# Patient Record
Sex: FEMALE | Race: WHITE | NOT HISPANIC OR LATINO
[De-identification: names, ages, dates, MRNs, and addresses within clinical notes are randomized per-mention and may not be internally consistent; named-entity substitution may affect disease eponyms.]

---

## 2017-02-21 ENCOUNTER — FORM ENCOUNTER (OUTPATIENT)
Age: 62
End: 2017-02-21

## 2017-02-22 ENCOUNTER — APPOINTMENT (OUTPATIENT)
Dept: CT IMAGING | Facility: IMAGING CENTER | Age: 62
End: 2017-02-22

## 2017-02-22 ENCOUNTER — OUTPATIENT (OUTPATIENT)
Dept: OUTPATIENT SERVICES | Facility: HOSPITAL | Age: 62
LOS: 1 days | End: 2017-02-22
Payer: MEDICARE

## 2017-02-22 DIAGNOSIS — C80.1 MALIGNANT (PRIMARY) NEOPLASM, UNSPECIFIED: Chronic | ICD-10-CM

## 2017-02-22 DIAGNOSIS — Z98.89 OTHER SPECIFIED POSTPROCEDURAL STATES: Chronic | ICD-10-CM

## 2017-02-22 DIAGNOSIS — Z00.8 ENCOUNTER FOR OTHER GENERAL EXAMINATION: ICD-10-CM

## 2017-02-22 PROCEDURE — 82565 ASSAY OF CREATININE: CPT

## 2017-02-22 PROCEDURE — 74177 CT ABD & PELVIS W/CONTRAST: CPT

## 2017-02-22 PROCEDURE — 71260 CT THORAX DX C+: CPT

## 2017-03-07 ENCOUNTER — APPOINTMENT (OUTPATIENT)
Dept: ORTHOPEDIC SURGERY | Facility: CLINIC | Age: 62
End: 2017-03-07

## 2017-03-09 ENCOUNTER — RESULT REVIEW (OUTPATIENT)
Age: 62
End: 2017-03-09

## 2017-03-10 ENCOUNTER — APPOINTMENT (OUTPATIENT)
Dept: ULTRASOUND IMAGING | Facility: IMAGING CENTER | Age: 62
End: 2017-03-10

## 2017-03-10 ENCOUNTER — OUTPATIENT (OUTPATIENT)
Dept: OUTPATIENT SERVICES | Facility: HOSPITAL | Age: 62
LOS: 1 days | End: 2017-03-10
Payer: MEDICARE

## 2017-03-10 DIAGNOSIS — Z98.89 OTHER SPECIFIED POSTPROCEDURAL STATES: Chronic | ICD-10-CM

## 2017-03-10 DIAGNOSIS — Z00.8 ENCOUNTER FOR OTHER GENERAL EXAMINATION: ICD-10-CM

## 2017-03-10 DIAGNOSIS — C80.1 MALIGNANT (PRIMARY) NEOPLASM, UNSPECIFIED: Chronic | ICD-10-CM

## 2017-03-10 PROCEDURE — 88305 TISSUE EXAM BY PATHOLOGIST: CPT

## 2017-03-10 PROCEDURE — 38505 NEEDLE BIOPSY LYMPH NODES: CPT

## 2017-03-10 PROCEDURE — 88172 CYTP DX EVAL FNA 1ST EA SITE: CPT

## 2017-03-10 PROCEDURE — 76942 ECHO GUIDE FOR BIOPSY: CPT

## 2017-03-10 PROCEDURE — 88173 CYTOPATH EVAL FNA REPORT: CPT

## 2017-03-15 LAB — NON-GYNECOLOGICAL CYTOLOGY STUDY: SIGNIFICANT CHANGE UP

## 2017-03-28 ENCOUNTER — FORM ENCOUNTER (OUTPATIENT)
Age: 62
End: 2017-03-28

## 2017-03-29 ENCOUNTER — OUTPATIENT (OUTPATIENT)
Dept: OUTPATIENT SERVICES | Facility: HOSPITAL | Age: 62
LOS: 1 days | End: 2017-03-29
Payer: MEDICARE

## 2017-03-29 ENCOUNTER — APPOINTMENT (OUTPATIENT)
Dept: NEUROSURGERY | Facility: CLINIC | Age: 62
End: 2017-03-29

## 2017-03-29 ENCOUNTER — APPOINTMENT (OUTPATIENT)
Dept: MRI IMAGING | Facility: IMAGING CENTER | Age: 62
End: 2017-03-29

## 2017-03-29 VITALS
SYSTOLIC BLOOD PRESSURE: 92 MMHG | HEIGHT: 66 IN | RESPIRATION RATE: 16 BRPM | DIASTOLIC BLOOD PRESSURE: 48 MMHG | OXYGEN SATURATION: 92 % | HEART RATE: 72 BPM | WEIGHT: 108 LBS | BODY MASS INDEX: 17.36 KG/M2

## 2017-03-29 DIAGNOSIS — C80.1 MALIGNANT (PRIMARY) NEOPLASM, UNSPECIFIED: Chronic | ICD-10-CM

## 2017-03-29 DIAGNOSIS — Z98.89 OTHER SPECIFIED POSTPROCEDURAL STATES: Chronic | ICD-10-CM

## 2017-03-29 DIAGNOSIS — Z98.890 OTHER SPECIFIED POSTPROCEDURAL STATES: ICD-10-CM

## 2017-03-29 DIAGNOSIS — R22.31 LOCALIZED SWELLING, MASS AND LUMP, RIGHT UPPER LIMB: ICD-10-CM

## 2017-03-29 DIAGNOSIS — C79.31 SECONDARY MALIGNANT NEOPLASM OF BRAIN: ICD-10-CM

## 2017-03-29 PROCEDURE — 82565 ASSAY OF CREATININE: CPT

## 2017-03-29 PROCEDURE — 70553 MRI BRAIN STEM W/O & W/DYE: CPT

## 2017-03-29 PROCEDURE — A9585: CPT

## 2017-03-29 RX ORDER — GEFITINIB 250 MG/1
250 TABLET, COATED ORAL DAILY
Refills: 0 | Status: ACTIVE | COMMUNITY
Start: 2017-03-29

## 2017-04-28 ENCOUNTER — APPOINTMENT (OUTPATIENT)
Dept: CT IMAGING | Facility: CLINIC | Age: 62
End: 2017-04-28

## 2017-04-28 ENCOUNTER — OUTPATIENT (OUTPATIENT)
Dept: OUTPATIENT SERVICES | Facility: HOSPITAL | Age: 62
LOS: 1 days | End: 2017-04-28
Payer: MEDICARE

## 2017-04-28 DIAGNOSIS — Z98.89 OTHER SPECIFIED POSTPROCEDURAL STATES: Chronic | ICD-10-CM

## 2017-04-28 DIAGNOSIS — Z00.8 ENCOUNTER FOR OTHER GENERAL EXAMINATION: ICD-10-CM

## 2017-04-28 DIAGNOSIS — C80.1 MALIGNANT (PRIMARY) NEOPLASM, UNSPECIFIED: Chronic | ICD-10-CM

## 2017-04-28 PROCEDURE — 71250 CT THORAX DX C-: CPT

## 2017-05-11 ENCOUNTER — INPATIENT (INPATIENT)
Facility: HOSPITAL | Age: 62
LOS: 2 days | Discharge: HOME | DRG: 542 | End: 2017-05-14
Attending: GENERAL ACUTE CARE HOSPITAL | Admitting: GENERAL ACUTE CARE HOSPITAL
Payer: MEDICARE

## 2017-05-11 VITALS
SYSTOLIC BLOOD PRESSURE: 143 MMHG | RESPIRATION RATE: 16 BRPM | HEART RATE: 73 BPM | TEMPERATURE: 99 F | DIASTOLIC BLOOD PRESSURE: 84 MMHG | OXYGEN SATURATION: 98 %

## 2017-05-11 DIAGNOSIS — Z98.89 OTHER SPECIFIED POSTPROCEDURAL STATES: Chronic | ICD-10-CM

## 2017-05-11 DIAGNOSIS — R47.81 SLURRED SPEECH: ICD-10-CM

## 2017-05-11 DIAGNOSIS — C80.1 MALIGNANT (PRIMARY) NEOPLASM, UNSPECIFIED: Chronic | ICD-10-CM

## 2017-05-11 LAB
ALBUMIN SERPL ELPH-MCNC: 4.4 G/DL — SIGNIFICANT CHANGE UP (ref 3.3–5)
ALP SERPL-CCNC: 105 U/L — SIGNIFICANT CHANGE UP (ref 40–120)
ALT FLD-CCNC: 70 U/L RC — HIGH (ref 10–45)
APPEARANCE UR: CLEAR — SIGNIFICANT CHANGE UP
AST SERPL-CCNC: 148 U/L — HIGH (ref 10–40)
BILIRUB SERPL-MCNC: 0.3 MG/DL — SIGNIFICANT CHANGE UP (ref 0.2–1.2)
BILIRUB UR-MCNC: NEGATIVE — SIGNIFICANT CHANGE UP
BUN SERPL-MCNC: 7 MG/DL — SIGNIFICANT CHANGE UP (ref 7–23)
CALCIUM SERPL-MCNC: 9.6 MG/DL — SIGNIFICANT CHANGE UP (ref 8.4–10.5)
CHLORIDE SERPL-SCNC: 96 MMOL/L — SIGNIFICANT CHANGE UP (ref 96–108)
CO2 SERPL-SCNC: 25 MMOL/L — SIGNIFICANT CHANGE UP (ref 22–31)
COLOR SPEC: SIGNIFICANT CHANGE UP
CREAT SERPL-MCNC: 0.88 MG/DL — SIGNIFICANT CHANGE UP (ref 0.5–1.3)
DIFF PNL FLD: NEGATIVE — SIGNIFICANT CHANGE UP
GLUCOSE SERPL-MCNC: 163 MG/DL — HIGH (ref 70–99)
GLUCOSE UR QL: NEGATIVE — SIGNIFICANT CHANGE UP
HCT VFR BLD CALC: 32.8 % — LOW (ref 34.5–45)
HGB BLD-MCNC: 11.6 G/DL — SIGNIFICANT CHANGE UP (ref 11.5–15.5)
KETONES UR-MCNC: NEGATIVE — SIGNIFICANT CHANGE UP
LEUKOCYTE ESTERASE UR-ACNC: NEGATIVE — SIGNIFICANT CHANGE UP
MCHC RBC-ENTMCNC: 35.2 GM/DL — SIGNIFICANT CHANGE UP (ref 32–36)
MCHC RBC-ENTMCNC: 35.8 PG — HIGH (ref 27–34)
MCV RBC AUTO: 102 FL — HIGH (ref 80–100)
NITRITE UR-MCNC: NEGATIVE — SIGNIFICANT CHANGE UP
PH UR: 7 — SIGNIFICANT CHANGE UP (ref 5–8)
PLATELET # BLD AUTO: 437 K/UL — HIGH (ref 150–400)
POTASSIUM SERPL-MCNC: 4 MMOL/L — SIGNIFICANT CHANGE UP (ref 3.5–5.3)
POTASSIUM SERPL-SCNC: 4 MMOL/L — SIGNIFICANT CHANGE UP (ref 3.5–5.3)
PROT SERPL-MCNC: 7.9 G/DL — SIGNIFICANT CHANGE UP (ref 6–8.3)
PROT UR-MCNC: NEGATIVE — SIGNIFICANT CHANGE UP
RBC # BLD: 3.23 M/UL — LOW (ref 3.8–5.2)
RBC # FLD: 15.5 % — HIGH (ref 10.3–14.5)
SODIUM SERPL-SCNC: 137 MMOL/L — SIGNIFICANT CHANGE UP (ref 135–145)
SP GR SPEC: 1.01 — LOW (ref 1.01–1.02)
UROBILINOGEN FLD QL: NEGATIVE — SIGNIFICANT CHANGE UP
WBC # BLD: 5.4 K/UL — SIGNIFICANT CHANGE UP (ref 3.8–10.5)
WBC # FLD AUTO: 5.4 K/UL — SIGNIFICANT CHANGE UP (ref 3.8–10.5)

## 2017-05-11 PROCEDURE — 70450 CT HEAD/BRAIN W/O DYE: CPT | Mod: 26

## 2017-05-11 PROCEDURE — 73090 X-RAY EXAM OF FOREARM: CPT | Mod: 26,LT

## 2017-05-11 PROCEDURE — 93010 ELECTROCARDIOGRAM REPORT: CPT

## 2017-05-11 PROCEDURE — 71010: CPT | Mod: 26

## 2017-05-11 PROCEDURE — 99285 EMERGENCY DEPT VISIT HI MDM: CPT | Mod: 25,GC

## 2017-05-11 RX ORDER — SODIUM CHLORIDE 9 MG/ML
1000 INJECTION INTRAMUSCULAR; INTRAVENOUS; SUBCUTANEOUS
Qty: 0 | Refills: 0 | Status: DISCONTINUED | OUTPATIENT
Start: 2017-05-11 | End: 2017-05-14

## 2017-05-11 RX ORDER — DEXAMETHASONE 0.5 MG/5ML
10 ELIXIR ORAL ONCE
Qty: 0 | Refills: 0 | Status: DISCONTINUED | OUTPATIENT
Start: 2017-05-11 | End: 2017-05-11

## 2017-05-11 RX ORDER — LEVETIRACETAM 250 MG/1
1000 TABLET, FILM COATED ORAL ONCE
Qty: 0 | Refills: 0 | Status: COMPLETED | OUTPATIENT
Start: 2017-05-11 | End: 2017-05-12

## 2017-05-11 RX ORDER — DEXAMETHASONE 0.5 MG/5ML
10 ELIXIR ORAL ONCE
Qty: 0 | Refills: 0 | Status: COMPLETED | OUTPATIENT
Start: 2017-05-11 | End: 2017-05-12

## 2017-05-11 RX ADMIN — SODIUM CHLORIDE 100 MILLILITER(S): 9 INJECTION INTRAMUSCULAR; INTRAVENOUS; SUBCUTANEOUS at 20:54

## 2017-05-11 NOTE — ED ADULT NURSE NOTE - PSH
Cancer  Tjfwu-u-nkud insertion  H/O brain surgery  7/9/15 - removal of brain tumor--post op received gamma knife followed by RT  History of lung biopsy  left   2014 EBUS  positive lung cancer  History of lung surgery  left wedge 1/15 --- diagnosed with mets to brain and right wrist

## 2017-05-11 NOTE — ED ADULT NURSE NOTE - OBJECTIVE STATEMENT
61 y/o female presented to the ED c/o facial swelling, slurred speech x 4 days. Pt denies difficulty swallowing, or breathing.   Pt was at chemo and was concerned about facial swelling. Pt reports swelling began 4 days ago. Pt also reports slightly slurred speech, and sent in for eval. Pt has hx of brain surgery for a tumor. Pt arrived directly from chemotherapy today, [facility] already accessed and dated 5/11/2017. Pt is A&Ox3, PERRL, lungs CTA, abd. soft, NT, ND, skin W/D/I, MAEx4. 63 y/o female presented to the ED c/o facial swelling, slurred speech x 4 days. Pt denies difficulty swallowing, or breathing.   Pt was at chemo and was concerned about facial swelling. Pt reports swelling began 4 days ago. Pt also reports slightly slurred speech, and sent in for eval. Pt has hx of metastatic lung cancer w/ METS to brain. Pt arrived directly from chemotherapy today, [facility] already accessed and dated 5/11/2017. Pt is A&Ox3, PERRL, lungs CTA, abd. soft, NT, ND, skin W/D/I, MAEx4.

## 2017-05-11 NOTE — ED ADULT NURSE NOTE - PMH
Anxiety    Depression    History of seizure  occurred after brain surgery -- last seizure July 2015  Hyperlipidemia    Hypertension    Hyponatremia    Hypothyroidism    Lung cancer  diagnosed in 2014 --  mets to bone, brain s/p craniotomy for brain met resection, lung wedge resection and right forearm met resection. also s/p xrt  Lung cancer, left  biopsy  chemo cisplatin/Alimta 11/2014  radiation 2014  Mass  right wrist mass in November 2016  Seizure  last seizure in 2015 after brain surgery  Snoring  TATIANA precautions -- responds affirmatively to STOP BANG questionnaire -- admits to loud snoring; age > 50; h/o htn

## 2017-05-11 NOTE — ED PROVIDER NOTE - OBJECTIVE STATEMENT
Private Physician Frandy Mendiola (ONC), Kassie Powers (PCP)  62y female pmh lung ca dx 6/2014, SP Surg, lung, to lung, crainotomy, On chemo, Last dose today. Pt comes to ed complains of Slurred speech onset 1 week ago. Had facial swelling last week that responded to benadryl. No fever chills. No change in gait. No ha, mild nausea/vomiting after chemo. Sent to pmd for eval.

## 2017-05-11 NOTE — ED PROVIDER NOTE - MEDICAL DECISION MAKING DETAILS
Hx of metastatic Lung ca pw slight slurred speech for past week, Ro met check ct head consult onc/neuro surg check labs.

## 2017-05-11 NOTE — ED PROVIDER NOTE - PSH
Cancer  Yjyyd-n-lnse insertion  H/O brain surgery  7/9/15 - removal of brain tumor--post op received gamma knife followed by RT  History of lung biopsy  left   2014 EBUS  positive lung cancer  History of lung surgery  left wedge 1/15 --- diagnosed with mets to brain and right wrist

## 2017-05-12 DIAGNOSIS — Z29.9 ENCOUNTER FOR PROPHYLACTIC MEASURES, UNSPECIFIED: ICD-10-CM

## 2017-05-12 DIAGNOSIS — F32.9 MAJOR DEPRESSIVE DISORDER, SINGLE EPISODE, UNSPECIFIED: ICD-10-CM

## 2017-05-12 DIAGNOSIS — R47.81 SLURRED SPEECH: ICD-10-CM

## 2017-05-12 DIAGNOSIS — C34.90 MALIGNANT NEOPLASM OF UNSPECIFIED PART OF UNSPECIFIED BRONCHUS OR LUNG: ICD-10-CM

## 2017-05-12 LAB
ALBUMIN SERPL ELPH-MCNC: 4.2 G/DL — SIGNIFICANT CHANGE UP (ref 3.3–5)
ALP SERPL-CCNC: 96 U/L — SIGNIFICANT CHANGE UP (ref 40–120)
ALT FLD-CCNC: 64 U/L — HIGH (ref 10–45)
ANION GAP SERPL CALC-SCNC: 16 MMOL/L — SIGNIFICANT CHANGE UP (ref 5–17)
AST SERPL-CCNC: 132 U/L — HIGH (ref 10–40)
BASOPHILS # BLD AUTO: 0.01 K/UL — SIGNIFICANT CHANGE UP (ref 0–0.2)
BASOPHILS NFR BLD AUTO: 0.2 % — SIGNIFICANT CHANGE UP (ref 0–2)
BILIRUB SERPL-MCNC: 0.3 MG/DL — SIGNIFICANT CHANGE UP (ref 0.2–1.2)
BUN SERPL-MCNC: 8 MG/DL — SIGNIFICANT CHANGE UP (ref 7–23)
CALCIUM SERPL-MCNC: 9 MG/DL — SIGNIFICANT CHANGE UP (ref 8.4–10.5)
CHLORIDE SERPL-SCNC: 94 MMOL/L — LOW (ref 96–108)
CO2 SERPL-SCNC: 23 MMOL/L — SIGNIFICANT CHANGE UP (ref 22–31)
CREAT SERPL-MCNC: 0.8 MG/DL — SIGNIFICANT CHANGE UP (ref 0.5–1.3)
EOSINOPHIL # BLD AUTO: 0 K/UL — SIGNIFICANT CHANGE UP (ref 0–0.5)
EOSINOPHIL NFR BLD AUTO: 0 % — SIGNIFICANT CHANGE UP (ref 0–6)
GLUCOSE SERPL-MCNC: 146 MG/DL — HIGH (ref 70–99)
HCT VFR BLD CALC: 31 % — LOW (ref 34.5–45)
HGB BLD-MCNC: 10.8 G/DL — LOW (ref 11.5–15.5)
IMM GRANULOCYTES NFR BLD AUTO: 0.2 % — SIGNIFICANT CHANGE UP (ref 0–1.5)
LYMPHOCYTES # BLD AUTO: 0.54 K/UL — LOW (ref 1–3.3)
LYMPHOCYTES # BLD AUTO: 10.3 % — LOW (ref 13–44)
MAGNESIUM SERPL-MCNC: 2 MG/DL — SIGNIFICANT CHANGE UP (ref 1.6–2.6)
MCHC RBC-ENTMCNC: 34.2 PG — HIGH (ref 27–34)
MCHC RBC-ENTMCNC: 34.8 GM/DL — SIGNIFICANT CHANGE UP (ref 32–36)
MCV RBC AUTO: 98.1 FL — SIGNIFICANT CHANGE UP (ref 80–100)
MONOCYTES # BLD AUTO: 0.03 K/UL — SIGNIFICANT CHANGE UP (ref 0–0.9)
MONOCYTES NFR BLD AUTO: 0.6 % — LOW (ref 2–14)
NEUTROPHILS # BLD AUTO: 4.63 K/UL — SIGNIFICANT CHANGE UP (ref 1.8–7.4)
NEUTROPHILS NFR BLD AUTO: 88.7 % — HIGH (ref 43–77)
PHOSPHATE SERPL-MCNC: 2.5 MG/DL — SIGNIFICANT CHANGE UP (ref 2.5–4.5)
PLATELET # BLD AUTO: 418 K/UL — HIGH (ref 150–400)
POTASSIUM SERPL-MCNC: 4.1 MMOL/L — SIGNIFICANT CHANGE UP (ref 3.5–5.3)
POTASSIUM SERPL-SCNC: 4.1 MMOL/L — SIGNIFICANT CHANGE UP (ref 3.5–5.3)
PROT SERPL-MCNC: 7.5 G/DL — SIGNIFICANT CHANGE UP (ref 6–8.3)
RBC # BLD: 3.16 M/UL — LOW (ref 3.8–5.2)
RBC # FLD: 17.5 % — HIGH (ref 10.3–14.5)
SODIUM SERPL-SCNC: 133 MMOL/L — LOW (ref 135–145)
T4 AB SER-ACNC: <0.4 UG/DL — SIGNIFICANT CHANGE UP (ref 4.6–12)
TSH SERPL-MCNC: 98.77 UIU/ML — HIGH (ref 0.27–4.2)
WBC # BLD: 5.22 K/UL — SIGNIFICANT CHANGE UP (ref 3.8–10.5)
WBC # FLD AUTO: 5.22 K/UL — SIGNIFICANT CHANGE UP (ref 3.8–10.5)

## 2017-05-12 PROCEDURE — 76700 US EXAM ABDOM COMPLETE: CPT | Mod: 26

## 2017-05-12 PROCEDURE — 99223 1ST HOSP IP/OBS HIGH 75: CPT

## 2017-05-12 PROCEDURE — 99497 ADVNCD CARE PLAN 30 MIN: CPT | Mod: 25

## 2017-05-12 PROCEDURE — 70540 MRI ORBIT/FACE/NECK W/O DYE: CPT | Mod: 26

## 2017-05-12 PROCEDURE — 70553 MRI BRAIN STEM W/O & W/DYE: CPT | Mod: 26

## 2017-05-12 RX ORDER — PROCHLORPERAZINE MALEATE 5 MG
10 TABLET ORAL EVERY 12 HOURS
Qty: 0 | Refills: 0 | Status: DISCONTINUED | OUTPATIENT
Start: 2017-05-12 | End: 2017-05-14

## 2017-05-12 RX ORDER — LEVETIRACETAM 250 MG/1
500 TABLET, FILM COATED ORAL
Qty: 0 | Refills: 0 | Status: DISCONTINUED | OUTPATIENT
Start: 2017-05-12 | End: 2017-05-14

## 2017-05-12 RX ORDER — PANTOPRAZOLE SODIUM 20 MG/1
40 TABLET, DELAYED RELEASE ORAL
Qty: 0 | Refills: 0 | Status: DISCONTINUED | OUTPATIENT
Start: 2017-05-12 | End: 2017-05-14

## 2017-05-12 RX ORDER — DEXAMETHASONE 0.5 MG/5ML
4 ELIXIR ORAL EVERY 6 HOURS
Qty: 0 | Refills: 0 | Status: DISCONTINUED | OUTPATIENT
Start: 2017-05-12 | End: 2017-05-14

## 2017-05-12 RX ORDER — ESCITALOPRAM OXALATE 10 MG/1
10 TABLET, FILM COATED ORAL DAILY
Qty: 0 | Refills: 0 | Status: DISCONTINUED | OUTPATIENT
Start: 2017-05-12 | End: 2017-05-14

## 2017-05-12 RX ADMIN — PANTOPRAZOLE SODIUM 40 MILLIGRAM(S): 20 TABLET, DELAYED RELEASE ORAL at 06:46

## 2017-05-12 RX ADMIN — SODIUM CHLORIDE 100 MILLILITER(S): 9 INJECTION INTRAMUSCULAR; INTRAVENOUS; SUBCUTANEOUS at 21:54

## 2017-05-12 RX ADMIN — ESCITALOPRAM OXALATE 10 MILLIGRAM(S): 10 TABLET, FILM COATED ORAL at 13:04

## 2017-05-12 RX ADMIN — Medication 102 MILLIGRAM(S): at 01:29

## 2017-05-12 RX ADMIN — LEVETIRACETAM 500 MILLIGRAM(S): 250 TABLET, FILM COATED ORAL at 21:53

## 2017-05-12 RX ADMIN — Medication 4 MILLIGRAM(S): at 17:19

## 2017-05-12 RX ADMIN — LEVETIRACETAM 400 MILLIGRAM(S): 250 TABLET, FILM COATED ORAL at 02:23

## 2017-05-12 RX ADMIN — LEVETIRACETAM 500 MILLIGRAM(S): 250 TABLET, FILM COATED ORAL at 10:21

## 2017-05-12 RX ADMIN — Medication 4 MILLIGRAM(S): at 06:46

## 2017-05-12 RX ADMIN — Medication 4 MILLIGRAM(S): at 23:44

## 2017-05-12 RX ADMIN — Medication 4 MILLIGRAM(S): at 13:05

## 2017-05-12 NOTE — H&P ADULT. - RADIOLOGY RESULTS AND INTERPRETATION
CXR reviewed: no focal consolidation  CT head reviewed: s/p left parietal craniotomy; there is low density in the parietal surgical bed appears to be slightly more prominent on prior exam; May represent residual neoplasm and edema  XR right forearm reviewed: s/p fixation plate and screws; no pathological fractures.

## 2017-05-12 NOTE — H&P ADULT. - FAMILY HISTORY
<<-----Click on this checkbox to enter Family History Family history of heart disease, Mom     Father  Still living? Unknown  Family history of bone cancer, Age at diagnosis: Age Unknown

## 2017-05-12 NOTE — H&P ADULT. - LAB RESULTS AND INTERPRETATION
labs personally reviewed and interpreted:  no leukocytosis, Hb 11.6, platelet 437  electrolytes normal  , ALT 70, alk phos 105  urinalysis negative

## 2017-05-12 NOTE — H&P ADULT. - PROBLEM SELECTOR PLAN 2
lung cancer mets to brain and axillary lymph node; ?new submandibular/submental lymph node  currently on chemo therapy  oncology f/u in AM  will get MRI neck for lymphadenopathy  Also has swelling of right wrist, ?new lesion  XR wrist was negative for pathological fracture

## 2017-05-12 NOTE — H&P ADULT. - PROBLEM SELECTOR PLAN 3
? metastasis to liver  will check US liver for further evaluation  viral hepatitis panel was negative in July 2016  monitor LFTs

## 2017-05-12 NOTE — H&P ADULT. - PROBLEM SELECTOR PLAN 5
no AC at this time due to brain lesion; no AC at this time due to brain lesion;  protonix I had 20 minute discussion with patient regarding goals of care.  Patient verbalized understanding.   is the health care proxy.  At this point patient remains full code.

## 2017-05-12 NOTE — H&P ADULT. - PSH
Cancer  Xhabt-k-tdmy insertion  H/O brain surgery  7/9/15 - removal of brain tumor--post op received gamma knife followed by RT  History of lung biopsy  left   2014 EBUS  positive lung cancer  History of lung surgery  left wedge 1/15 --- diagnosed with mets to brain and right wrist

## 2017-05-12 NOTE — H&P ADULT. - OTHER
Old records reviewed:  CT chest w contrast and CT abd/pelv February 2017: increased in size of right axillary and paraesophageal lymph nodes.   MRI head 3/29/17: stable enhancement in left parietal craniotomy site with stable small associated encephalocele  viral hepatitis panel negative July 2016  EKG July 2015: NSR  CXR July 2016: clear

## 2017-05-12 NOTE — H&P ADULT. - HISTORY OF PRESENT ILLNESS
61 yo female with PMH of depression, anxiety, hypothyroidism, seizures, lung cancer with metastasis to brain and wrist, s/p lung surgery, craniotomy, presents here with slurred speech.  As per , patient has been having slurring of the speech intermittently for last one week.  She sounds like she has trouble moving her tongue.  About 1.5 week ago, she also had swelling of face and eyes for which she took benadryl.  Swelling seems to be improving but still persistent under the chin.  She denies any pain, SOB, cough, fever.  She does have generalized weakness, but denies any trouble walking.  She denies any numbness or tingling.  She was at [place] for last few days and returned yesterday.  Patient went to her oncologist today for chemotherapy and her oncologist noticed that patient was having some difficulty with speaking and therefore referred patient here for further evaluation.  In ED, patient had a CT brain done which showed a low density lesion suspicious for residual neoplasm and edema.  Neurosurgery recommended keppra and decadron and MRI of brain. 63 yo female with PMH of depression, anxiety, hypothyroidism, seizures, lung cancer with metastasis to brain and wrist, s/p lung surgery, craniotomy, presents here with slurred speech.  As per , patient has been having slurring of the speech intermittently for last one week.  She sounds like she has trouble moving her tongue.  About 2 weeks ago patient was diagnosed with pneumonia and was treated with amoxicillin.  After she completed a 7 day course, about 1 week ago, she also had swelling of face and eyes for which she took benadryl.  Swelling seems to be improving but still persistent under the chin.  She denies any pain, SOB, cough, fever.  She does have generalized weakness, but denies any trouble walking.  She denies any numbness or tingling.  She was at [facility] for last few days and returned yesterday.  Patient went to her oncologist today for chemotherapy and her oncologist noticed that patient was having some difficulty with speaking and therefore referred patient here for further evaluation.  In ED, patient had a CT brain done which showed a low density lesion suspicious for residual neoplasm and edema.  Neurosurgery recommended keppra and decadron and MRI of brain.

## 2017-05-12 NOTE — H&P ADULT. - PROBLEM SELECTOR PLAN 1
Patient with intermittent slurred speech, unclear if related to brain lesions  CT brain showed possible residual neoplasm and/or edema  loaded with keppra 1000mg IV, s/p decadron 10mg IV  neurosurgery saw patient recommends getting MRI brain, which is ordered  consider neurology consult in AM  c/w neuro checks q4h Patient with intermittent slurred speech, unclear if related to brain lesions  CT brain showed possible residual neoplasm and/or edema  loaded with keppra 1000mg IV, s/p decadron 10mg IV one dose; will continue with decadron 4mg q6h and keppra 500mg q12 for now  neurosurgery saw patient recommends getting MRI brain, which is ordered  consider neurology consult in AM to also address keppra dose  c/w neuro checks q4h

## 2017-05-13 LAB
ANION GAP SERPL CALC-SCNC: 16 MMOL/L — SIGNIFICANT CHANGE UP (ref 5–17)
BUN SERPL-MCNC: 8 MG/DL — SIGNIFICANT CHANGE UP (ref 7–23)
CALCIUM SERPL-MCNC: 9.3 MG/DL — SIGNIFICANT CHANGE UP (ref 8.4–10.5)
CHLORIDE SERPL-SCNC: 96 MMOL/L — SIGNIFICANT CHANGE UP (ref 96–108)
CO2 SERPL-SCNC: 22 MMOL/L — SIGNIFICANT CHANGE UP (ref 22–31)
CREAT SERPL-MCNC: 0.73 MG/DL — SIGNIFICANT CHANGE UP (ref 0.5–1.3)
GLUCOSE SERPL-MCNC: 107 MG/DL — HIGH (ref 70–99)
HCT VFR BLD CALC: 30.6 % — LOW (ref 34.5–45)
HGB BLD-MCNC: 10.2 G/DL — LOW (ref 11.5–15.5)
MCHC RBC-ENTMCNC: 33.3 GM/DL — SIGNIFICANT CHANGE UP (ref 32–36)
MCHC RBC-ENTMCNC: 33.3 PG — SIGNIFICANT CHANGE UP (ref 27–34)
MCV RBC AUTO: 100 FL — SIGNIFICANT CHANGE UP (ref 80–100)
PLATELET # BLD AUTO: 410 K/UL — HIGH (ref 150–400)
POTASSIUM SERPL-MCNC: 4 MMOL/L — SIGNIFICANT CHANGE UP (ref 3.5–5.3)
POTASSIUM SERPL-SCNC: 4 MMOL/L — SIGNIFICANT CHANGE UP (ref 3.5–5.3)
RBC # BLD: 3.06 M/UL — LOW (ref 3.8–5.2)
RBC # FLD: 17.9 % — HIGH (ref 10.3–14.5)
SODIUM SERPL-SCNC: 134 MMOL/L — LOW (ref 135–145)
WBC # BLD: 9.19 K/UL — SIGNIFICANT CHANGE UP (ref 3.8–10.5)
WBC # FLD AUTO: 9.19 K/UL — SIGNIFICANT CHANGE UP (ref 3.8–10.5)

## 2017-05-13 PROCEDURE — 99232 SBSQ HOSP IP/OBS MODERATE 35: CPT

## 2017-05-13 RX ORDER — ASPIRIN/CALCIUM CARB/MAGNESIUM 324 MG
81 TABLET ORAL DAILY
Qty: 0 | Refills: 0 | Status: DISCONTINUED | OUTPATIENT
Start: 2017-05-13 | End: 2017-05-14

## 2017-05-13 RX ORDER — DOCUSATE SODIUM 100 MG
100 CAPSULE ORAL
Qty: 0 | Refills: 0 | Status: DISCONTINUED | OUTPATIENT
Start: 2017-05-13 | End: 2017-05-14

## 2017-05-13 RX ADMIN — LEVETIRACETAM 500 MILLIGRAM(S): 250 TABLET, FILM COATED ORAL at 17:04

## 2017-05-13 RX ADMIN — ESCITALOPRAM OXALATE 10 MILLIGRAM(S): 10 TABLET, FILM COATED ORAL at 11:30

## 2017-05-13 RX ADMIN — SODIUM CHLORIDE 100 MILLILITER(S): 9 INJECTION INTRAMUSCULAR; INTRAVENOUS; SUBCUTANEOUS at 15:30

## 2017-05-13 RX ADMIN — Medication 4 MILLIGRAM(S): at 05:12

## 2017-05-13 RX ADMIN — PANTOPRAZOLE SODIUM 40 MILLIGRAM(S): 20 TABLET, DELAYED RELEASE ORAL at 05:12

## 2017-05-13 RX ADMIN — LEVETIRACETAM 500 MILLIGRAM(S): 250 TABLET, FILM COATED ORAL at 05:12

## 2017-05-13 RX ADMIN — Medication 4 MILLIGRAM(S): at 11:30

## 2017-05-13 RX ADMIN — Medication 4 MILLIGRAM(S): at 17:04

## 2017-05-14 ENCOUNTER — TRANSCRIPTION ENCOUNTER (OUTPATIENT)
Age: 62
End: 2017-05-14

## 2017-05-14 VITALS
OXYGEN SATURATION: 99 % | RESPIRATION RATE: 18 BRPM | HEART RATE: 70 BPM | SYSTOLIC BLOOD PRESSURE: 156 MMHG | TEMPERATURE: 98 F | DIASTOLIC BLOOD PRESSURE: 89 MMHG

## 2017-05-14 LAB
ANION GAP SERPL CALC-SCNC: 17 MMOL/L — SIGNIFICANT CHANGE UP (ref 5–17)
BUN SERPL-MCNC: 11 MG/DL — SIGNIFICANT CHANGE UP (ref 7–23)
CALCIUM SERPL-MCNC: 9.7 MG/DL — SIGNIFICANT CHANGE UP (ref 8.4–10.5)
CHLORIDE SERPL-SCNC: 95 MMOL/L — LOW (ref 96–108)
CO2 SERPL-SCNC: 23 MMOL/L — SIGNIFICANT CHANGE UP (ref 22–31)
CREAT SERPL-MCNC: 0.74 MG/DL — SIGNIFICANT CHANGE UP (ref 0.5–1.3)
GLUCOSE SERPL-MCNC: 97 MG/DL — SIGNIFICANT CHANGE UP (ref 70–99)
HCT VFR BLD CALC: 33.5 % — LOW (ref 34.5–45)
HGB BLD-MCNC: 11.1 G/DL — LOW (ref 11.5–15.5)
MCHC RBC-ENTMCNC: 33.1 GM/DL — SIGNIFICANT CHANGE UP (ref 32–36)
MCHC RBC-ENTMCNC: 33.1 PG — SIGNIFICANT CHANGE UP (ref 27–34)
MCV RBC AUTO: 100 FL — SIGNIFICANT CHANGE UP (ref 80–100)
PLATELET # BLD AUTO: 407 K/UL — HIGH (ref 150–400)
POTASSIUM SERPL-MCNC: 4 MMOL/L — SIGNIFICANT CHANGE UP (ref 3.5–5.3)
POTASSIUM SERPL-SCNC: 4 MMOL/L — SIGNIFICANT CHANGE UP (ref 3.5–5.3)
RBC # BLD: 3.35 M/UL — LOW (ref 3.8–5.2)
RBC # FLD: 18.1 % — HIGH (ref 10.3–14.5)
SODIUM SERPL-SCNC: 135 MMOL/L — SIGNIFICANT CHANGE UP (ref 135–145)
WBC # BLD: 7.65 K/UL — SIGNIFICANT CHANGE UP (ref 3.8–10.5)
WBC # FLD AUTO: 7.65 K/UL — SIGNIFICANT CHANGE UP (ref 3.8–10.5)

## 2017-05-14 PROCEDURE — 99232 SBSQ HOSP IP/OBS MODERATE 35: CPT

## 2017-05-14 PROCEDURE — 99285 EMERGENCY DEPT VISIT HI MDM: CPT | Mod: 25

## 2017-05-14 PROCEDURE — 85027 COMPLETE CBC AUTOMATED: CPT

## 2017-05-14 PROCEDURE — 93005 ELECTROCARDIOGRAM TRACING: CPT

## 2017-05-14 PROCEDURE — 70540 MRI ORBIT/FACE/NECK W/O DYE: CPT

## 2017-05-14 PROCEDURE — A9585: CPT

## 2017-05-14 PROCEDURE — 80053 COMPREHEN METABOLIC PANEL: CPT

## 2017-05-14 PROCEDURE — 76700 US EXAM ABDOM COMPLETE: CPT

## 2017-05-14 PROCEDURE — 73090 X-RAY EXAM OF FOREARM: CPT

## 2017-05-14 PROCEDURE — 86255 FLUORESCENT ANTIBODY SCREEN: CPT

## 2017-05-14 PROCEDURE — 86341 ISLET CELL ANTIBODY: CPT

## 2017-05-14 PROCEDURE — 70553 MRI BRAIN STEM W/O & W/DYE: CPT

## 2017-05-14 PROCEDURE — 83735 ASSAY OF MAGNESIUM: CPT

## 2017-05-14 PROCEDURE — 84443 ASSAY THYROID STIM HORMONE: CPT

## 2017-05-14 PROCEDURE — 81003 URINALYSIS AUTO W/O SCOPE: CPT

## 2017-05-14 PROCEDURE — 71045 X-RAY EXAM CHEST 1 VIEW: CPT

## 2017-05-14 PROCEDURE — 70450 CT HEAD/BRAIN W/O DYE: CPT

## 2017-05-14 PROCEDURE — 84100 ASSAY OF PHOSPHORUS: CPT

## 2017-05-14 PROCEDURE — 84436 ASSAY OF TOTAL THYROXINE: CPT

## 2017-05-14 PROCEDURE — 80048 BASIC METABOLIC PNL TOTAL CA: CPT

## 2017-05-14 RX ORDER — DOCUSATE SODIUM 100 MG
1 CAPSULE ORAL
Qty: 0 | Refills: 0 | COMMUNITY
Start: 2017-05-14

## 2017-05-14 RX ORDER — PANTOPRAZOLE SODIUM 20 MG/1
1 TABLET, DELAYED RELEASE ORAL
Qty: 30 | Refills: 0 | OUTPATIENT
Start: 2017-05-14 | End: 2017-06-13

## 2017-05-14 RX ORDER — ASPIRIN/CALCIUM CARB/MAGNESIUM 324 MG
1 TABLET ORAL
Qty: 0 | Refills: 0 | COMMUNITY
Start: 2017-05-14

## 2017-05-14 RX ORDER — LEVETIRACETAM 250 MG/1
1 TABLET, FILM COATED ORAL
Qty: 60 | Refills: 0 | OUTPATIENT
Start: 2017-05-14 | End: 2017-06-13

## 2017-05-14 RX ORDER — DEXAMETHASONE 0.5 MG/5ML
1 ELIXIR ORAL
Qty: 10 | Refills: 0 | OUTPATIENT
Start: 2017-05-14 | End: 2017-05-18

## 2017-05-14 RX ADMIN — Medication 100 MILLIGRAM(S): at 05:16

## 2017-05-14 RX ADMIN — ESCITALOPRAM OXALATE 10 MILLIGRAM(S): 10 TABLET, FILM COATED ORAL at 11:46

## 2017-05-14 RX ADMIN — SODIUM CHLORIDE 100 MILLILITER(S): 9 INJECTION INTRAMUSCULAR; INTRAVENOUS; SUBCUTANEOUS at 00:18

## 2017-05-14 RX ADMIN — Medication 100 MILLIGRAM(S): at 00:11

## 2017-05-14 RX ADMIN — Medication 81 MILLIGRAM(S): at 11:45

## 2017-05-14 RX ADMIN — Medication 4 MILLIGRAM(S): at 05:15

## 2017-05-14 RX ADMIN — LEVETIRACETAM 500 MILLIGRAM(S): 250 TABLET, FILM COATED ORAL at 05:15

## 2017-05-14 RX ADMIN — PANTOPRAZOLE SODIUM 40 MILLIGRAM(S): 20 TABLET, DELAYED RELEASE ORAL at 05:15

## 2017-05-14 RX ADMIN — Medication 4 MILLIGRAM(S): at 11:44

## 2017-05-14 RX ADMIN — Medication 4 MILLIGRAM(S): at 00:19

## 2017-05-14 NOTE — DISCHARGE NOTE ADULT - CARE PROVIDER_API CALL
Live Vidal), Neurology  [address]  [place] [address]  Phone: [phone]  Fax: [phone]    Frandy Mendiola), Hematology; Medical Oncology  [address]  [place] [address]  Phone: [phone]  Fax: [phone]

## 2017-05-14 NOTE — DISCHARGE NOTE ADULT - CARE PLAN
Principal Discharge DX:	Slurred speech  Goal:	now resolved  Instructions for follow-up, activity and diet:	symptoms likely related to mets to the brain and edema, continue to take medications as prescribed   follow up with Neurology in 1 -2 weeks   Repeat MRI in 1 month  Secondary Diagnosis:	Malignant neoplasm of lung, unspecified laterality, unspecified part of lung  Instructions for follow-up, activity and diet:	f/u with Oncology  Secondary Diagnosis:	Seizure  Instructions for follow-up, activity and diet:	continue with medications  follow up with neurology  Secondary Diagnosis:	Depression, unspecified depression type  Instructions for follow-up, activity and diet:	SEEK IMMEDIATE CARE IF  You have thoughts about hurting yourself or others.  You lose touch with reality (have psychotic symptoms).  You are taking medicine for depression and have a serious side effect

## 2017-05-14 NOTE — DISCHARGE NOTE ADULT - MEDICATION SUMMARY - MEDICATIONS TO TAKE
I will START or STAY ON the medications listed below when I get home from the hospital:    Boost supplement one can every afternoon  --     -- Indication: For Supplement     dexamethasone 4 mg oral tablet  -- 1 tab(s) po every 8 hours x 2 days, then 1 tab po  every 12 hrs for 2 days   -- Indication: For Brain edema     Tylenol 500 mg oral tablet  -- 2 tab(s) by mouth , As Needed  -- Indication: For Pain    aspirin 81 mg oral delayed release tablet  -- 1 tab(s) by mouth once a day  -- Indication: For Prophylactic measure    levETIRAcetam 500 mg oral tablet  -- 1 tab(s) by mouth 2 times a day  -- Indication: For antiseizure     Lexapro 10 mg oral tablet  -- 1 tab(s) by mouth once a day (at bedtime)  -- Indication: For Depression    Compazine  -- 10 milligram(s) by mouth every 12 hours, As Needed  -- Indication: For nausea     docusate sodium 100 mg oral capsule  -- 1 cap(s) by mouth 2 times a day  -- Indication: For constipation    pantoprazole 40 mg oral delayed release tablet  -- 1 tab(s) by mouth once a day (before a meal)  -- Indication: For reflux

## 2017-05-14 NOTE — DISCHARGE NOTE ADULT - PLAN OF CARE
now resolved symptoms likely related to mets to the brain and edema, continue to take medications as prescribed   follow up with Neurology in 1 -2 weeks   Repeat MRI in 1 month f/u with Oncology continue with medications  follow up with neurology SEEK IMMEDIATE CARE IF  You have thoughts about hurting yourself or others.  You lose touch with reality (have psychotic symptoms).  You are taking medicine for depression and have a serious side effect

## 2017-05-14 NOTE — DISCHARGE NOTE ADULT - MEDICATION SUMMARY - MEDICATIONS TO CHANGE
I will SWITCH the dose or number of times a day I take the medications listed below when I get home from the hospital:    Keppra 250 mg oral tablet  -- 1 tab(s) by mouth 2 times a day

## 2017-05-14 NOTE — DISCHARGE NOTE ADULT - HOSPITAL COURSE
to be completed by attending 61 yo female with PMH of depression, anxiety, hypothyroidism, seizures, lung cancer with metastasis to brain and wrist, s/p lung surgery, craniotomy, presents here with slurred speech.  Pt admitted to medicine. was followed by neuro/ Nsx , onco and cardio.  Sx resolved... MRI : No new intracranial signal or enhancement abnormality.  sx were thought to possibly be sec to chemo though not highly likely. another possiblity was leptomeningeal disease however pt already on checmo.   pt was hemodynamically stable and was cleared for DC by neuro.  decardon taper given for possible small elemnet of inflmatory changes that were not seen on MRI .

## 2017-05-14 NOTE — DISCHARGE NOTE ADULT - MEDICATION SUMMARY - MEDICATIONS TO STOP TAKING
I will STOP taking the medications listed below when I get home from the hospital:    Mobic 7.5 mg oral tablet  -- 1 tab(s) by mouth , As Needed--last dose last week    acetaminophen-oxyCODONE 325 mg-5 mg oral tablet  -- 1 tab(s) by mouth every 4 hours MDD:6 prn pain  -- Caution federal law prohibits the transfer of this drug to any person other  than the person for whom it was prescribed.  May cause drowsiness.  Alcohol may intensify this effect.  Use care when operating dangerous machinery.  This prescription cannot be refilled.  This product contains acetaminophen.  Do not use  with any other product containing acetaminophen to prevent possible liver damage.  Using more of this medication than prescribed may cause serious breathing problems.

## 2017-05-14 NOTE — DISCHARGE NOTE ADULT - PATIENT PORTAL LINK FT
“You can access the FollowHealth Patient Portal, offered by [facility], by registering with the following website: [email or web address]”

## 2017-05-14 NOTE — DISCHARGE NOTE ADULT - SECONDARY DIAGNOSIS.
Malignant neoplasm of lung, unspecified laterality, unspecified part of lung Seizure Depression, unspecified depression type

## 2017-05-16 ENCOUNTER — APPOINTMENT (OUTPATIENT)
Dept: ORTHOPEDIC SURGERY | Facility: CLINIC | Age: 62
End: 2017-05-16

## 2017-05-24 ENCOUNTER — EMERGENCY (EMERGENCY)
Facility: HOSPITAL | Age: 62
LOS: 1 days | Discharge: HOME | End: 2017-05-24
Attending: EMERGENCY MEDICINE | Admitting: EMERGENCY MEDICINE
Payer: MEDICARE

## 2017-05-24 VITALS
HEART RATE: 71 BPM | RESPIRATION RATE: 20 BRPM | OXYGEN SATURATION: 100 % | TEMPERATURE: 98 F | DIASTOLIC BLOOD PRESSURE: 77 MMHG | SYSTOLIC BLOOD PRESSURE: 115 MMHG

## 2017-05-24 DIAGNOSIS — R25.1 TREMOR, UNSPECIFIED: ICD-10-CM

## 2017-05-24 DIAGNOSIS — E78.5 HYPERLIPIDEMIA, UNSPECIFIED: ICD-10-CM

## 2017-05-24 DIAGNOSIS — Z98.89 OTHER SPECIFIED POSTPROCEDURAL STATES: Chronic | ICD-10-CM

## 2017-05-24 DIAGNOSIS — C80.1 MALIGNANT (PRIMARY) NEOPLASM, UNSPECIFIED: Chronic | ICD-10-CM

## 2017-05-24 DIAGNOSIS — R53.1 WEAKNESS: ICD-10-CM

## 2017-05-24 DIAGNOSIS — Z79.82 LONG TERM (CURRENT) USE OF ASPIRIN: ICD-10-CM

## 2017-05-24 DIAGNOSIS — Z88.0 ALLERGY STATUS TO PENICILLIN: ICD-10-CM

## 2017-05-24 DIAGNOSIS — Z98.890 OTHER SPECIFIED POSTPROCEDURAL STATES: ICD-10-CM

## 2017-05-24 DIAGNOSIS — I10 ESSENTIAL (PRIMARY) HYPERTENSION: ICD-10-CM

## 2017-05-24 DIAGNOSIS — E03.9 HYPOTHYROIDISM, UNSPECIFIED: ICD-10-CM

## 2017-05-24 DIAGNOSIS — Z79.899 OTHER LONG TERM (CURRENT) DRUG THERAPY: ICD-10-CM

## 2017-05-24 LAB
ALBUMIN SERPL ELPH-MCNC: 4.2 G/DL — SIGNIFICANT CHANGE UP (ref 3.3–5)
ALP SERPL-CCNC: 88 U/L — SIGNIFICANT CHANGE UP (ref 40–120)
ALT FLD-CCNC: 30 U/L RC — SIGNIFICANT CHANGE UP (ref 10–45)
ANION GAP SERPL CALC-SCNC: 14 MMOL/L — SIGNIFICANT CHANGE UP (ref 5–17)
AST SERPL-CCNC: 51 U/L — HIGH (ref 10–40)
BILIRUB SERPL-MCNC: 0.4 MG/DL — SIGNIFICANT CHANGE UP (ref 0.2–1.2)
BUN SERPL-MCNC: 10 MG/DL — SIGNIFICANT CHANGE UP (ref 7–23)
CALCIUM SERPL-MCNC: 9.4 MG/DL — SIGNIFICANT CHANGE UP (ref 8.4–10.5)
CHLORIDE SERPL-SCNC: 91 MMOL/L — LOW (ref 96–108)
CO2 SERPL-SCNC: 27 MMOL/L — SIGNIFICANT CHANGE UP (ref 22–31)
CREAT SERPL-MCNC: 0.99 MG/DL — SIGNIFICANT CHANGE UP (ref 0.5–1.3)
ETHANOL SERPL-MCNC: SIGNIFICANT CHANGE UP MG/DL (ref 0–10)
GLUCOSE SERPL-MCNC: 99 MG/DL — SIGNIFICANT CHANGE UP (ref 70–99)
POTASSIUM SERPL-MCNC: 3.4 MMOL/L — LOW (ref 3.5–5.3)
POTASSIUM SERPL-SCNC: 3.4 MMOL/L — LOW (ref 3.5–5.3)
PROT SERPL-MCNC: 7.1 G/DL — SIGNIFICANT CHANGE UP (ref 6–8.3)
SODIUM SERPL-SCNC: 132 MMOL/L — LOW (ref 135–145)

## 2017-05-24 PROCEDURE — 70450 CT HEAD/BRAIN W/O DYE: CPT

## 2017-05-24 PROCEDURE — 99284 EMERGENCY DEPT VISIT MOD MDM: CPT | Mod: 25

## 2017-05-24 PROCEDURE — 80307 DRUG TEST PRSMV CHEM ANLYZR: CPT

## 2017-05-24 PROCEDURE — 93005 ELECTROCARDIOGRAM TRACING: CPT

## 2017-05-24 PROCEDURE — 70450 CT HEAD/BRAIN W/O DYE: CPT | Mod: 26

## 2017-05-24 PROCEDURE — 71010: CPT | Mod: 26

## 2017-05-24 PROCEDURE — 99284 EMERGENCY DEPT VISIT MOD MDM: CPT

## 2017-05-24 PROCEDURE — 71045 X-RAY EXAM CHEST 1 VIEW: CPT

## 2017-05-24 PROCEDURE — 80053 COMPREHEN METABOLIC PANEL: CPT

## 2017-05-24 PROCEDURE — 85027 COMPLETE CBC AUTOMATED: CPT

## 2017-05-24 NOTE — ED PROVIDER NOTE - PSH
Cancer  Diapp-z-jlxm insertion  H/O brain surgery  7/9/15 - removal of brain tumor--post op received gamma knife followed by RT  History of lung biopsy  left   2014 EBUS  positive lung cancer  History of lung surgery  left wedge 1/15 --- diagnosed with mets to brain and right wrist

## 2017-05-24 NOTE — ED PROVIDER NOTE - PHYSICAL EXAMINATION
Attending MD Chawla: A & O x 3, NAD, HEENT WNL and no facial asymmetry; lungs CTAB, heart with reg rhythm without murmur; abdomen soft NTND; extremities with no edema; neuro exam non focal with no motor or sensory deficits.

## 2017-05-24 NOTE — ED PROVIDER NOTE - PLAN OF CARE
You were seen in the ED for shaking of the arms and legs. You were seen by our neurologists who do not think this was a seizure. Please continue to take your seizure medications.     Please see your neurology doctor in 2-3 days. Return for any concerns to the ED

## 2017-05-24 NOTE — ED PROVIDER NOTE - CARE PLAN
Principal Discharge DX:	Shaking  Instructions for follow-up, activity and diet:	You were seen in the ED for shaking of the arms and legs. You were seen by our neurologists who do not think this was a seizure. Please continue to take your seizure medications.     Please see your neurology doctor in 2-3 days. Return for any concerns to the ED

## 2017-05-24 NOTE — ED PROVIDER NOTE - MEDICAL DECISION MAKING DETAILS
Attending MD Chawla: 62F with lung CA with mets to the brain, seizure disorder on Keppra presents with uncontrolled shaknig of the arms and legs x 2 tonight, no LOC, ddx includes complex partial seizure, will obtain CT head to r/o new mets or ICH, neurology consult from Dr. Vidal.

## 2017-05-24 NOTE — ED PROVIDER NOTE - OBJECTIVE STATEMENT
62F with lung CA with mets to brain, seizure disorder on Keppra presenting with "shaking" of arms and legs x 2 tonight. Patient is awake during episodes, lasted 1-2 minutes each time. Last episode was in waiting room. patient denies headaches, cough, f/c, lateralizing weakness. has not missed any doses of Keppra     Neurology Dr. Vidal   Onc Dr. Banuelos

## 2017-05-24 NOTE — ED PROVIDER NOTE - PROGRESS NOTE DETAILS
Attending MD Chawla: case discussed with Dr. Vidal (covering for Dr Vidal), she recommends discussion with house neurology if neurology consult is required Attending MD Chawla: house neurology has seen patient, they do not think patient suffered a seizure, more like anxiety reaction. They do not recommend change in AEDs, will await CT head read

## 2017-05-24 NOTE — ED ADULT NURSE NOTE - OBJECTIVE STATEMENT
Pt is a 63 yo female with a hx of lung ca with mets to the brain coming in co "seizures". Pts  witnessed the seizure and reports pt was moving hands uncontrollably but was able to speak during the episodes, her feet were mildly shaking. Pt reports the first episode happening 1 hour ago and then a similar episode happening in the waiting room. Pts  states he holds her and she slows stops shaking. Pt denies any CP or SOB no N/V/D no cough fever or chills no headache or dizziness. Pts extremities are strong bilaterally, pupils equal and brisk, She is A&Ox4. Pt has a pmh of depression, Lung ca, seizures,

## 2017-05-25 VITALS
RESPIRATION RATE: 18 BRPM | SYSTOLIC BLOOD PRESSURE: 130 MMHG | HEART RATE: 65 BPM | DIASTOLIC BLOOD PRESSURE: 87 MMHG | TEMPERATURE: 98 F | OXYGEN SATURATION: 100 %

## 2017-05-25 LAB
APAP SERPL-MCNC: <15 UG/ML — SIGNIFICANT CHANGE UP (ref 10–30)
BASOPHILS # BLD AUTO: 0.1 K/UL — SIGNIFICANT CHANGE UP (ref 0–0.2)
BASOPHILS NFR BLD AUTO: 1 % — SIGNIFICANT CHANGE UP (ref 0–2)
EOSINOPHIL # BLD AUTO: 0.1 K/UL — SIGNIFICANT CHANGE UP (ref 0–0.5)
EOSINOPHIL NFR BLD AUTO: 0.7 % — SIGNIFICANT CHANGE UP (ref 0–6)
HCT VFR BLD CALC: 30.8 % — LOW (ref 34.5–45)
HGB BLD-MCNC: 11 G/DL — LOW (ref 11.5–15.5)
LYMPHOCYTES # BLD AUTO: 1.4 K/UL — SIGNIFICANT CHANGE UP (ref 1–3.3)
LYMPHOCYTES # BLD AUTO: 17.4 % — SIGNIFICANT CHANGE UP (ref 13–44)
MCHC RBC-ENTMCNC: 35.9 GM/DL — SIGNIFICANT CHANGE UP (ref 32–36)
MCHC RBC-ENTMCNC: 36.4 PG — HIGH (ref 27–34)
MCV RBC AUTO: 101 FL — HIGH (ref 80–100)
MONOCYTES # BLD AUTO: 0.8 K/UL — SIGNIFICANT CHANGE UP (ref 0–0.9)
MONOCYTES NFR BLD AUTO: 9.4 % — SIGNIFICANT CHANGE UP (ref 2–14)
NEUTROPHILS # BLD AUTO: 5.8 K/UL — SIGNIFICANT CHANGE UP (ref 1.8–7.4)
NEUTROPHILS NFR BLD AUTO: 71.5 % — SIGNIFICANT CHANGE UP (ref 43–77)
PARANEOPLASTIC AB PNL SER: SIGNIFICANT CHANGE UP
PLATELET # BLD AUTO: 355 K/UL — SIGNIFICANT CHANGE UP (ref 150–400)
RBC # BLD: 3.04 M/UL — LOW (ref 3.8–5.2)
RBC # FLD: 15.2 % — HIGH (ref 10.3–14.5)
SALICYLATES SERPL-MCNC: <2 MG/DL — LOW (ref 15–30)
WBC # BLD: 8.2 K/UL — SIGNIFICANT CHANGE UP (ref 3.8–10.5)
WBC # FLD AUTO: 8.2 K/UL — SIGNIFICANT CHANGE UP (ref 3.8–10.5)

## 2017-05-25 NOTE — ED ADULT NURSE REASSESSMENT NOTE - NS ED NURSE REASSESS COMMENT FT1
Pt AAOx4, NAD, resting comfortably in bed with spouse at bedside. Pt denies headache, dizziness, chest pain, cough, SOB, abdominal pain, n/v/d, urinary symptoms, fevers, chills, weakness at this time. Pt discharged as per MD, IV removed as per MD, pt ambulated independently out of ED.

## 2017-05-26 ENCOUNTER — APPOINTMENT (OUTPATIENT)
Dept: CT IMAGING | Facility: CLINIC | Age: 62
End: 2017-05-26

## 2017-05-26 ENCOUNTER — OUTPATIENT (OUTPATIENT)
Dept: OUTPATIENT SERVICES | Facility: HOSPITAL | Age: 62
LOS: 1 days | End: 2017-05-26
Payer: MEDICARE

## 2017-05-26 DIAGNOSIS — C80.1 MALIGNANT (PRIMARY) NEOPLASM, UNSPECIFIED: Chronic | ICD-10-CM

## 2017-05-26 DIAGNOSIS — Z98.89 OTHER SPECIFIED POSTPROCEDURAL STATES: Chronic | ICD-10-CM

## 2017-05-26 DIAGNOSIS — Z00.8 ENCOUNTER FOR OTHER GENERAL EXAMINATION: ICD-10-CM

## 2017-05-26 PROCEDURE — 82565 ASSAY OF CREATININE: CPT

## 2017-05-26 PROCEDURE — 71260 CT THORAX DX C+: CPT

## 2017-05-30 ENCOUNTER — EMERGENCY (EMERGENCY)
Facility: HOSPITAL | Age: 62
LOS: 1 days | Discharge: HOME | End: 2017-05-30
Attending: EMERGENCY MEDICINE | Admitting: EMERGENCY MEDICINE
Payer: MEDICARE

## 2017-05-30 VITALS
RESPIRATION RATE: 16 BRPM | DIASTOLIC BLOOD PRESSURE: 79 MMHG | HEART RATE: 79 BPM | OXYGEN SATURATION: 99 % | TEMPERATURE: 98 F | SYSTOLIC BLOOD PRESSURE: 119 MMHG

## 2017-05-30 VITALS
OXYGEN SATURATION: 95 % | DIASTOLIC BLOOD PRESSURE: 94 MMHG | HEART RATE: 71 BPM | RESPIRATION RATE: 18 BRPM | SYSTOLIC BLOOD PRESSURE: 144 MMHG

## 2017-05-30 DIAGNOSIS — Z98.89 OTHER SPECIFIED POSTPROCEDURAL STATES: Chronic | ICD-10-CM

## 2017-05-30 DIAGNOSIS — S52.90XA UNSPECIFIED FRACTURE OF UNSPECIFIED FOREARM, INITIAL ENCOUNTER FOR CLOSED FRACTURE: ICD-10-CM

## 2017-05-30 DIAGNOSIS — C80.1 MALIGNANT (PRIMARY) NEOPLASM, UNSPECIFIED: Chronic | ICD-10-CM

## 2017-05-30 LAB
ALBUMIN SERPL ELPH-MCNC: 4.2 G/DL — SIGNIFICANT CHANGE UP (ref 3.3–5)
ALP SERPL-CCNC: 86 U/L — SIGNIFICANT CHANGE UP (ref 40–120)
ALT FLD-CCNC: 33 U/L RC — SIGNIFICANT CHANGE UP (ref 10–45)
ANION GAP SERPL CALC-SCNC: 14 MMOL/L — SIGNIFICANT CHANGE UP (ref 5–17)
ANION GAP SERPL CALC-SCNC: 14 MMOL/L — SIGNIFICANT CHANGE UP (ref 5–17)
APTT BLD: 30.5 SEC — SIGNIFICANT CHANGE UP (ref 27.5–37.4)
AST SERPL-CCNC: 67 U/L — HIGH (ref 10–40)
BASOPHILS # BLD AUTO: 0 K/UL — SIGNIFICANT CHANGE UP (ref 0–0.2)
BASOPHILS NFR BLD AUTO: 0.2 % — SIGNIFICANT CHANGE UP (ref 0–2)
BILIRUB SERPL-MCNC: 0.4 MG/DL — SIGNIFICANT CHANGE UP (ref 0.2–1.2)
BUN SERPL-MCNC: 7 MG/DL — SIGNIFICANT CHANGE UP (ref 7–23)
BUN SERPL-MCNC: 8 MG/DL — SIGNIFICANT CHANGE UP (ref 7–23)
CALCIUM SERPL-MCNC: 9.2 MG/DL — SIGNIFICANT CHANGE UP (ref 8.4–10.5)
CALCIUM SERPL-MCNC: 9.6 MG/DL — SIGNIFICANT CHANGE UP (ref 8.4–10.5)
CHLORIDE SERPL-SCNC: 87 MMOL/L — LOW (ref 96–108)
CHLORIDE SERPL-SCNC: 92 MMOL/L — LOW (ref 96–108)
CO2 SERPL-SCNC: 27 MMOL/L — SIGNIFICANT CHANGE UP (ref 22–31)
CO2 SERPL-SCNC: 27 MMOL/L — SIGNIFICANT CHANGE UP (ref 22–31)
CREAT SERPL-MCNC: 0.82 MG/DL — SIGNIFICANT CHANGE UP (ref 0.5–1.3)
CREAT SERPL-MCNC: 0.82 MG/DL — SIGNIFICANT CHANGE UP (ref 0.5–1.3)
EOSINOPHIL # BLD AUTO: 0 K/UL — SIGNIFICANT CHANGE UP (ref 0–0.5)
EOSINOPHIL NFR BLD AUTO: 0.2 % — SIGNIFICANT CHANGE UP (ref 0–6)
ERYTHROCYTE [SEDIMENTATION RATE] IN BLOOD: 108 MM/HR — HIGH (ref 0–20)
GLUCOSE SERPL-MCNC: 82 MG/DL — SIGNIFICANT CHANGE UP (ref 70–99)
GLUCOSE SERPL-MCNC: 91 MG/DL — SIGNIFICANT CHANGE UP (ref 70–99)
HCT VFR BLD CALC: 28.8 % — LOW (ref 34.5–45)
HCT VFR BLD CALC: 29.8 % — LOW (ref 34.5–45)
HGB BLD-MCNC: 10.1 G/DL — LOW (ref 11.5–15.5)
HGB BLD-MCNC: 10.1 G/DL — LOW (ref 11.5–15.5)
INR BLD: 1.09 RATIO — SIGNIFICANT CHANGE UP (ref 0.88–1.16)
LYMPHOCYTES # BLD AUTO: 1.1 K/UL — SIGNIFICANT CHANGE UP (ref 1–3.3)
LYMPHOCYTES # BLD AUTO: 15.9 % — SIGNIFICANT CHANGE UP (ref 13–44)
MCHC RBC-ENTMCNC: 33.8 GM/DL — SIGNIFICANT CHANGE UP (ref 32–36)
MCHC RBC-ENTMCNC: 34.5 PG — HIGH (ref 27–34)
MCHC RBC-ENTMCNC: 35.1 GM/DL — SIGNIFICANT CHANGE UP (ref 32–36)
MCHC RBC-ENTMCNC: 36.1 PG — HIGH (ref 27–34)
MCV RBC AUTO: 102 FL — HIGH (ref 80–100)
MCV RBC AUTO: 103 FL — HIGH (ref 80–100)
MONOCYTES # BLD AUTO: 0.5 K/UL — SIGNIFICANT CHANGE UP (ref 0–0.9)
MONOCYTES NFR BLD AUTO: 8.1 % — SIGNIFICANT CHANGE UP (ref 2–14)
NEUTROPHILS # BLD AUTO: 5.1 K/UL — SIGNIFICANT CHANGE UP (ref 1.8–7.4)
NEUTROPHILS NFR BLD AUTO: 75.6 % — SIGNIFICANT CHANGE UP (ref 43–77)
PLATELET # BLD AUTO: 258 K/UL — SIGNIFICANT CHANGE UP (ref 150–400)
PLATELET # BLD AUTO: 290 K/UL — SIGNIFICANT CHANGE UP (ref 150–400)
POTASSIUM SERPL-MCNC: 3.2 MMOL/L — LOW (ref 3.5–5.3)
POTASSIUM SERPL-MCNC: 3.3 MMOL/L — LOW (ref 3.5–5.3)
POTASSIUM SERPL-SCNC: 3.2 MMOL/L — LOW (ref 3.5–5.3)
POTASSIUM SERPL-SCNC: 3.3 MMOL/L — LOW (ref 3.5–5.3)
PROT SERPL-MCNC: 7.5 G/DL — SIGNIFICANT CHANGE UP (ref 6–8.3)
PROTHROM AB SERPL-ACNC: 11.9 SEC — SIGNIFICANT CHANGE UP (ref 9.8–12.7)
RBC # BLD: 2.8 M/UL — LOW (ref 3.8–5.2)
RBC # BLD: 2.92 M/UL — LOW (ref 3.8–5.2)
RBC # FLD: 15.4 % — HIGH (ref 10.3–14.5)
RBC # FLD: 15.5 % — HIGH (ref 10.3–14.5)
SODIUM SERPL-SCNC: 128 MMOL/L — LOW (ref 135–145)
SODIUM SERPL-SCNC: 133 MMOL/L — LOW (ref 135–145)
WBC # BLD: 6.4 K/UL — SIGNIFICANT CHANGE UP (ref 3.8–10.5)
WBC # BLD: 6.7 K/UL — SIGNIFICANT CHANGE UP (ref 3.8–10.5)
WBC # FLD AUTO: 6.4 K/UL — SIGNIFICANT CHANGE UP (ref 3.8–10.5)
WBC # FLD AUTO: 6.7 K/UL — SIGNIFICANT CHANGE UP (ref 3.8–10.5)

## 2017-05-30 PROCEDURE — 73090 X-RAY EXAM OF FOREARM: CPT | Mod: 26,LT

## 2017-05-30 PROCEDURE — 71010: CPT | Mod: 26

## 2017-05-30 PROCEDURE — 73201 CT UPPER EXTREMITY W/DYE: CPT | Mod: 26,RT

## 2017-05-30 PROCEDURE — 73110 X-RAY EXAM OF WRIST: CPT | Mod: 26,RT

## 2017-05-30 RX ORDER — POTASSIUM CHLORIDE 20 MEQ
40 PACKET (EA) ORAL ONCE
Qty: 0 | Refills: 0 | Status: COMPLETED | OUTPATIENT
Start: 2017-05-30 | End: 2017-05-30

## 2017-05-30 RX ADMIN — Medication 40 MILLIEQUIVALENT(S): at 20:09

## 2017-05-30 NOTE — ED PROVIDER NOTE - PROGRESS NOTE DETAILS
pt states does not wish to be admitted, will f/up OP with dr. bar. ortho resident to place splint, will d/c home.

## 2017-05-30 NOTE — ED PROVIDER NOTE - OBJECTIVE STATEMENT
61yo female, right hand dominant, history of lung cancer w/ bone mets, brain mets p/w right wrist redness. Pt. had surgery in right wrist, now s/p radiation. 1 week prior, pt. reporting right wrist edema, decreased movement. Ortho: Harish Savage. Denies fevers, trauma, . Pt. reports decreased movement on hand, increasing numbness, increasing pain. Onc: Alfred Mendiola. Last chemo last Thursday.

## 2017-05-30 NOTE — ED ADULT NURSE NOTE - PSH
Cancer  Ufrdp-q-wemh insertion  H/O brain surgery  7/9/15 - removal of brain tumor--post op received gamma knife followed by RT  History of lung biopsy  left   2014 EBUS  positive lung cancer  History of lung surgery  left wedge 1/15 --- diagnosed with mets to brain and right wrist

## 2017-05-30 NOTE — ED PROVIDER NOTE - MEDICAL DECISION MAKING DETAILS
62f pmhx HTN, HLD, hypothyroid, lung Ca with mets to bone since 2014 p/w acute on chronic R forearm pain. is [facility] oncology pt of dr. oliver bar underwent multiple repairs of R forearm. states 4-5 days ago, styarted to have deformity, increased pain a/w redness and swelling.  states pt lifts self with affected forearm, known to have metastatic dz. denies fever/chills, CP/SOB. r/o infection, will obtain basics, CRP/ESR, XR forearm, contact dr. bar

## 2017-05-30 NOTE — ED ADULT NURSE REASSESSMENT NOTE - NS ED NURSE REASSESS COMMENT FT1
pt received from yusuf soto RN.  Pt with R chest wall port accessed and KVO fluids running, pt ambulatory to and from bathroom without difficulty, pt reports no pain at this time, MD Bowman in room discussing surgery options with pt and , as of now surgery time not available until Friday evening, Colby recommends pt stay inpt until that time, pt and  prefer to have wrist splinted and return to ER Friday.

## 2017-05-30 NOTE — ED PROVIDER NOTE - PSH
Cancer  Begpw-y-umhe insertion  H/O brain surgery  7/9/15 - removal of brain tumor--post op received gamma knife followed by RT  History of lung biopsy  left   2014 EBUS  positive lung cancer  History of lung surgery  left wedge 1/15 --- diagnosed with mets to brain and right wrist

## 2017-05-30 NOTE — ED ADULT NURSE NOTE - OBJECTIVE STATEMENT
61 Y/O F via walkin with  presenting with right arm swelling/tenderness/pain that started 1 week ago as per pt. Pt states she has a hx of lung cancer mets to brain and bone. Pt states she has pain and weakness to the area with decrease sensation. Pt states the swelling started in the wrist and traveled up to her arm. She states the last chemo was Thursday

## 2017-05-31 ENCOUNTER — RX RENEWAL (OUTPATIENT)
Age: 62
End: 2017-05-31

## 2017-05-31 LAB — CRP SERPL-MCNC: 3.7 MG/DL — HIGH (ref 0–0.4)

## 2017-06-01 ENCOUNTER — OUTPATIENT (OUTPATIENT)
Dept: OUTPATIENT SERVICES | Facility: HOSPITAL | Age: 62
LOS: 1 days | End: 2017-06-01

## 2017-06-01 ENCOUNTER — APPOINTMENT (OUTPATIENT)
Dept: CARDIOLOGY | Facility: CLINIC | Age: 62
End: 2017-06-01

## 2017-06-01 VITALS
OXYGEN SATURATION: 100 % | TEMPERATURE: 98 F | SYSTOLIC BLOOD PRESSURE: 122 MMHG | WEIGHT: 116.84 LBS | RESPIRATION RATE: 20 BRPM | HEIGHT: 66 IN | HEART RATE: 74 BPM | DIASTOLIC BLOOD PRESSURE: 85 MMHG

## 2017-06-01 DIAGNOSIS — Z96.631 PRESENCE OF RIGHT ARTIFICIAL WRIST JOINT: ICD-10-CM

## 2017-06-01 DIAGNOSIS — C80.1 MALIGNANT (PRIMARY) NEOPLASM, UNSPECIFIED: Chronic | ICD-10-CM

## 2017-06-01 DIAGNOSIS — Z98.890 OTHER SPECIFIED POSTPROCEDURAL STATES: Chronic | ICD-10-CM

## 2017-06-01 DIAGNOSIS — Z01.818 ENCOUNTER FOR OTHER PREPROCEDURAL EXAMINATION: ICD-10-CM

## 2017-06-01 DIAGNOSIS — E03.9 HYPOTHYROIDISM, UNSPECIFIED: ICD-10-CM

## 2017-06-01 DIAGNOSIS — Z98.89 OTHER SPECIFIED POSTPROCEDURAL STATES: Chronic | ICD-10-CM

## 2017-06-01 DIAGNOSIS — C41.9 MALIGNANT NEOPLASM OF BONE AND ARTICULAR CARTILAGE, UNSPECIFIED: ICD-10-CM

## 2017-06-01 DIAGNOSIS — C34.90 MALIGNANT NEOPLASM OF UNSPECIFIED PART OF UNSPECIFIED BRONCHUS OR LUNG: ICD-10-CM

## 2017-06-01 DIAGNOSIS — Z87.898 PERSONAL HISTORY OF OTHER SPECIFIED CONDITIONS: ICD-10-CM

## 2017-06-01 DIAGNOSIS — C79.51 SECONDARY MALIGNANT NEOPLASM OF BONE: ICD-10-CM

## 2017-06-01 NOTE — H&P PST ADULT - HISTORY OF PRESENT ILLNESS
This is a 63 y/o female who presents with significant h/o left lung cancer diagnosed in 2014. Initially treated with chemo and RT followed by surgery pt currently on chemo therapy last chemo was 5/25/2017. Then in June 2015, diagnosed with metastatic cancer of right wrist. Treated with Gamma knife followed by RT. In July 2015, diagnosed with metastatic cancer of the brain. Treated with surgery followed with gamma knife and immune therapy. Presents with right wrist tumor. Subsequent xray and PET scan confirmed pathology. s/p exploration resection lesion right wrist on 2016. Exploration/removal of Hardware Right Distal radius With Internal Fixation on 6/2/2017 This is a 61 y/o female who presents with significant h/o left lung cancer diagnosed in 2014. Initially treated with chemo and RT followed by surgery, currently on chemo therapy last chemo was 5/25/2017. Then in June 2015, diagnosed with metastatic cancer of right wrist. Treated with Gamma knife followed by RT. In July 2015, diagnosed with metastatic cancer of the brain. Treated with surgery followed with gamma knife and immune therapy. Presents with right wrist tumor. Subsequent xray and PET scan confirmed pathology. s/p exploration resection lesion right wrist on 2016.pt complains of wrist swelling, pain F/U with Ortho ,Now coming in PST for scheduled  Exploration/removal of Hardware Right Distal radius With Internal Fixation on 6/2/2017. This is a 63 y/o female who presents with significant h/o left lung cancer diagnosed in 2014. Initially treated with chemo and RT followed by surgery, currently on chemo therapy last chemo was 5/25/2017. Then in June 2015, diagnosed with metastatic cancer of right wrist. Treated with Gamma knife followed by RT. In July 2015, diagnosed with metastatic cancer of the brain. Treated with surgery followed with gamma knife and immune therapy. Presents with right wrist tumor. Subsequent xray and PET scan confirmed pathology. s/p exploration resection lesion right wrist on 2016.Pt complains of wrist swelling, pain, decreased movement on hand, increasing numbness s/p recent ER  visit and  F/U with Ortho ,Now coming in PST for scheduled  Exploration/removal of Hardware Right Distal radius With Internal Fixation on 6/2/2017.

## 2017-06-01 NOTE — H&P PST ADULT - REASON FOR ADMISSION
they are operating on the tumor in the right wrist "they are operating on my right wrist and removing hardware"

## 2017-06-01 NOTE — H&P PST ADULT - NSANTHOSAYNRD_GEN_A_CORE
No. TATIANA screening performed.  STOP BANG Legend: 0-2 = LOW Risk; 3-4 = INTERMEDIATE Risk; 5-8 = HIGH Risk

## 2017-06-01 NOTE — H&P PST ADULT - PMH
Anxiety    Brain tumor  s/p brain surgery in 2015  Depression    History of seizure  occurred before brain surgery -- last seizure July 2015  Hyperlipidemia    Hypertension    Hyponatremia    Hypothyroidism    Lung cancer  diagnosed in 2014 --  mets to bone, brain s/p craniotomy for brain met resection, lung wedge resection and right forearm met resection. also s/p xrt  Currently on chemo last chemo was 5/25/2017 will completeed end of June  Lung cancer, left  biopsy  chemo cisplatin/Alimta 11/2014  radiation 2014  Malignant neoplasm of bone  Broken internal joint prothesis  Mass  right wrist mass in November 2016  Snoring  TATIANA precautions -- responds affirmatively to STOP BANG questionnaire -- admits to loud snoring; age > 50; Anxiety    Brain tumor  s/p brain surgery in 2015  Depression    Heart murmur  history F/u cardiac work up - asprin 81 mg for cardiac prophylaxis  History of seizure  occurred before brain surgery -- last seizure July 2015  Hyperlipidemia    Hypertension    Hyponatremia    Hypothyroidism    Lung cancer  diagnosed in 2014 --  mets to bone, brain s/p craniotomy for brain met resection, lung wedge resection and right forearm met resection. also s/p xrt  Currently on chemo last chemo was 5/25/2017 will completeed end of June  Lung cancer, left  biopsy  chemo cisplatin/Alimta 11/2014  radiation 2014  Malignant neoplasm of bone  Broken internal joint prothesis  Mass  right wrist mass in November 2016

## 2017-06-01 NOTE — H&P PST ADULT - PROBLEM SELECTOR PLAN 1
Exploration/removal of Hardware Right Distal radius With Internal Fixation on 6/2/2017  Pre- Op instructions given   Recent labs in [place]

## 2017-06-01 NOTE — H&P PST ADULT - RS GEN PE MLT RESP DETAILS PC
clear to auscultation bilaterally/normal/respirations non-labored/good air movement/breath sounds equal/airway patent

## 2017-06-01 NOTE — H&P PST ADULT - MALLAMPATI CLASS
Class I (easy) - visualization of the soft palate, fauces, uvula, and both anterior and posterior pillars/I  with phonation

## 2017-06-01 NOTE — H&P PST ADULT - NEGATIVE NEUROLOGICAL SYMPTOMS
no loss of sensation/no paresthesias/no transient paralysis/no weakness/no syncope/no facial palsy/no headache/no generalized seizures/no vertigo/no difficulty walking/no tremors/no confusion/no loss of consciousness/no hemiparesis

## 2017-06-01 NOTE — H&P PST ADULT - PSH
Cancer  [id number] insertion  H/O brain surgery  7/9/15 - removal of brain tumor--post op received gamma knife followed by RT  History of lung biopsy  left   2014 EBUS  positive lung cancer  History of lung surgery  left wedge 1/15 --- diagnosed with mets to brain and right wrist  S/P wrist surgery  x 2 in prior secondary to metastasis

## 2017-06-02 ENCOUNTER — INPATIENT (INPATIENT)
Facility: HOSPITAL | Age: 62
LOS: 3 days | Discharge: HOME | DRG: 496 | End: 2017-06-06
Attending: ORTHOPAEDIC SURGERY | Admitting: ORTHOPAEDIC SURGERY
Payer: MEDICARE

## 2017-06-02 ENCOUNTER — APPOINTMENT (OUTPATIENT)
Dept: ORTHOPEDIC SURGERY | Facility: HOSPITAL | Age: 62
End: 2017-06-02

## 2017-06-02 ENCOUNTER — RESULT REVIEW (OUTPATIENT)
Age: 62
End: 2017-06-02

## 2017-06-02 VITALS
DIASTOLIC BLOOD PRESSURE: 78 MMHG | SYSTOLIC BLOOD PRESSURE: 122 MMHG | HEART RATE: 74 BPM | WEIGHT: 115.96 LBS | OXYGEN SATURATION: 97 % | RESPIRATION RATE: 19 BRPM | HEIGHT: 66 IN | TEMPERATURE: 98 F

## 2017-06-02 DIAGNOSIS — Z98.89 OTHER SPECIFIED POSTPROCEDURAL STATES: Chronic | ICD-10-CM

## 2017-06-02 DIAGNOSIS — Z96.631 PRESENCE OF RIGHT ARTIFICIAL WRIST JOINT: ICD-10-CM

## 2017-06-02 DIAGNOSIS — C79.51 SECONDARY MALIGNANT NEOPLASM OF BONE: ICD-10-CM

## 2017-06-02 DIAGNOSIS — Z98.890 OTHER SPECIFIED POSTPROCEDURAL STATES: Chronic | ICD-10-CM

## 2017-06-02 DIAGNOSIS — C80.1 MALIGNANT (PRIMARY) NEOPLASM, UNSPECIFIED: Chronic | ICD-10-CM

## 2017-06-02 LAB
ANION GAP SERPL CALC-SCNC: 12 MMOL/L — SIGNIFICANT CHANGE UP (ref 5–17)
BLD GP AB SCN SERPL QL: NEGATIVE — SIGNIFICANT CHANGE UP
BUN SERPL-MCNC: 10 MG/DL — SIGNIFICANT CHANGE UP (ref 7–23)
CALCIUM SERPL-MCNC: 9 MG/DL — SIGNIFICANT CHANGE UP (ref 8.4–10.5)
CHLORIDE SERPL-SCNC: 99 MMOL/L — SIGNIFICANT CHANGE UP (ref 96–108)
CO2 SERPL-SCNC: 26 MMOL/L — SIGNIFICANT CHANGE UP (ref 22–31)
CREAT SERPL-MCNC: 1 MG/DL — SIGNIFICANT CHANGE UP (ref 0.5–1.3)
GLUCOSE SERPL-MCNC: 102 MG/DL — HIGH (ref 70–99)
HCT VFR BLD CALC: 27.6 % — LOW (ref 34.5–45)
HGB BLD-MCNC: 9.5 G/DL — LOW (ref 11.5–15.5)
MCHC RBC-ENTMCNC: 34.6 GM/DL — SIGNIFICANT CHANGE UP (ref 32–36)
MCHC RBC-ENTMCNC: 35.9 PG — HIGH (ref 27–34)
MCV RBC AUTO: 104 FL — HIGH (ref 80–100)
PLATELET # BLD AUTO: 307 K/UL — SIGNIFICANT CHANGE UP (ref 150–400)
POTASSIUM SERPL-MCNC: 4 MMOL/L — SIGNIFICANT CHANGE UP (ref 3.5–5.3)
POTASSIUM SERPL-SCNC: 4 MMOL/L — SIGNIFICANT CHANGE UP (ref 3.5–5.3)
RBC # BLD: 2.65 M/UL — LOW (ref 3.8–5.2)
RBC # FLD: 15.8 % — HIGH (ref 10.3–14.5)
RH IG SCN BLD-IMP: POSITIVE — SIGNIFICANT CHANGE UP
SODIUM SERPL-SCNC: 137 MMOL/L — SIGNIFICANT CHANGE UP (ref 135–145)
WBC # BLD: 8.4 K/UL — SIGNIFICANT CHANGE UP (ref 3.8–10.5)
WBC # FLD AUTO: 8.4 K/UL — SIGNIFICANT CHANGE UP (ref 3.8–10.5)

## 2017-06-02 PROCEDURE — 25515 OPTX RADIAL SHAFT FRACTURE: CPT | Mod: RT

## 2017-06-02 PROCEDURE — 88300 SURGICAL PATH GROSS: CPT | Mod: 26

## 2017-06-02 PROCEDURE — 64708 REVISE ARM/LEG NERVE: CPT | Mod: 59

## 2017-06-02 PROCEDURE — 20680 REMOVAL OF IMPLANT DEEP: CPT

## 2017-06-02 PROCEDURE — 25170 RESECT RADIUS/ULNAR TUMOR: CPT | Mod: RT

## 2017-06-02 RX ORDER — OXYCODONE HYDROCHLORIDE 5 MG/1
5 TABLET ORAL EVERY 4 HOURS
Qty: 0 | Refills: 0 | Status: DISCONTINUED | OUTPATIENT
Start: 2017-06-02 | End: 2017-06-06

## 2017-06-02 RX ORDER — SODIUM CHLORIDE 9 MG/ML
1000 INJECTION INTRAMUSCULAR; INTRAVENOUS; SUBCUTANEOUS
Qty: 0 | Refills: 0 | Status: DISCONTINUED | OUTPATIENT
Start: 2017-06-02 | End: 2017-06-04

## 2017-06-02 RX ORDER — SODIUM CHLORIDE 9 MG/ML
250 INJECTION, SOLUTION INTRAVENOUS ONCE
Qty: 0 | Refills: 0 | Status: COMPLETED | OUTPATIENT
Start: 2017-06-02 | End: 2017-06-02

## 2017-06-02 RX ORDER — ASPIRIN/CALCIUM CARB/MAGNESIUM 324 MG
81 TABLET ORAL DAILY
Qty: 0 | Refills: 0 | Status: DISCONTINUED | OUTPATIENT
Start: 2017-06-02 | End: 2017-06-06

## 2017-06-02 RX ORDER — VANCOMYCIN HCL 1 G
750 VIAL (EA) INTRAVENOUS ONCE
Qty: 0 | Refills: 0 | Status: COMPLETED | OUTPATIENT
Start: 2017-06-02 | End: 2017-06-02

## 2017-06-02 RX ORDER — ESCITALOPRAM OXALATE 10 MG/1
10 TABLET, FILM COATED ORAL DAILY
Qty: 0 | Refills: 0 | Status: DISCONTINUED | OUTPATIENT
Start: 2017-06-02 | End: 2017-06-06

## 2017-06-02 RX ORDER — MORPHINE SULFATE 50 MG/1
4 CAPSULE, EXTENDED RELEASE ORAL EVERY 4 HOURS
Qty: 0 | Refills: 0 | Status: DISCONTINUED | OUTPATIENT
Start: 2017-06-02 | End: 2017-06-06

## 2017-06-02 RX ORDER — LEVETIRACETAM 250 MG/1
125 TABLET, FILM COATED ORAL
Qty: 0 | Refills: 0 | Status: DISCONTINUED | OUTPATIENT
Start: 2017-06-02 | End: 2017-06-06

## 2017-06-02 RX ORDER — VANCOMYCIN HCL 1 G
750 VIAL (EA) INTRAVENOUS EVERY 12 HOURS
Qty: 0 | Refills: 0 | Status: DISCONTINUED | OUTPATIENT
Start: 2017-06-03 | End: 2017-06-04

## 2017-06-02 RX ORDER — SODIUM CHLORIDE 9 MG/ML
3 INJECTION INTRAMUSCULAR; INTRAVENOUS; SUBCUTANEOUS EVERY 8 HOURS
Qty: 0 | Refills: 0 | Status: DISCONTINUED | OUTPATIENT
Start: 2017-06-02 | End: 2017-06-02

## 2017-06-02 RX ORDER — DOCUSATE SODIUM 100 MG
100 CAPSULE ORAL
Qty: 0 | Refills: 0 | Status: DISCONTINUED | OUTPATIENT
Start: 2017-06-02 | End: 2017-06-06

## 2017-06-02 RX ORDER — PROCHLORPERAZINE MALEATE 5 MG
10 TABLET ORAL EVERY 12 HOURS
Qty: 0 | Refills: 0 | Status: DISCONTINUED | OUTPATIENT
Start: 2017-06-02 | End: 2017-06-06

## 2017-06-02 RX ORDER — LEVOTHYROXINE SODIUM 125 MCG
100 TABLET ORAL DAILY
Qty: 0 | Refills: 0 | Status: DISCONTINUED | OUTPATIENT
Start: 2017-06-02 | End: 2017-06-06

## 2017-06-02 RX ORDER — HYDROMORPHONE HYDROCHLORIDE 2 MG/ML
0.25 INJECTION INTRAMUSCULAR; INTRAVENOUS; SUBCUTANEOUS
Qty: 0 | Refills: 0 | Status: DISCONTINUED | OUTPATIENT
Start: 2017-06-02 | End: 2017-06-02

## 2017-06-02 RX ORDER — OXYCODONE HYDROCHLORIDE 5 MG/1
10 TABLET ORAL EVERY 4 HOURS
Qty: 0 | Refills: 0 | Status: DISCONTINUED | OUTPATIENT
Start: 2017-06-02 | End: 2017-06-06

## 2017-06-02 RX ADMIN — SODIUM CHLORIDE 500 MILLILITER(S): 9 INJECTION, SOLUTION INTRAVENOUS at 20:15

## 2017-06-02 RX ADMIN — MORPHINE SULFATE 4 MILLIGRAM(S): 50 CAPSULE, EXTENDED RELEASE ORAL at 23:03

## 2017-06-02 RX ADMIN — Medication 150 MILLIGRAM(S): at 16:20

## 2017-06-02 RX ADMIN — SODIUM CHLORIDE 500 MILLILITER(S): 9 INJECTION, SOLUTION INTRAVENOUS at 19:12

## 2017-06-02 RX ADMIN — MORPHINE SULFATE 4 MILLIGRAM(S): 50 CAPSULE, EXTENDED RELEASE ORAL at 22:33

## 2017-06-02 NOTE — BRIEF OPERATIVE NOTE - OPERATION/FINDINGS
Findings: Broken hardware, purulence    Procedure: Exploration, removal of hardware, irrigation and debridement, internal fixation right distal radius

## 2017-06-02 NOTE — PATIENT PROFILE ADULT. - CAREGIVER
Pt has not been here since 8/16 do you want to RF his testosterone?              TESTOST CYP 200MG/ML INJ  In chart as: Testosterone Cypionate (DEPOTESTOTERONE CYPIONATE) 200 MG/ML injection  INJECT ONE ML INTRAMUSCULARLY EVERY OTHER WEEK       Disp: Not specified (Pharmacy requested 2 mcg)   Refills: 0     Class: Normal Start: 3/12/2017   To be filled at: [facility] [address] [place] [address] - [phone] [facility] [phone] FXPhone: [phone]     Declines

## 2017-06-03 ENCOUNTER — TRANSCRIPTION ENCOUNTER (OUTPATIENT)
Age: 62
End: 2017-06-03

## 2017-06-03 LAB
BASOPHILS # BLD AUTO: 0.02 K/UL — SIGNIFICANT CHANGE UP (ref 0–0.2)
BASOPHILS NFR BLD AUTO: 0.2 % — SIGNIFICANT CHANGE UP (ref 0–2)
EOSINOPHIL # BLD AUTO: 0.02 K/UL — SIGNIFICANT CHANGE UP (ref 0–0.5)
EOSINOPHIL NFR BLD AUTO: 0.2 % — SIGNIFICANT CHANGE UP (ref 0–6)
HCT VFR BLD CALC: 26.3 % — LOW (ref 34.5–45)
HGB BLD-MCNC: 8.9 G/DL — LOW (ref 11.5–15.5)
IMM GRANULOCYTES NFR BLD AUTO: 0.4 % — SIGNIFICANT CHANGE UP (ref 0–1.5)
LYMPHOCYTES # BLD AUTO: 0.62 K/UL — LOW (ref 1–3.3)
LYMPHOCYTES # BLD AUTO: 7.4 % — LOW (ref 13–44)
MCHC RBC-ENTMCNC: 33.8 GM/DL — SIGNIFICANT CHANGE UP (ref 32–36)
MCHC RBC-ENTMCNC: 33.8 PG — SIGNIFICANT CHANGE UP (ref 27–34)
MCV RBC AUTO: 100 FL — SIGNIFICANT CHANGE UP (ref 80–100)
MONOCYTES # BLD AUTO: 0.6 K/UL — SIGNIFICANT CHANGE UP (ref 0–0.9)
MONOCYTES NFR BLD AUTO: 7.1 % — SIGNIFICANT CHANGE UP (ref 2–14)
NEUTROPHILS # BLD AUTO: 7.11 K/UL — SIGNIFICANT CHANGE UP (ref 1.8–7.4)
NEUTROPHILS NFR BLD AUTO: 84.7 % — HIGH (ref 43–77)
PLATELET # BLD AUTO: 372 K/UL — SIGNIFICANT CHANGE UP (ref 150–400)
RBC # BLD: 2.63 M/UL — LOW (ref 3.8–5.2)
RBC # FLD: 17.9 % — HIGH (ref 10.3–14.5)
WBC # BLD: 8.4 K/UL — SIGNIFICANT CHANGE UP (ref 3.8–10.5)
WBC # FLD AUTO: 8.4 K/UL — SIGNIFICANT CHANGE UP (ref 3.8–10.5)

## 2017-06-03 RX ADMIN — MORPHINE SULFATE 4 MILLIGRAM(S): 50 CAPSULE, EXTENDED RELEASE ORAL at 02:35

## 2017-06-03 RX ADMIN — Medication 150 MILLIGRAM(S): at 04:41

## 2017-06-03 RX ADMIN — OXYCODONE HYDROCHLORIDE 10 MILLIGRAM(S): 5 TABLET ORAL at 06:09

## 2017-06-03 RX ADMIN — SODIUM CHLORIDE 75 MILLILITER(S): 9 INJECTION INTRAMUSCULAR; INTRAVENOUS; SUBCUTANEOUS at 22:52

## 2017-06-03 RX ADMIN — OXYCODONE HYDROCHLORIDE 10 MILLIGRAM(S): 5 TABLET ORAL at 05:39

## 2017-06-03 RX ADMIN — Medication 100 MILLIGRAM(S): at 17:55

## 2017-06-03 RX ADMIN — MORPHINE SULFATE 4 MILLIGRAM(S): 50 CAPSULE, EXTENDED RELEASE ORAL at 14:20

## 2017-06-03 RX ADMIN — Medication 100 MICROGRAM(S): at 05:35

## 2017-06-03 RX ADMIN — LEVETIRACETAM 125 MILLIGRAM(S): 250 TABLET, FILM COATED ORAL at 05:35

## 2017-06-03 RX ADMIN — MORPHINE SULFATE 4 MILLIGRAM(S): 50 CAPSULE, EXTENDED RELEASE ORAL at 03:05

## 2017-06-03 RX ADMIN — Medication 81 MILLIGRAM(S): at 14:06

## 2017-06-03 RX ADMIN — Medication 100 MILLIGRAM(S): at 05:35

## 2017-06-03 RX ADMIN — Medication 150 MILLIGRAM(S): at 16:25

## 2017-06-03 RX ADMIN — LEVETIRACETAM 125 MILLIGRAM(S): 250 TABLET, FILM COATED ORAL at 17:55

## 2017-06-03 RX ADMIN — MORPHINE SULFATE 4 MILLIGRAM(S): 50 CAPSULE, EXTENDED RELEASE ORAL at 14:06

## 2017-06-03 RX ADMIN — ESCITALOPRAM OXALATE 10 MILLIGRAM(S): 10 TABLET, FILM COATED ORAL at 14:06

## 2017-06-04 LAB
ANION GAP SERPL CALC-SCNC: 15 MMOL/L — SIGNIFICANT CHANGE UP (ref 5–17)
BUN SERPL-MCNC: 6 MG/DL — LOW (ref 7–23)
CALCIUM SERPL-MCNC: 7.6 MG/DL — LOW (ref 8.4–10.5)
CHLORIDE SERPL-SCNC: 95 MMOL/L — LOW (ref 96–108)
CO2 SERPL-SCNC: 20 MMOL/L — LOW (ref 22–31)
CREAT SERPL-MCNC: 0.72 MG/DL — SIGNIFICANT CHANGE UP (ref 0.5–1.3)
GLUCOSE SERPL-MCNC: 93 MG/DL — SIGNIFICANT CHANGE UP (ref 70–99)
HCT VFR BLD CALC: 22.9 % — LOW (ref 34.5–45)
HGB BLD-MCNC: 7.9 G/DL — LOW (ref 11.5–15.5)
MCHC RBC-ENTMCNC: 33.6 PG — SIGNIFICANT CHANGE UP (ref 27–34)
MCHC RBC-ENTMCNC: 34.5 GM/DL — SIGNIFICANT CHANGE UP (ref 32–36)
MCV RBC AUTO: 97.4 FL — SIGNIFICANT CHANGE UP (ref 80–100)
PLATELET # BLD AUTO: 355 K/UL — SIGNIFICANT CHANGE UP (ref 150–400)
POTASSIUM SERPL-MCNC: 3 MMOL/L — LOW (ref 3.5–5.3)
POTASSIUM SERPL-SCNC: 3 MMOL/L — LOW (ref 3.5–5.3)
RBC # BLD: 2.35 M/UL — LOW (ref 3.8–5.2)
RBC # FLD: 17.8 % — HIGH (ref 10.3–14.5)
SODIUM SERPL-SCNC: 130 MMOL/L — LOW (ref 135–145)
VANCOMYCIN TROUGH SERPL-MCNC: 6.8 UG/ML — LOW (ref 10–20)
WBC # BLD: 11.45 K/UL — HIGH (ref 3.8–10.5)
WBC # FLD AUTO: 11.45 K/UL — HIGH (ref 3.8–10.5)

## 2017-06-04 RX ORDER — POTASSIUM CHLORIDE 20 MEQ
20 PACKET (EA) ORAL
Qty: 0 | Refills: 0 | Status: COMPLETED | OUTPATIENT
Start: 2017-06-04 | End: 2017-06-04

## 2017-06-04 RX ORDER — VANCOMYCIN HCL 1 G
1000 VIAL (EA) INTRAVENOUS EVERY 12 HOURS
Qty: 0 | Refills: 0 | Status: DISCONTINUED | OUTPATIENT
Start: 2017-06-04 | End: 2017-06-06

## 2017-06-04 RX ADMIN — Medication 100 MILLIGRAM(S): at 06:02

## 2017-06-04 RX ADMIN — Medication 81 MILLIGRAM(S): at 11:07

## 2017-06-04 RX ADMIN — OXYCODONE HYDROCHLORIDE 10 MILLIGRAM(S): 5 TABLET ORAL at 23:23

## 2017-06-04 RX ADMIN — Medication 100 MILLIGRAM(S): at 17:38

## 2017-06-04 RX ADMIN — Medication 150 MILLIGRAM(S): at 06:02

## 2017-06-04 RX ADMIN — OXYCODONE HYDROCHLORIDE 5 MILLIGRAM(S): 5 TABLET ORAL at 14:37

## 2017-06-04 RX ADMIN — MORPHINE SULFATE 4 MILLIGRAM(S): 50 CAPSULE, EXTENDED RELEASE ORAL at 16:15

## 2017-06-04 RX ADMIN — Medication 250 MILLIGRAM(S): at 17:39

## 2017-06-04 RX ADMIN — Medication 20 MILLIEQUIVALENT(S): at 14:00

## 2017-06-04 RX ADMIN — Medication 20 MILLIEQUIVALENT(S): at 09:50

## 2017-06-04 RX ADMIN — LEVETIRACETAM 125 MILLIGRAM(S): 250 TABLET, FILM COATED ORAL at 17:38

## 2017-06-04 RX ADMIN — Medication 100 MICROGRAM(S): at 06:03

## 2017-06-04 RX ADMIN — MORPHINE SULFATE 4 MILLIGRAM(S): 50 CAPSULE, EXTENDED RELEASE ORAL at 15:57

## 2017-06-04 RX ADMIN — OXYCODONE HYDROCHLORIDE 5 MILLIGRAM(S): 5 TABLET ORAL at 14:06

## 2017-06-04 RX ADMIN — LEVETIRACETAM 125 MILLIGRAM(S): 250 TABLET, FILM COATED ORAL at 06:03

## 2017-06-04 RX ADMIN — ESCITALOPRAM OXALATE 10 MILLIGRAM(S): 10 TABLET, FILM COATED ORAL at 11:07

## 2017-06-04 NOTE — PHYSICAL THERAPY INITIAL EVALUATION ADULT - PERTINENT HX OF CURRENT PROBLEM, REHAB EVAL
62F who presents with significant h/o left lung cancer diagnosed in 2014. Initially treated with chemo and RT followed by surgery, currently on chemo therapy last chemo was 5/25/2017. Now s/p Removal of old hardware and exploration of R distal radiius; ORIF of R distal radius fx 6/2/17

## 2017-06-05 LAB
ANION GAP SERPL CALC-SCNC: 16 MMOL/L — SIGNIFICANT CHANGE UP (ref 5–17)
BUN SERPL-MCNC: 7 MG/DL — SIGNIFICANT CHANGE UP (ref 7–23)
CALCIUM SERPL-MCNC: 9.4 MG/DL — SIGNIFICANT CHANGE UP (ref 8.4–10.5)
CHLORIDE SERPL-SCNC: 90 MMOL/L — LOW (ref 96–108)
CO2 SERPL-SCNC: 23 MMOL/L — SIGNIFICANT CHANGE UP (ref 22–31)
CREAT SERPL-MCNC: 0.8 MG/DL — SIGNIFICANT CHANGE UP (ref 0.5–1.3)
GLUCOSE SERPL-MCNC: 104 MG/DL — HIGH (ref 70–99)
HCT VFR BLD CALC: 29.9 % — LOW (ref 34.5–45)
HGB BLD-MCNC: 10.2 G/DL — LOW (ref 11.5–15.5)
MCHC RBC-ENTMCNC: 32.3 PG — SIGNIFICANT CHANGE UP (ref 27–34)
MCHC RBC-ENTMCNC: 34.1 GM/DL — SIGNIFICANT CHANGE UP (ref 32–36)
MCV RBC AUTO: 94.6 FL — SIGNIFICANT CHANGE UP (ref 80–100)
PLATELET # BLD AUTO: 354 K/UL — SIGNIFICANT CHANGE UP (ref 150–400)
POTASSIUM SERPL-MCNC: 3.9 MMOL/L — SIGNIFICANT CHANGE UP (ref 3.5–5.3)
POTASSIUM SERPL-SCNC: 3.9 MMOL/L — SIGNIFICANT CHANGE UP (ref 3.5–5.3)
RBC # BLD: 3.16 M/UL — LOW (ref 3.8–5.2)
RBC # FLD: 19.9 % — HIGH (ref 10.3–14.5)
SODIUM SERPL-SCNC: 129 MMOL/L — LOW (ref 135–145)
WBC # BLD: 10.54 K/UL — HIGH (ref 3.8–10.5)
WBC # FLD AUTO: 10.54 K/UL — HIGH (ref 3.8–10.5)

## 2017-06-05 PROCEDURE — 99223 1ST HOSP IP/OBS HIGH 75: CPT

## 2017-06-05 RX ORDER — SODIUM CHLORIDE 0.65 %
1 AEROSOL, SPRAY (ML) NASAL EVERY 4 HOURS
Qty: 0 | Refills: 0 | Status: DISCONTINUED | OUTPATIENT
Start: 2017-06-05 | End: 2017-06-06

## 2017-06-05 RX ADMIN — LEVETIRACETAM 125 MILLIGRAM(S): 250 TABLET, FILM COATED ORAL at 17:50

## 2017-06-05 RX ADMIN — Medication 81 MILLIGRAM(S): at 12:20

## 2017-06-05 RX ADMIN — Medication 1 SPRAY(S): at 17:42

## 2017-06-05 RX ADMIN — Medication 250 MILLIGRAM(S): at 17:41

## 2017-06-05 RX ADMIN — ESCITALOPRAM OXALATE 10 MILLIGRAM(S): 10 TABLET, FILM COATED ORAL at 12:20

## 2017-06-05 RX ADMIN — Medication 100 MILLIGRAM(S): at 05:53

## 2017-06-05 RX ADMIN — LEVETIRACETAM 125 MILLIGRAM(S): 250 TABLET, FILM COATED ORAL at 05:57

## 2017-06-05 RX ADMIN — OXYCODONE HYDROCHLORIDE 10 MILLIGRAM(S): 5 TABLET ORAL at 00:30

## 2017-06-05 RX ADMIN — Medication 100 MICROGRAM(S): at 05:53

## 2017-06-05 RX ADMIN — Medication 100 MILLIGRAM(S): at 17:45

## 2017-06-05 RX ADMIN — Medication 250 MILLIGRAM(S): at 05:53

## 2017-06-06 LAB
-  AMPICILLIN/SULBACTAM: SIGNIFICANT CHANGE UP
-  CEFAZOLIN: SIGNIFICANT CHANGE UP
-  CIPROFLOXACIN: SIGNIFICANT CHANGE UP
-  CLINDAMYCIN: SIGNIFICANT CHANGE UP
-  ERYTHROMYCIN: SIGNIFICANT CHANGE UP
-  GENTAMICIN: SIGNIFICANT CHANGE UP
-  LEVOFLOXACIN: SIGNIFICANT CHANGE UP
-  MOXIFLOXACIN(AEROBIC): SIGNIFICANT CHANGE UP
-  OXACILLIN: SIGNIFICANT CHANGE UP
-  RIFAMPIN: SIGNIFICANT CHANGE UP
-  TETRACYCLINE: SIGNIFICANT CHANGE UP
-  TRIMETHOPRIM/SULFAMETHOXAZOLE: SIGNIFICANT CHANGE UP
-  VANCOMYCIN: SIGNIFICANT CHANGE UP
ANION GAP SERPL CALC-SCNC: 16 MMOL/L — SIGNIFICANT CHANGE UP (ref 5–17)
BUN SERPL-MCNC: 8 MG/DL — SIGNIFICANT CHANGE UP (ref 7–23)
CALCIUM SERPL-MCNC: 9.4 MG/DL — SIGNIFICANT CHANGE UP (ref 8.4–10.5)
CHLORIDE SERPL-SCNC: 97 MMOL/L — SIGNIFICANT CHANGE UP (ref 96–108)
CO2 SERPL-SCNC: 22 MMOL/L — SIGNIFICANT CHANGE UP (ref 22–31)
CREAT SERPL-MCNC: 0.91 MG/DL — SIGNIFICANT CHANGE UP (ref 0.5–1.3)
GLUCOSE SERPL-MCNC: 95 MG/DL — SIGNIFICANT CHANGE UP (ref 70–99)
METHOD TYPE: SIGNIFICANT CHANGE UP
POTASSIUM SERPL-MCNC: 3.5 MMOL/L — SIGNIFICANT CHANGE UP (ref 3.5–5.3)
POTASSIUM SERPL-SCNC: 3.5 MMOL/L — SIGNIFICANT CHANGE UP (ref 3.5–5.3)
SODIUM SERPL-SCNC: 135 MMOL/L — SIGNIFICANT CHANGE UP (ref 135–145)
VANCOMYCIN TROUGH SERPL-MCNC: 13.3 UG/ML — SIGNIFICANT CHANGE UP (ref 10–20)

## 2017-06-06 PROCEDURE — 87070 CULTURE OTHR SPECIMN AEROBIC: CPT

## 2017-06-06 PROCEDURE — 99285 EMERGENCY DEPT VISIT HI MDM: CPT | Mod: 25

## 2017-06-06 PROCEDURE — 97161 PT EVAL LOW COMPLEX 20 MIN: CPT

## 2017-06-06 PROCEDURE — 73110 X-RAY EXAM OF WRIST: CPT

## 2017-06-06 PROCEDURE — 86901 BLOOD TYPING SEROLOGIC RH(D): CPT

## 2017-06-06 PROCEDURE — 99232 SBSQ HOSP IP/OBS MODERATE 35: CPT

## 2017-06-06 PROCEDURE — 85652 RBC SED RATE AUTOMATED: CPT

## 2017-06-06 PROCEDURE — 76000 FLUOROSCOPY <1 HR PHYS/QHP: CPT

## 2017-06-06 PROCEDURE — P9016: CPT

## 2017-06-06 PROCEDURE — G0463: CPT

## 2017-06-06 PROCEDURE — 87186 SC STD MICRODIL/AGAR DIL: CPT

## 2017-06-06 PROCEDURE — 36430 TRANSFUSION BLD/BLD COMPNT: CPT

## 2017-06-06 PROCEDURE — 71045 X-RAY EXAM CHEST 1 VIEW: CPT

## 2017-06-06 PROCEDURE — 85730 THROMBOPLASTIN TIME PARTIAL: CPT

## 2017-06-06 PROCEDURE — 87102 FUNGUS ISOLATION CULTURE: CPT

## 2017-06-06 PROCEDURE — 86850 RBC ANTIBODY SCREEN: CPT

## 2017-06-06 PROCEDURE — 85027 COMPLETE CBC AUTOMATED: CPT

## 2017-06-06 PROCEDURE — 80048 BASIC METABOLIC PNL TOTAL CA: CPT

## 2017-06-06 PROCEDURE — 80053 COMPREHEN METABOLIC PANEL: CPT

## 2017-06-06 PROCEDURE — 88300 SURGICAL PATH GROSS: CPT

## 2017-06-06 PROCEDURE — 80202 ASSAY OF VANCOMYCIN: CPT

## 2017-06-06 PROCEDURE — 73090 X-RAY EXAM OF FOREARM: CPT

## 2017-06-06 PROCEDURE — 85610 PROTHROMBIN TIME: CPT

## 2017-06-06 PROCEDURE — 86900 BLOOD TYPING SEROLOGIC ABO: CPT

## 2017-06-06 PROCEDURE — C1713: CPT

## 2017-06-06 PROCEDURE — 73201 CT UPPER EXTREMITY W/DYE: CPT

## 2017-06-06 PROCEDURE — 86923 COMPATIBILITY TEST ELECTRIC: CPT

## 2017-06-06 PROCEDURE — 86140 C-REACTIVE PROTEIN: CPT

## 2017-06-06 RX ORDER — ACETAMINOPHEN 500 MG
2 TABLET ORAL
Qty: 0 | Refills: 0 | COMMUNITY
Start: 2017-06-06

## 2017-06-06 RX ADMIN — Medication 100 MILLIGRAM(S): at 18:13

## 2017-06-06 RX ADMIN — Medication 100 MILLIGRAM(S): at 05:58

## 2017-06-06 RX ADMIN — Medication 81 MILLIGRAM(S): at 12:20

## 2017-06-06 RX ADMIN — Medication 100 MICROGRAM(S): at 05:58

## 2017-06-06 RX ADMIN — ESCITALOPRAM OXALATE 10 MILLIGRAM(S): 10 TABLET, FILM COATED ORAL at 12:20

## 2017-06-06 RX ADMIN — LEVETIRACETAM 125 MILLIGRAM(S): 250 TABLET, FILM COATED ORAL at 18:15

## 2017-06-06 RX ADMIN — Medication 250 MILLIGRAM(S): at 07:09

## 2017-06-06 RX ADMIN — LEVETIRACETAM 125 MILLIGRAM(S): 250 TABLET, FILM COATED ORAL at 05:58

## 2017-06-06 NOTE — DISCHARGE NOTE ADULT - MEDICATION SUMMARY - MEDICATIONS TO CHANGE
I will SWITCH the dose or number of times a day I take the medications listed below when I get home from the hospital:    Tylenol 500 mg oral tablet  -- 2 tab(s) by mouth , As Needed

## 2017-06-06 NOTE — DISCHARGE NOTE ADULT - NS AS ACTIVITY OBS
No Heavy lifting/straining/Walking-Indoors allowed/Do not make important decisions/Walking-Outdoors allowed/maintain non-weight bearing right upper extremity/Do not drive or operate machinery

## 2017-06-06 NOTE — DISCHARGE NOTE ADULT - PLAN OF CARE
surgical intervention should result in improved function Keep surgical incision, splint clean and dry, maintain non-weight bearing right upper extremity, continue regular diet, follow up with Dr Cornejo in 8-10 days for wound check and suture removal Keep surgical incision, splint clean and dry, maintain non-weight bearing right upper extremity, continue regular diet, follow up with Dr Cornejo in 8-10 days for wound check and suture removal; call for an appointment

## 2017-06-06 NOTE — DISCHARGE NOTE ADULT - MEDICATION SUMMARY - MEDICATIONS TO TAKE
I will START or STAY ON the medications listed below when I get home from the hospital:    Boost supplement one can every afternoon  --     -- Indication: For Vitamin    aspirin 81 mg oral delayed release tablet  -- 1 tab(s) by mouth once a day  -- Indication: For Antiplatelet    acetaminophen 325 mg oral tablet  -- 2 tab(s) by mouth every 6 hours as needed for pain; DO NOT EXCEED more than 4,000mg in 24hrs  -- Indication: For Pain    Keppra  -- 125 milligram(s) by mouth 2 times a day  -- Indication: For anticonvulsant    Lexapro 10 mg oral tablet  -- 1 tab(s) by mouth once a day (at bedtime)  -- Indication: For antidepressant    doxycycline monohydrate 100 mg oral capsule  -- 1 cap by mouth every 12 hours  -- Indication: For Antibiotic    docusate sodium 100 mg oral capsule  -- 1 cap(s) by mouth 2 times a day  -- Indication: For laxative    levothyroxine 100 mcg (0.1 mg) oral tablet  -- 1 tab(s) by mouth once a day  -- Indication: For Hypothyroid

## 2017-06-06 NOTE — DISCHARGE NOTE ADULT - NS AS DC STROKE ED MATERIALS
Call 911 for Stroke/Stroke Education Booklet/Risk Factors for Stroke/Need for Followup After Discharge/Prescribed Medications/Stroke Warning Signs and Symptoms

## 2017-06-06 NOTE — DISCHARGE NOTE ADULT - CARE PROVIDERS DIRECT ADDRESSES
,[email or web address],[email or web address],DirectAddress_Unknown

## 2017-06-06 NOTE — DISCHARGE NOTE ADULT - HOSPITAL COURSE
Chief Complaint/Reason for Admission	"they are operating on my right wrist and removing hardware"	    History of Present Illness:  History of Present Illness		  This is a 61 y/o female who presents with significant h/o left lung cancer diagnosed in . Initially treated with chemo and RT followed by surgery, currently on chemo therapy last chemo was 2017. Then in 2015, diagnosed with metastatic cancer of right wrist. Treated with Gamma knife followed by RT. In 2015, diagnosed with metastatic cancer of the brain. Treated with surgery followed with gamma knife and immune therapy. Presents with right wrist tumor. Subsequent xray and PET scan confirmed pathology. s/p exploration resection lesion right wrist on .Pt complains of wrist swelling, pain, decreased movement on hand, increasing numbness s/p recent ER  visit and  F/U with Ortho ,Now coming in PST for scheduled  Exploration/removal of Hardware Right Distal radius With Internal Fixation on 2017.  This is a 61 y/o female who presents with significant h/o left lung cancer diagnosed in . Initially treated with chemo and RT followed by surgery, currently on chemo therapy last chemo was 2017. Then in 2015, diagnosed with metastatic cancer of right wrist. Treated with Gamma knife followed by RT. In 2015, diagnosed with metastatic cancer of the brain. Treated with surgery followed with gamma knife and immune therapy. Presents with right wrist tumor. Subsequent xray and PET scan confirmed pathology. s/p exploration resection lesion right wrist on .pt complains of wrist swelling, pain F/U with Ortho ,Now coming in PST for scheduled  Exploration/removal of Hardware Right Distal radius With Internal Fixation on 2017.    Allergies/Medications:   Allergies:        Allergies:  	Zosyn: Drug, Suspected, Urticaria, Rash, Hives  	penicillin: Drug, Unknown    Home Medications:   * Patient Currently Takes Medications as of 2017 13:21 documented in Prescription Writer  · 	aspirin 81 mg oral delayed release tablet: Last Dose Taken:  , 1 tab(s) orally once a day  · 	docusate sodium 100 mg oral capsule: Last Dose Taken:  , 1 cap(s) orally 2 times a day  · 	Compazine: Last Dose Taken:  , 10 milligram(s) orally every 12 hours, As Needed  · 	Lexapro 10 mg oral tablet: 1 tab(s) orally once a day (at bedtime)  · 	Tylenol 500 mg oral tablet: Last Dose Taken:  , 2 tab(s) orally , As Needed  · 	Boost supplement one can every afternoon: Last Dose Taken:  ,     · 	Keppra: Last Dose Taken:  , 125 milligram(s) orally 2 times a day  · 	levothyroxine 100 mcg (0.1 mg) oral tablet: Last Dose Taken:  , 1 tab(s) orally once a day    PMH/PSH/FH/SH:   Past Medical History:  Anxiety    Brain tumor  s/p brain surgery in   Depression    Heart murmur  history F/u cardiac work up - asprin 81 mg for cardiac prophylaxis  History of seizure  occurred before brain surgery -- last seizure 2015  Hyperlipidemia    Hypertension    Hyponatremia    Hypothyroidism    Lung cancer  diagnosed in  --  mets to bone, brain s/p craniotomy for brain met resection, lung wedge resection and right forearm met resection. also s/p xrt  Currently on chemo last chemo was 2017 will completeed end of   Lung cancer, left  biopsy  chemo cisplatin/Alimta 2014  radiation   Malignant neoplasm of bone  Broken internal joint prothesis  Mass  right wrist mass in 2016.    Past Surgical History:      Cancer  Sfdss-k-ifko insertion  H/O brain surgery  7/9/15 - removal of brain tumor--post op received gamma knife followed by RT  History of lung biopsy  left   2014 EBUS  positive lung cancer  History of lung surgery  left wedge 1/15 --- diagnosed with mets to brain and right wrist  S/P wrist surgery  x 2 in prior secondary to metastasis.    Hospital Course: Chief Complaint/Reason for Admission	"they are operating on my right wrist and removing hardware"	    History of Present Illness:  History of Present Illness		  This is a 63 y/o female who presents with significant h/o left lung cancer diagnosed in . Initially treated with chemo and RT followed by surgery, currently on chemo therapy last chemo was 2017. Then in 2015, diagnosed with metastatic cancer of right wrist. Treated with Gamma knife followed by RT. In 2015, diagnosed with metastatic cancer of the brain. Treated with surgery followed with gamma knife and immune therapy. Presents with right wrist tumor. Subsequent xray and PET scan confirmed pathology. s/p exploration resection lesion right wrist on .Pt complains of wrist swelling, pain, decreased movement on hand, increasing numbness s/p recent ER  visit and  F/U with Ortho ,Now coming in PST for scheduled  Exploration/removal of Hardware Right Distal radius With Internal Fixation on 2017.  This is a 63 y/o female who presents with significant h/o left lung cancer diagnosed in . Initially treated with chemo and RT followed by surgery, currently on chemo therapy last chemo was 2017. Then in 2015, diagnosed with metastatic cancer of right wrist. Treated with Gamma knife followed by RT. In 2015, diagnosed with metastatic cancer of the brain. Treated with surgery followed with gamma knife and immune therapy. Presents with right wrist tumor. Subsequent xray and PET scan confirmed pathology. s/p exploration resection lesion right wrist on .pt complains of wrist swelling, pain F/U with Ortho ,Now coming in PST for scheduled  Exploration/removal of Hardware Right Distal radius With Internal Fixation on 2017.    Allergies/Medications:   Allergies:        Allergies:  	Zosyn: Drug, Suspected, Urticaria, Rash, Hives  	penicillin: Drug, Unknown    Home Medications:   * Patient Currently Takes Medications as of 2017 13:21 documented in Prescription Writer  · 	aspirin 81 mg oral delayed release tablet: Last Dose Taken:  , 1 tab(s) orally once a day  · 	docusate sodium 100 mg oral capsule: Last Dose Taken:  , 1 cap(s) orally 2 times a day  · 	Compazine: Last Dose Taken:  , 10 milligram(s) orally every 12 hours, As Needed  · 	Lexapro 10 mg oral tablet: 1 tab(s) orally once a day (at bedtime)  · 	Tylenol 500 mg oral tablet: Last Dose Taken:  , 2 tab(s) orally , As Needed  · 	Boost supplement one can every afternoon: Last Dose Taken:  ,     · 	Keppra: Last Dose Taken:  , 125 milligram(s) orally 2 times a day  · 	levothyroxine 100 mcg (0.1 mg) oral tablet: Last Dose Taken:  , 1 tab(s) orally once a day    PMH/PSH/FH/SH:   Past Medical History:  Anxiety    Brain tumor  s/p brain surgery in   Depression    Heart murmur  history F/u cardiac work up - asprin 81 mg for cardiac prophylaxis  History of seizure  occurred before brain surgery -- last seizure 2015  Hyperlipidemia    Hypertension    Hyponatremia    Hypothyroidism    Lung cancer  diagnosed in  --  mets to bone, brain s/p craniotomy for brain met resection, lung wedge resection and right forearm met resection. also s/p xrt  Currently on chemo last chemo was 2017 will completeed end of   Lung cancer, left  biopsy  chemo cisplatin/Alimta 2014  radiation   Malignant neoplasm of bone  Broken internal joint prothesis  Mass  right wrist mass in 2016.    Past Surgical History:      Cancer  Mnjmw-x-vnao insertion  H/O brain surgery  7/9/15 - removal of brain tumor--post op received gamma knife followed by RT  History of lung biopsy  left   2014 EBUS  positive lung cancer  History of lung surgery  left wedge 1/15 --- diagnosed with mets to brain and right wrist  S/P wrist surgery  x 2 in prior secondary to metastasis.    Hospital Course:  63 y/o fm underwent exploration, removal of broken hardware of right distal radius, with open reduction internal fixation of distal radius on 17 with Dr TIFFANIE Cornejo. Patient tolerated procedure well. Patient was evaluated postoperatively by physical therapist for non-weight bearing right upper extremity, and cleared patient for discharge home. Patient instructed to keep surgical wound/dressing clean and dry, maintain non-weight bearing, and follow up with Dr. Cornejo post op day #14(17) for wound check and suture removal. Chief Complaint/Reason for Admission	"they are operating on my right wrist and removing hardware"	    History of Present Illness:  History of Present Illness		  This is a 63 y/o female who presents with significant h/o left lung cancer diagnosed in . Initially treated with chemo and RT followed by surgery, currently on chemo therapy last chemo was 2017. Then in 2015, diagnosed with metastatic cancer of right wrist. Treated with Gamma knife followed by RT. In 2015, diagnosed with metastatic cancer of the brain. Treated with surgery followed with gamma knife and immune therapy. Presents with right wrist tumor. Subsequent xray and PET scan confirmed pathology. s/p exploration resection lesion right wrist on .Pt complains of wrist swelling, pain, decreased movement on hand, increasing numbness s/p recent ER  visit and  F/U with Ortho ,Now coming in PST for scheduled  Exploration/removal of Hardware Right Distal radius With Internal Fixation on 2017.  This is a 63 y/o female who presents with significant h/o left lung cancer diagnosed in . Initially treated with chemo and RT followed by surgery, currently on chemo therapy last chemo was 2017. Then in 2015, diagnosed with metastatic cancer of right wrist. Treated with Gamma knife followed by RT. In 2015, diagnosed with metastatic cancer of the brain. Treated with surgery followed with gamma knife and immune therapy. Presents with right wrist tumor. Subsequent xray and PET scan confirmed pathology. s/p exploration resection lesion right wrist on .pt complains of wrist swelling, pain F/U with Ortho ,Now coming in PST for scheduled  Exploration/removal of Hardware Right Distal radius With Internal Fixation on 2017.    Allergies/Medications:   Allergies:        Allergies:  	Zosyn: Drug, Suspected, Urticaria, Rash, Hives  	penicillin: Drug, Unknown    Home Medications:   * Patient Currently Takes Medications as of 2017 13:21 documented in Prescription Writer  · 	aspirin 81 mg oral delayed release tablet: Last Dose Taken:  , 1 tab(s) orally once a day  · 	docusate sodium 100 mg oral capsule: Last Dose Taken:  , 1 cap(s) orally 2 times a day  · 	Compazine: Last Dose Taken:  , 10 milligram(s) orally every 12 hours, As Needed  · 	Lexapro 10 mg oral tablet: 1 tab(s) orally once a day (at bedtime)  · 	Tylenol 500 mg oral tablet: Last Dose Taken:  , 2 tab(s) orally , As Needed  · 	Boost supplement one can every afternoon: Last Dose Taken:  ,     · 	Keppra: Last Dose Taken:  , 125 milligram(s) orally 2 times a day  · 	levothyroxine 100 mcg (0.1 mg) oral tablet: Last Dose Taken:  , 1 tab(s) orally once a day    PMH/PSH/FH/SH:   Past Medical History:  Anxiety    Brain tumor  s/p brain surgery in   Depression    Heart murmur  history F/u cardiac work up - asprin 81 mg for cardiac prophylaxis  History of seizure  occurred before brain surgery -- last seizure 2015  Hyperlipidemia    Hypertension    Hyponatremia    Hypothyroidism    Lung cancer  diagnosed in  --  mets to bone, brain s/p craniotomy for brain met resection, lung wedge resection and right forearm met resection. also s/p xrt  Currently on chemo last chemo was 2017 will completeed end of   Lung cancer, left  biopsy  chemo cisplatin/Alimta 2014  radiation   Malignant neoplasm of bone  Broken internal joint prothesis  Mass  right wrist mass in 2016.    Past Surgical History:      Cancer  Janbv-o-xbiz insertion  H/O brain surgery  7/9/15 - removal of brain tumor--post op received gamma knife followed by RT  History of lung biopsy  left   2014 EBUS  positive lung cancer  History of lung surgery  left wedge 1/15 --- diagnosed with mets to brain and right wrist  S/P wrist surgery  x 2 in prior secondary to metastasis.    Hospital Course:  63 y/o fm underwent exploration, removal of broken hardware of right distal radius, with open reduction internal fixation of distal radius on 17 with Dr TIFFANIE Cornejo. Patient tolerated procedure well. Patient was evaluated postoperatively by physical therapist for non-weight bearing right upper extremity, and cleared patient for discharge home. Patient instructed to keep surgical wound/dressing clean and dry, maintain non-weight bearing, and follow up with Dr. Cornejo post op day #14(17) for wound check and suture removal.  17 IV Vancomycin changed to PO Doxycycline per ID for 10 days.

## 2017-06-06 NOTE — DISCHARGE NOTE ADULT - CARE PLAN
Principal Discharge DX:	Malignant neoplasm of bone  Goal:	surgical intervention should result in improved function  Instructions for follow-up, activity and diet:	Keep surgical incision, splint clean and dry, maintain non-weight bearing right upper extremity, continue regular diet, follow up with Dr Cornejo in 8-10 days for wound check and suture removal Principal Discharge DX:	Malignant neoplasm of bone  Goal:	surgical intervention should result in improved function  Instructions for follow-up, activity and diet:	Keep surgical incision, splint clean and dry, maintain non-weight bearing right upper extremity, continue regular diet, follow up with Dr Cornejo in 8-10 days for wound check and suture removal; call for an appointment

## 2017-06-06 NOTE — DISCHARGE NOTE ADULT - PATIENT PORTAL LINK FT
“You can access the Follow[employer] Patient Portal, offered by [facility], by registering with the following website: [email or web address]”

## 2017-06-06 NOTE — DISCHARGE NOTE ADULT - CARE PROVIDER_API CALL
Harish Cornejo), Orthopedics  [address] [id number]  Phone: [phone]  Fax: [phone] Harish Cornejo), Orthopedics  [address] [place] [address]  Phone: [phone]  Fax: [phone]    Konrad Alfred (), Infectious Disease  [address] [place] [address]  Phone: [phone]  Fax: [phone]    Edgardo Dowd), Infectious Disease  [address] [place] [address]  Phone: [phone]  Fax: [phone]

## 2017-06-06 NOTE — DISCHARGE NOTE ADULT - ADDITIONAL INSTRUCTIONS
Keep incision, dressing, splint clean and dry, maintain non-weight bearing of right upper extremity. Follow up with Dr Cornejo in 8-10 days for wound check and suture removal. Keep incision, dressing, splint clean and dry, maintain non-weight bearing of right upper extremity. Follow up with Dr Cornejo in 8-10 days for wound check and suture removal; call for an appointment.  Make a follow-up appointment with Dr Alfred, Dr Dowd or any Infectious Disease doctor of your choice regarding this hospital stay.  Call your primary doctor and Oncologist to schedule a follow-up appointment to discuss this hospitalization and any changes to your medication regimen.

## 2017-06-07 ENCOUNTER — APPOINTMENT (OUTPATIENT)
Dept: CARDIOLOGY | Facility: CLINIC | Age: 62
End: 2017-06-07

## 2017-06-07 VITALS
SYSTOLIC BLOOD PRESSURE: 118 MMHG | RESPIRATION RATE: 16 BRPM | TEMPERATURE: 98 F | DIASTOLIC BLOOD PRESSURE: 64 MMHG | HEART RATE: 92 BPM | OXYGEN SATURATION: 94 %

## 2017-06-07 LAB
CULTURE RESULTS: SIGNIFICANT CHANGE UP
CULTURE RESULTS: SIGNIFICANT CHANGE UP
ORGANISM # SPEC MICROSCOPIC CNT: SIGNIFICANT CHANGE UP
ORGANISM # SPEC MICROSCOPIC CNT: SIGNIFICANT CHANGE UP
SPECIMEN SOURCE: SIGNIFICANT CHANGE UP
SPECIMEN SOURCE: SIGNIFICANT CHANGE UP

## 2017-06-08 LAB — SURGICAL PATHOLOGY STUDY: SIGNIFICANT CHANGE UP

## 2017-06-13 ENCOUNTER — APPOINTMENT (OUTPATIENT)
Dept: ORTHOPEDIC SURGERY | Facility: CLINIC | Age: 62
End: 2017-06-13

## 2017-07-01 LAB
CULTURE RESULTS: SIGNIFICANT CHANGE UP
CULTURE RESULTS: SIGNIFICANT CHANGE UP
SPECIMEN SOURCE: SIGNIFICANT CHANGE UP
SPECIMEN SOURCE: SIGNIFICANT CHANGE UP

## 2017-08-01 ENCOUNTER — APPOINTMENT (OUTPATIENT)
Dept: ORTHOPEDIC SURGERY | Facility: CLINIC | Age: 62
End: 2017-08-01
Payer: MEDICARE

## 2017-08-01 PROCEDURE — 99024 POSTOP FOLLOW-UP VISIT: CPT

## 2017-08-01 PROCEDURE — 73100 X-RAY EXAM OF WRIST: CPT | Mod: RT

## 2017-08-08 ENCOUNTER — FORM ENCOUNTER (OUTPATIENT)
Age: 62
End: 2017-08-08

## 2017-08-09 ENCOUNTER — APPOINTMENT (OUTPATIENT)
Dept: NEUROSURGERY | Facility: CLINIC | Age: 62
End: 2017-08-09
Payer: MEDICARE

## 2017-08-09 ENCOUNTER — OUTPATIENT (OUTPATIENT)
Dept: OUTPATIENT SERVICES | Facility: HOSPITAL | Age: 62
LOS: 1 days | End: 2017-08-09
Payer: MEDICARE

## 2017-08-09 ENCOUNTER — APPOINTMENT (OUTPATIENT)
Dept: MRI IMAGING | Facility: IMAGING CENTER | Age: 62
End: 2017-08-09
Payer: MEDICARE

## 2017-08-09 DIAGNOSIS — Z98.890 OTHER SPECIFIED POSTPROCEDURAL STATES: Chronic | ICD-10-CM

## 2017-08-09 DIAGNOSIS — C79.31 SECONDARY MALIGNANT NEOPLASM OF BRAIN: ICD-10-CM

## 2017-08-09 DIAGNOSIS — Z98.89 OTHER SPECIFIED POSTPROCEDURAL STATES: Chronic | ICD-10-CM

## 2017-08-09 DIAGNOSIS — C80.1 MALIGNANT (PRIMARY) NEOPLASM, UNSPECIFIED: Chronic | ICD-10-CM

## 2017-08-09 PROCEDURE — 70553 MRI BRAIN STEM W/O & W/DYE: CPT

## 2017-08-09 PROCEDURE — 82565 ASSAY OF CREATININE: CPT

## 2017-08-09 PROCEDURE — A9585: CPT

## 2017-08-09 PROCEDURE — 70553 MRI BRAIN STEM W/O & W/DYE: CPT | Mod: 26

## 2017-08-09 PROCEDURE — 99215 OFFICE O/P EST HI 40 MIN: CPT

## 2017-08-24 ENCOUNTER — OUTPATIENT (OUTPATIENT)
Dept: OUTPATIENT SERVICES | Facility: HOSPITAL | Age: 62
LOS: 1 days | End: 2017-08-24
Payer: MEDICARE

## 2017-08-24 ENCOUNTER — APPOINTMENT (OUTPATIENT)
Dept: NUCLEAR MEDICINE | Facility: CLINIC | Age: 62
End: 2017-08-24

## 2017-08-24 DIAGNOSIS — Z98.890 OTHER SPECIFIED POSTPROCEDURAL STATES: Chronic | ICD-10-CM

## 2017-08-24 DIAGNOSIS — Z98.89 OTHER SPECIFIED POSTPROCEDURAL STATES: Chronic | ICD-10-CM

## 2017-08-24 DIAGNOSIS — C80.1 MALIGNANT (PRIMARY) NEOPLASM, UNSPECIFIED: Chronic | ICD-10-CM

## 2017-08-24 DIAGNOSIS — Z00.8 ENCOUNTER FOR OTHER GENERAL EXAMINATION: ICD-10-CM

## 2017-08-24 PROCEDURE — 78816 PET IMAGE W/CT FULL BODY: CPT | Mod: 26,KX,PS

## 2017-08-24 PROCEDURE — 78816 PET IMAGE W/CT FULL BODY: CPT

## 2017-08-24 PROCEDURE — A9552: CPT

## 2017-09-28 ENCOUNTER — RX RENEWAL (OUTPATIENT)
Age: 62
End: 2017-09-28

## 2017-11-03 ENCOUNTER — APPOINTMENT (OUTPATIENT)
Dept: ORTHOPEDIC SURGERY | Facility: CLINIC | Age: 62
End: 2017-11-03
Payer: MEDICARE

## 2017-11-03 PROCEDURE — 73090 X-RAY EXAM OF FOREARM: CPT

## 2017-11-03 PROCEDURE — 99213 OFFICE O/P EST LOW 20 MIN: CPT

## 2017-11-09 NOTE — ED ADULT NURSE NOTE - CCCP TRG CHIEF CMPLNT
Pt refused to use breathalyzer after many attempts of explaining technique and hospital protocol to pt  Pt states " Then just let me leave  Let them take me to [facility]  You are not going to tell me what to do" Again attempt made to explain to pt that this is the hospital protocol and pt can not be seen by crisis until a BAT is documented        Yenifer Gunter RN  11/09/17 0976 redness, swelling, pain to right wrist

## 2017-11-28 ENCOUNTER — APPOINTMENT (OUTPATIENT)
Dept: NUCLEAR MEDICINE | Facility: CLINIC | Age: 62
End: 2017-11-28
Payer: MEDICARE

## 2017-11-28 ENCOUNTER — OUTPATIENT (OUTPATIENT)
Dept: OUTPATIENT SERVICES | Facility: HOSPITAL | Age: 62
LOS: 1 days | End: 2017-11-28
Payer: MEDICARE

## 2017-11-28 DIAGNOSIS — Z98.890 OTHER SPECIFIED POSTPROCEDURAL STATES: Chronic | ICD-10-CM

## 2017-11-28 DIAGNOSIS — C80.1 MALIGNANT (PRIMARY) NEOPLASM, UNSPECIFIED: Chronic | ICD-10-CM

## 2017-11-28 DIAGNOSIS — Z98.89 OTHER SPECIFIED POSTPROCEDURAL STATES: Chronic | ICD-10-CM

## 2017-11-28 DIAGNOSIS — Z00.8 ENCOUNTER FOR OTHER GENERAL EXAMINATION: ICD-10-CM

## 2017-11-28 PROCEDURE — 78816 PET IMAGE W/CT FULL BODY: CPT | Mod: 26,KX,PS

## 2017-11-28 PROCEDURE — A9552: CPT

## 2017-11-28 PROCEDURE — 78816 PET IMAGE W/CT FULL BODY: CPT

## 2017-12-12 ENCOUNTER — FORM ENCOUNTER (OUTPATIENT)
Age: 62
End: 2017-12-12

## 2017-12-13 ENCOUNTER — APPOINTMENT (OUTPATIENT)
Dept: MRI IMAGING | Facility: IMAGING CENTER | Age: 62
End: 2017-12-13
Payer: MEDICARE

## 2017-12-13 ENCOUNTER — OUTPATIENT (OUTPATIENT)
Dept: OUTPATIENT SERVICES | Facility: HOSPITAL | Age: 62
LOS: 1 days | End: 2017-12-13
Payer: MEDICARE

## 2017-12-13 ENCOUNTER — APPOINTMENT (OUTPATIENT)
Dept: NEUROSURGERY | Facility: CLINIC | Age: 62
End: 2017-12-13
Payer: MEDICARE

## 2017-12-13 VITALS
TEMPERATURE: 98.6 F | HEART RATE: 85 BPM | HEIGHT: 67 IN | SYSTOLIC BLOOD PRESSURE: 95 MMHG | WEIGHT: 110 LBS | DIASTOLIC BLOOD PRESSURE: 60 MMHG | BODY MASS INDEX: 17.27 KG/M2 | OXYGEN SATURATION: 97 %

## 2017-12-13 DIAGNOSIS — Z98.890 OTHER SPECIFIED POSTPROCEDURAL STATES: Chronic | ICD-10-CM

## 2017-12-13 DIAGNOSIS — C79.31 SECONDARY MALIGNANT NEOPLASM OF BRAIN: ICD-10-CM

## 2017-12-13 DIAGNOSIS — C80.1 MALIGNANT (PRIMARY) NEOPLASM, UNSPECIFIED: Chronic | ICD-10-CM

## 2017-12-13 DIAGNOSIS — Z98.89 OTHER SPECIFIED POSTPROCEDURAL STATES: Chronic | ICD-10-CM

## 2017-12-13 PROCEDURE — 99213 OFFICE O/P EST LOW 20 MIN: CPT

## 2017-12-13 PROCEDURE — A9585: CPT

## 2017-12-13 PROCEDURE — 70553 MRI BRAIN STEM W/O & W/DYE: CPT

## 2017-12-13 PROCEDURE — 82565 ASSAY OF CREATININE: CPT

## 2017-12-13 PROCEDURE — 70553 MRI BRAIN STEM W/O & W/DYE: CPT | Mod: 26

## 2018-01-12 ENCOUNTER — APPOINTMENT (OUTPATIENT)
Dept: CARDIOLOGY | Facility: CLINIC | Age: 63
End: 2018-01-12
Payer: MEDICARE

## 2018-01-12 ENCOUNTER — NON-APPOINTMENT (OUTPATIENT)
Age: 63
End: 2018-01-12

## 2018-01-12 VITALS
BODY MASS INDEX: 17.27 KG/M2 | HEIGHT: 67 IN | WEIGHT: 110 LBS | HEART RATE: 90 BPM | OXYGEN SATURATION: 95 % | SYSTOLIC BLOOD PRESSURE: 96 MMHG | DIASTOLIC BLOOD PRESSURE: 68 MMHG

## 2018-01-12 VITALS — SYSTOLIC BLOOD PRESSURE: 80 MMHG | DIASTOLIC BLOOD PRESSURE: 50 MMHG

## 2018-01-12 PROCEDURE — 93000 ELECTROCARDIOGRAM COMPLETE: CPT

## 2018-01-12 PROCEDURE — 99215 OFFICE O/P EST HI 40 MIN: CPT

## 2018-01-12 PROCEDURE — 93306 TTE W/DOPPLER COMPLETE: CPT

## 2018-01-24 ENCOUNTER — APPOINTMENT (OUTPATIENT)
Dept: CARDIOLOGY | Facility: CLINIC | Age: 63
End: 2018-01-24
Payer: MEDICARE

## 2018-01-24 VITALS
SYSTOLIC BLOOD PRESSURE: 89 MMHG | HEART RATE: 105 BPM | HEIGHT: 67 IN | OXYGEN SATURATION: 95 % | RESPIRATION RATE: 16 BRPM | DIASTOLIC BLOOD PRESSURE: 46 MMHG | WEIGHT: 110 LBS | BODY MASS INDEX: 17.27 KG/M2

## 2018-01-24 VITALS — SYSTOLIC BLOOD PRESSURE: 84 MMHG | DIASTOLIC BLOOD PRESSURE: 56 MMHG

## 2018-01-24 DIAGNOSIS — I10 ESSENTIAL (PRIMARY) HYPERTENSION: ICD-10-CM

## 2018-01-24 PROCEDURE — 99214 OFFICE O/P EST MOD 30 MIN: CPT

## 2018-01-29 ENCOUNTER — MEDICATION RENEWAL (OUTPATIENT)
Age: 63
End: 2018-01-29

## 2018-01-30 ENCOUNTER — MEDICATION RENEWAL (OUTPATIENT)
Age: 63
End: 2018-01-30

## 2018-02-06 ENCOUNTER — APPOINTMENT (OUTPATIENT)
Dept: ORTHOPEDIC SURGERY | Facility: CLINIC | Age: 63
End: 2018-02-06
Payer: MEDICARE

## 2018-02-06 VITALS — HEIGHT: 67 IN | WEIGHT: 110 LBS | BODY MASS INDEX: 17.27 KG/M2

## 2018-02-06 PROCEDURE — 99213 OFFICE O/P EST LOW 20 MIN: CPT

## 2018-02-06 PROCEDURE — 73090 X-RAY EXAM OF FOREARM: CPT | Mod: RT

## 2018-02-07 ENCOUNTER — APPOINTMENT (OUTPATIENT)
Dept: CARDIOLOGY | Facility: CLINIC | Age: 63
End: 2018-02-07
Payer: MEDICARE

## 2018-02-07 ENCOUNTER — NON-APPOINTMENT (OUTPATIENT)
Age: 63
End: 2018-02-07

## 2018-02-07 VITALS
WEIGHT: 106 LBS | HEART RATE: 85 BPM | SYSTOLIC BLOOD PRESSURE: 122 MMHG | RESPIRATION RATE: 16 BRPM | OXYGEN SATURATION: 96 % | DIASTOLIC BLOOD PRESSURE: 86 MMHG | HEIGHT: 67 IN | BODY MASS INDEX: 16.64 KG/M2

## 2018-02-07 PROCEDURE — 99214 OFFICE O/P EST MOD 30 MIN: CPT

## 2018-02-07 PROCEDURE — 93000 ELECTROCARDIOGRAM COMPLETE: CPT

## 2018-02-19 ENCOUNTER — OUTPATIENT (OUTPATIENT)
Dept: OUTPATIENT SERVICES | Facility: HOSPITAL | Age: 63
LOS: 1 days | End: 2018-02-19

## 2018-02-19 ENCOUNTER — APPOINTMENT (OUTPATIENT)
Dept: NUCLEAR MEDICINE | Facility: IMAGING CENTER | Age: 63
End: 2018-02-19
Payer: MEDICARE

## 2018-02-19 DIAGNOSIS — Z98.89 OTHER SPECIFIED POSTPROCEDURAL STATES: Chronic | ICD-10-CM

## 2018-02-19 DIAGNOSIS — C34.90 MALIGNANT NEOPLASM OF UNSPECIFIED PART OF UNSPECIFIED BRONCHUS OR LUNG: ICD-10-CM

## 2018-02-19 DIAGNOSIS — Z98.890 OTHER SPECIFIED POSTPROCEDURAL STATES: Chronic | ICD-10-CM

## 2018-02-19 DIAGNOSIS — C80.1 MALIGNANT (PRIMARY) NEOPLASM, UNSPECIFIED: Chronic | ICD-10-CM

## 2018-02-19 PROCEDURE — 78816 PET IMAGE W/CT FULL BODY: CPT | Mod: 26,PS

## 2018-02-22 ENCOUNTER — INPATIENT (INPATIENT)
Facility: HOSPITAL | Age: 63
LOS: 0 days | Discharge: HOME | DRG: 187 | End: 2018-02-23
Attending: GENERAL ACUTE CARE HOSPITAL | Admitting: GENERAL ACUTE CARE HOSPITAL
Payer: MEDICARE

## 2018-02-22 ENCOUNTER — RESULT REVIEW (OUTPATIENT)
Age: 63
End: 2018-02-22

## 2018-02-22 VITALS
WEIGHT: 104.06 LBS | DIASTOLIC BLOOD PRESSURE: 50 MMHG | RESPIRATION RATE: 20 BRPM | TEMPERATURE: 98 F | SYSTOLIC BLOOD PRESSURE: 73 MMHG | OXYGEN SATURATION: 95 % | HEART RATE: 94 BPM

## 2018-02-22 DIAGNOSIS — J90 PLEURAL EFFUSION, NOT ELSEWHERE CLASSIFIED: ICD-10-CM

## 2018-02-22 DIAGNOSIS — Z98.89 OTHER SPECIFIED POSTPROCEDURAL STATES: Chronic | ICD-10-CM

## 2018-02-22 DIAGNOSIS — C80.1 MALIGNANT (PRIMARY) NEOPLASM, UNSPECIFIED: Chronic | ICD-10-CM

## 2018-02-22 DIAGNOSIS — Z98.890 OTHER SPECIFIED POSTPROCEDURAL STATES: Chronic | ICD-10-CM

## 2018-02-22 LAB
ALBUMIN SERPL ELPH-MCNC: 3.3 G/DL — SIGNIFICANT CHANGE UP (ref 3.3–5)
ALP SERPL-CCNC: 71 U/L — SIGNIFICANT CHANGE UP (ref 40–120)
ALT FLD-CCNC: 12 U/L RC — SIGNIFICANT CHANGE UP (ref 10–45)
ANION GAP SERPL CALC-SCNC: 16 MMOL/L — SIGNIFICANT CHANGE UP (ref 5–17)
APTT BLD: 28.9 SEC — SIGNIFICANT CHANGE UP (ref 27.5–37.4)
AST SERPL-CCNC: 29 U/L — SIGNIFICANT CHANGE UP (ref 10–40)
B PERT IGG+IGM PNL SER: CLEAR — SIGNIFICANT CHANGE UP
BASE EXCESS BLDV CALC-SCNC: -1.2 MMOL/L — SIGNIFICANT CHANGE UP (ref -2–2)
BASOPHILS # BLD AUTO: 0 K/UL — SIGNIFICANT CHANGE UP (ref 0–0.2)
BASOPHILS NFR BLD AUTO: 0.5 % — SIGNIFICANT CHANGE UP (ref 0–2)
BILIRUB SERPL-MCNC: 0.3 MG/DL — SIGNIFICANT CHANGE UP (ref 0.2–1.2)
BLD GP AB SCN SERPL QL: NEGATIVE — SIGNIFICANT CHANGE UP
BUN SERPL-MCNC: 17 MG/DL — SIGNIFICANT CHANGE UP (ref 7–23)
CA-I SERPL-SCNC: 1.24 MMOL/L — SIGNIFICANT CHANGE UP (ref 1.12–1.3)
CALCIUM SERPL-MCNC: 10.1 MG/DL — SIGNIFICANT CHANGE UP (ref 8.4–10.5)
CHLORIDE BLDV-SCNC: 107 MMOL/L — SIGNIFICANT CHANGE UP (ref 96–108)
CHLORIDE SERPL-SCNC: 101 MMOL/L — SIGNIFICANT CHANGE UP (ref 96–108)
CO2 BLDV-SCNC: 23 MMOL/L — SIGNIFICANT CHANGE UP (ref 22–30)
CO2 SERPL-SCNC: 20 MMOL/L — LOW (ref 22–31)
COLOR FLD: YELLOW — SIGNIFICANT CHANGE UP
CREAT SERPL-MCNC: 0.81 MG/DL — SIGNIFICANT CHANGE UP (ref 0.5–1.3)
EOSINOPHIL # BLD AUTO: 0 K/UL — SIGNIFICANT CHANGE UP (ref 0–0.5)
EOSINOPHIL NFR BLD AUTO: 0.1 % — SIGNIFICANT CHANGE UP (ref 0–6)
FLUID INTAKE SUBSTANCE CLASS: SIGNIFICANT CHANGE UP
FLUID SEGMENTED GRANULOCYTES: 3 % — SIGNIFICANT CHANGE UP
GAS PNL BLDV: 135 MMOL/L — LOW (ref 136–145)
GAS PNL BLDV: SIGNIFICANT CHANGE UP
GAS PNL BLDV: SIGNIFICANT CHANGE UP
GLUCOSE BLDV-MCNC: 156 MG/DL — HIGH (ref 70–99)
GLUCOSE FLD-MCNC: 106 MG/DL — SIGNIFICANT CHANGE UP
GLUCOSE SERPL-MCNC: 89 MG/DL — SIGNIFICANT CHANGE UP (ref 70–99)
HCO3 BLDV-SCNC: 22 MMOL/L — SIGNIFICANT CHANGE UP (ref 21–29)
HCT VFR BLD CALC: 39.1 % — SIGNIFICANT CHANGE UP (ref 34.5–45)
HCT VFR BLDA CALC: 37 % — LOW (ref 39–50)
HGB BLD CALC-MCNC: 12.1 G/DL — SIGNIFICANT CHANGE UP (ref 11.5–15.5)
HGB BLD-MCNC: 13.2 G/DL — SIGNIFICANT CHANGE UP (ref 11.5–15.5)
HOROWITZ INDEX BLDV+IHG-RTO: SIGNIFICANT CHANGE UP
INR BLD: 1.02 RATIO — SIGNIFICANT CHANGE UP (ref 0.88–1.16)
LACTATE BLDV-MCNC: 2.4 MMOL/L — HIGH (ref 0.7–2)
LDH SERPL L TO P-CCNC: 136 U/L — SIGNIFICANT CHANGE UP
LYMPHOCYTES # BLD AUTO: 1.1 K/UL — SIGNIFICANT CHANGE UP (ref 1–3.3)
LYMPHOCYTES # BLD AUTO: 16.6 % — SIGNIFICANT CHANGE UP (ref 13–44)
LYMPHOCYTES # FLD: 47 % — SIGNIFICANT CHANGE UP
MCHC RBC-ENTMCNC: 31.5 PG — SIGNIFICANT CHANGE UP (ref 27–34)
MCHC RBC-ENTMCNC: 33.8 GM/DL — SIGNIFICANT CHANGE UP (ref 32–36)
MCV RBC AUTO: 93.2 FL — SIGNIFICANT CHANGE UP (ref 80–100)
MESOTHL CELL # FLD: 32 % — SIGNIFICANT CHANGE UP
MONOCYTES # BLD AUTO: 0.6 K/UL — SIGNIFICANT CHANGE UP (ref 0–0.9)
MONOCYTES NFR BLD AUTO: 8.4 % — SIGNIFICANT CHANGE UP (ref 2–14)
MONOS+MACROS # FLD: 15 % — SIGNIFICANT CHANGE UP
NEUTROPHILS # BLD AUTO: 5 K/UL — SIGNIFICANT CHANGE UP (ref 1.8–7.4)
NEUTROPHILS NFR BLD AUTO: 74.5 % — SIGNIFICANT CHANGE UP (ref 43–77)
OTHER CELLS FLD MANUAL: 1 % — SIGNIFICANT CHANGE UP
PCO2 BLDV: 34 MMHG — LOW (ref 35–50)
PH BLDV: 7.42 — SIGNIFICANT CHANGE UP (ref 7.35–7.45)
PH FLD: 7.59 — SIGNIFICANT CHANGE UP
PLATELET # BLD AUTO: 480 K/UL — HIGH (ref 150–400)
PO2 BLDV: 90 MMHG — HIGH (ref 25–45)
POTASSIUM BLDV-SCNC: 3.5 MMOL/L — SIGNIFICANT CHANGE UP (ref 3.5–5)
POTASSIUM SERPL-MCNC: 4 MMOL/L — SIGNIFICANT CHANGE UP (ref 3.5–5.3)
POTASSIUM SERPL-SCNC: 4 MMOL/L — SIGNIFICANT CHANGE UP (ref 3.5–5.3)
PROT FLD-MCNC: 4.6 G/DL — SIGNIFICANT CHANGE UP
PROT SERPL-MCNC: 7.1 G/DL — SIGNIFICANT CHANGE UP (ref 6–8.3)
PROTHROM AB SERPL-ACNC: 11.1 SEC — SIGNIFICANT CHANGE UP (ref 9.8–12.7)
RAPID RVP RESULT: SIGNIFICANT CHANGE UP
RBC # BLD: 4.19 M/UL — SIGNIFICANT CHANGE UP (ref 3.8–5.2)
RBC # FLD: 15.9 % — HIGH (ref 10.3–14.5)
RCV VOL RI: 147 /UL — HIGH (ref 0–5)
RH IG SCN BLD-IMP: POSITIVE — SIGNIFICANT CHANGE UP
SAO2 % BLDV: 97 % — HIGH (ref 67–88)
SODIUM SERPL-SCNC: 137 MMOL/L — SIGNIFICANT CHANGE UP (ref 135–145)
SPECIMEN SOURCE FLD: SIGNIFICANT CHANGE UP
TOTAL NUCLEATED CELL COUNT, BODY FLUID: 343 /UL — HIGH (ref 0–5)
TUBE TYPE: SIGNIFICANT CHANGE UP
WBC # BLD: 6.7 K/UL — SIGNIFICANT CHANGE UP (ref 3.8–10.5)
WBC # FLD AUTO: 6.7 K/UL — SIGNIFICANT CHANGE UP (ref 3.8–10.5)

## 2018-02-22 PROCEDURE — 88342 IMHCHEM/IMCYTCHM 1ST ANTB: CPT | Mod: 26

## 2018-02-22 PROCEDURE — 93010 ELECTROCARDIOGRAM REPORT: CPT

## 2018-02-22 PROCEDURE — 71045 X-RAY EXAM CHEST 1 VIEW: CPT | Mod: 26

## 2018-02-22 PROCEDURE — 88112 CYTOPATH CELL ENHANCE TECH: CPT | Mod: 26

## 2018-02-22 PROCEDURE — 93308 TTE F-UP OR LMTD: CPT | Mod: 26

## 2018-02-22 PROCEDURE — 88305 TISSUE EXAM BY PATHOLOGIST: CPT | Mod: 26

## 2018-02-22 PROCEDURE — 88341 IMHCHEM/IMCYTCHM EA ADD ANTB: CPT | Mod: 26

## 2018-02-22 PROCEDURE — 88108 CYTOPATH CONCENTRATE TECH: CPT | Mod: 26,59

## 2018-02-22 PROCEDURE — 85060 BLOOD SMEAR INTERPRETATION: CPT

## 2018-02-22 PROCEDURE — 99285 EMERGENCY DEPT VISIT HI MDM: CPT | Mod: 25

## 2018-02-22 RX ORDER — SODIUM CHLORIDE 9 MG/ML
1000 INJECTION INTRAMUSCULAR; INTRAVENOUS; SUBCUTANEOUS ONCE
Qty: 0 | Refills: 0 | Status: DISCONTINUED | OUTPATIENT
Start: 2018-02-22 | End: 2018-02-22

## 2018-02-22 RX ORDER — DIPHENHYDRAMINE HCL 50 MG
25 CAPSULE ORAL AT BEDTIME
Qty: 0 | Refills: 0 | Status: DISCONTINUED | OUTPATIENT
Start: 2018-02-22 | End: 2018-02-23

## 2018-02-22 RX ORDER — CALCIUM CARBONATE 500(1250)
1 TABLET ORAL DAILY
Qty: 0 | Refills: 0 | Status: DISCONTINUED | OUTPATIENT
Start: 2018-02-22 | End: 2018-02-23

## 2018-02-22 RX ORDER — LEVETIRACETAM 250 MG/1
125 TABLET, FILM COATED ORAL
Qty: 60 | Refills: 0 | COMMUNITY

## 2018-02-22 RX ORDER — ESCITALOPRAM OXALATE 10 MG/1
5 TABLET, FILM COATED ORAL DAILY
Qty: 0 | Refills: 0 | Status: DISCONTINUED | OUTPATIENT
Start: 2018-02-22 | End: 2018-02-23

## 2018-02-22 RX ORDER — LEVETIRACETAM 250 MG/1
250 TABLET, FILM COATED ORAL
Qty: 0 | Refills: 0 | Status: DISCONTINUED | OUTPATIENT
Start: 2018-02-22 | End: 2018-02-23

## 2018-02-22 RX ORDER — MIDODRINE HYDROCHLORIDE 2.5 MG/1
5 TABLET ORAL THREE TIMES A DAY
Qty: 0 | Refills: 0 | Status: DISCONTINUED | OUTPATIENT
Start: 2018-02-22 | End: 2018-02-23

## 2018-02-22 RX ORDER — ASPIRIN/CALCIUM CARB/MAGNESIUM 324 MG
81 TABLET ORAL DAILY
Qty: 0 | Refills: 0 | Status: DISCONTINUED | OUTPATIENT
Start: 2018-02-22 | End: 2018-02-23

## 2018-02-22 RX ORDER — PROCHLORPERAZINE MALEATE 5 MG
10 TABLET ORAL EVERY 6 HOURS
Qty: 0 | Refills: 0 | Status: DISCONTINUED | OUTPATIENT
Start: 2018-02-22 | End: 2018-02-23

## 2018-02-22 RX ORDER — LEVOTHYROXINE SODIUM 125 MCG
125 TABLET ORAL DAILY
Qty: 0 | Refills: 0 | Status: DISCONTINUED | OUTPATIENT
Start: 2018-02-22 | End: 2018-02-23

## 2018-02-22 RX ORDER — PANTOPRAZOLE SODIUM 20 MG/1
40 TABLET, DELAYED RELEASE ORAL
Qty: 0 | Refills: 0 | Status: DISCONTINUED | OUTPATIENT
Start: 2018-02-22 | End: 2018-02-23

## 2018-02-22 RX ORDER — DOCUSATE SODIUM 100 MG
100 CAPSULE ORAL THREE TIMES A DAY
Qty: 0 | Refills: 0 | Status: DISCONTINUED | OUTPATIENT
Start: 2018-02-22 | End: 2018-02-23

## 2018-02-22 RX ORDER — HEPARIN SODIUM 5000 [USP'U]/ML
5000 INJECTION INTRAVENOUS; SUBCUTANEOUS EVERY 8 HOURS
Qty: 0 | Refills: 0 | Status: DISCONTINUED | OUTPATIENT
Start: 2018-02-22 | End: 2018-02-23

## 2018-02-22 RX ORDER — SIMVASTATIN 20 MG/1
10 TABLET, FILM COATED ORAL AT BEDTIME
Qty: 0 | Refills: 0 | Status: DISCONTINUED | OUTPATIENT
Start: 2018-02-22 | End: 2018-02-23

## 2018-02-22 RX ORDER — LEVOTHYROXINE SODIUM 125 MCG
1 TABLET ORAL
Qty: 0 | Refills: 0 | COMMUNITY

## 2018-02-22 RX ADMIN — Medication 25 MILLIGRAM(S): at 22:32

## 2018-02-22 RX ADMIN — Medication 100 MILLIGRAM(S): at 22:32

## 2018-02-22 RX ADMIN — HEPARIN SODIUM 5000 UNIT(S): 5000 INJECTION INTRAVENOUS; SUBCUTANEOUS at 23:18

## 2018-02-22 RX ADMIN — Medication 50 MILLIGRAM(S): at 18:29

## 2018-02-22 RX ADMIN — MIDODRINE HYDROCHLORIDE 5 MILLIGRAM(S): 2.5 TABLET ORAL at 22:30

## 2018-02-22 NOTE — PROGRESS NOTE ADULT - SUBJECTIVE AND OBJECTIVE BOX
[facility]    PROCEDURE: THORACENTESIS    INDICATION: PLEURAL EFFUSION                    [x] DIAGNOSTIC                    [x] THERAPEUTIC                            [ ] r/o CHF                                    [x] r/o neoplasm                   [ ] r/o empyema                    [ ] r/o hemothorax      PRE-PROCEDURE  Informed consent was obtained after the risks and benefits of the procedure were explained. Risks described included but were not limited to bleeding, cough, pneumothorax (potentially requiring chest tube placement, vaso-vagal reactions, chest pain and death.    TIME OUT was performed prior to the procedure with verbal confirmation of correct patient identity, correct site, availability of necessary equipment, and accuracy of the consent form. Safety precautions based on the patient history and medication use have been addressed.    The patient was positioned in a sitting position at the edge of the bed, leaning forward with arms resting on a table. The posterior  [ ] left    [x] right     chest was prepped and draped with strict adherence to aseptic precautions after localization of the pleural fluid was performed using ultrasound guidance. Xylocaine 1% was injected for local anesthesia and then a thoracenesis catheter was advanced into the pleural space.     Approximately   1500 cc  of                [x] clear yellow          [ ] straw colored          [ ] serosanguinous          [ ] blood-tinged          [ ] bloody          [ ] purulent          [ ] chylous          [ ] cloudy     fluid was removed.    The patient tolerated the procedure and no immediate complications were observed. Post-procedure vital signs were stable.    [x] Specimens were sent for cytology, AFB smear and culure, fungal culture, routine culture, gram stain, cell count, pH and chemistry.    [ ] Specimens were not sent.    [x] A post-procedure CXR to r/o PTX has been ordered and will be checked.        Henrique Hanks MD, [facility]  [phone]

## 2018-02-22 NOTE — H&P ADULT - PSH
Cancer  Eumvc-j-denh insertion  H/O brain surgery  7/9/15 - removal of brain tumor--post op received gamma knife followed by RT  History of lung biopsy  left   2014 EBUS  positive lung cancer  History of lung surgery  left wedge 1/15 --- diagnosed with mets to brain and right wrist  S/P wrist surgery  x 2 in prior secondary to metastasis

## 2018-02-22 NOTE — ED ADULT NURSE NOTE - PMH
Anxiety    Brain tumor  s/p brain surgery in 2015  Depression    Heart murmur  history F/u cardiac work up - asprin 81 mg for cardiac prophylaxis  History of seizure  occurred before brain surgery -- last seizure July 2015  Hyperlipidemia    Hypertension    Hyponatremia    Hypothyroidism    Lung cancer  diagnosed in 2014 --  mets to bone, brain s/p craniotomy for brain met resection, lung wedge resection and right forearm met resection. also s/p xrt  Currently on chemo last chemo was 5/25/2017 will completeed end of June  Lung cancer, left  biopsy  chemo cisplatin/Alimta 11/2014  radiation 2014  Malignant neoplasm of bone  Broken internal joint prothesis  Mass  right wrist mass in November 2016

## 2018-02-22 NOTE — ED PROVIDER NOTE - MEDICAL DECISION MAKING DETAILS
63 year old female with mild symptomatic b/l pleural effusions without signs of tamponade. No advances of imagine CT needed given CT (PET scan was performed 3 days ago). Pulmonary consulted. Patient will be admitted to Dr. Melgar. Primary oncologist, Dr. Mendiola aware. Impression:  63 year old female with mild symptomatic b/l pleural effusions.  Bedside US does not suggest cardiac tamponade.  Patient has chronically low BP, and take midodrine 5mg tid for it.  No advanced imaging with CT needed given PET scan was performed 3 days ago. Pulmonary consulted.  Patient will be admitted to Dr. Melgar. Primary oncologist, Dr. Mendiola aware.

## 2018-02-22 NOTE — ED ADULT NURSE REASSESSMENT NOTE - NS ED NURSE REASSESS COMMENT FT1
pt returned from paracentesis Alert and orientedx3 Resp even and nonlab Denies chest pain no sob 02 sat 99%r/a pt returned from thoracentesis. Alert and orientedx3 Resp even and nonlab Denies chest pain no sob 02 sat 99%r/a

## 2018-02-22 NOTE — ED PROVIDER NOTE - OBJECTIVE STATEMENT
63 year old female sent to ED for SOB. PET scan performed 3 days ago and demonstrated b/l pleural effusions. Progressively worsening SOB over the last 3 weeks. No fever or chills. No nausea or vomiting. Intensity: patient cannot ambulate without being extremely short out of breathe. No O2 required. Worsen with exertion and speaking. Better with rest and oxygen.

## 2018-02-22 NOTE — CONSULT NOTE ADULT - ASSESSMENT
ASSESSMENT    progressive shortness of breath over the last several months while on Taxotere - recent PET/CT reveals new bilateral large pleural effusions without evidence of metastatic disease to the pleura - there are also new mildly FDG avid bilateral upper lobe pulmonary opacities - these findings are likely all related to chemo toxicity with pneumonitis (pulmonary edema would not be FDG avid) and capillary leak causing pleural effusions.    PLAN/RECOMMENDATIONS    oxygen supplementation to keep saturation greater than 92%  bilateral thoracenteses  prednisone 50mg daily  need to be cautious using corticosteroids until we "rule out" underlying infection - pan-culture - RVP - (?) BAL  continue anti-convulsant following intervention on brain mets  diligent DVT prophylaxis - SQ heparin  lexapro  synthroid    Thank you for the courtesy of this referral. Plan of care discussed with the patient and her  at bedside and Dr. Maury Hanks MD, [facility] - [phone]  Pulmonary Medicine

## 2018-02-22 NOTE — ED PROVIDER NOTE - PROGRESS NOTE DETAILS
PMD does not admit to [facility]; will admit to Dr. Melgar, who is at the bedside.

## 2018-02-22 NOTE — CONSULT NOTE ADULT - ASSESSMENT
1) NSCLC- hold. rx. Has been on taxotere.  - will consider change to an alternative regimen as outpatient.  2) Pulm- Pleural effusion and possible pneumonitis - Possibly related to treatment with taxotere.  s/p throrocentesis. for contralateral drainage on 2/23.  - mgmt as per pulm.

## 2018-02-22 NOTE — H&P ADULT - HISTORY OF PRESENT ILLNESS
61 y/o female who presents with significant h/o left lung cancer diagnosed in 2014. Initially treated with chemo and RT followed by surgery, currently on chemo therapy last chemo was 5/25/2017. Then in June 2015, diagnosed with metastatic cancer of right wrist. Treated with Gamma knife followed by RT. In July 2015, diagnosed with metastatic cancer of the brain. Treated with surgery followed with gamma knife and immune therapy. Presents with right wrist tumor. Subsequent xray and PET scan confirmed pathology. s/p exploration resection lesion right wrist on 2016. She has developed progressive shortness of breath exacerbated by exertion and associated with weakness and a non-productive cough over the last 2 -3 months. She has had no fevers, chills or sweats. No chest pain/pressure or palpitations. No recent URI/viral type symptoms. Outpatient PET/CT several days ago revealed bilateral large pleural effusions and upper lobe infiltrates. She is sent to the ER for further evaluation and treatment.

## 2018-02-22 NOTE — ED ADULT NURSE NOTE - OBJECTIVE STATEMENT
63 yr old female to ed via wheelchair c/o sob last few months. Pt tr for ca lung Last chemo x1 week ago. Pt denies chest pain Afebrile had recent petscan showing increased fluid . Alert and orietedx3. Oxygen being delivered n/c  3l 02 sat 98% B/p 112 58 repeated in the ED

## 2018-02-22 NOTE — ED ADULT NURSE REASSESSMENT NOTE - NS ED NURSE REASSESS COMMENT FT1
received a 63 y.o female from MARIAN Cates, patient aaox3 ambulattory asdmitted to medicine for Rt pleural effusion. Seen and evaluated by pulmonologist and an invasive procedure done at bedside to remove fluids. Noted a bandaid applied to the rt lateral back, no bleeding noted. Report handoff to MARIAN Richard of holding for continuity  of care.

## 2018-02-22 NOTE — H&P ADULT - FAMILY HISTORY
Mother  Still living? Unknown  Family history of bone cancer, Age at diagnosis: Age Unknown     Father  Still living? Unknown  Family history of heart disease, Age at diagnosis: Age Unknown

## 2018-02-22 NOTE — CONSULT NOTE ADULT - SUBJECTIVE AND OBJECTIVE BOX
CC: fatigue and SOB    History of Present Illness:       64 y/o woman with h/o NSCLC. Initial dx in  with rx with ctx/xrt. Subsequent NIKKI resection. During summer of  patient developed right wrist recurrence and brain mets. Both were rx'd with resection and XRT. Patient with multple rx regimens in recent years. Carbo/alimta-->Taceva-->opdivo and most recently has been on rx with Taxotere ( for close to a year). Recent f/u PET/CT showed worsening b/l effusions. Patient with b/l pathcy opacities. Films reviewed with radiology and felt to be possibly related to inflammation, pneumonitis. Patient now with worsening fatigue. No cp or palp. + cough, + li. Comes to ED for these sx.  Patient s/p 1.5 l thoracentesis. reported as transudative.           Past Medical History:      Cardiovascular: hyperlipidemia, hypertension.   Gynecology: cervical conization.   Endocrinology: hypothyroidism, osteopenia.   Psychiatry: anxiety disorder, depression.   Respiratory: COPD.         Surgical History:      Surgery: .         Allergies:      No Known Drug Allergies         Medications:      MEDICATIONS  (STANDING):  predniSONE   Tablet 50 milliGRAM(s) Oral daily      Social History:      Employment: part-time.   Marital: .   Alcohol: occasional EtOH.   Tobacco: is a former smoker.         Family History:      Mother:  Heart Disease   Father:  Cancer         Review Of Systems:    Pulmonary: Admits dyspnea with exertion, cough, shortness of breath. Denies hemoptysis, phlegm, wheezing.   General: Admits fatigue. Denies bone pain, chills, fevers, loss of appetite, malaise, sweats, weight loss.   Neurological: Admits weakness. Denies dizziness, headache, loss of consciousness, numbness, tingling.   Skin: Denies pruritis, rash.   HEENT: Denies blurring, change in vision, double vision, epistaxis, loss of hearing, nasal congesion, oral sores, rhinorrhea, sore throat, tinnitus.   Cardiovascular: Denies chest pain, diaphoresis, orthopnea, palpitations.   Gastrointestinal: Denies abdominal pain, bright red blood per rectum, constipation, cramping, dark stools, diarrhea, dysphagia, loss of bowel control, nausea, vomiting.   Genitourinary: Denies change in urine color, dysuria, frequency, hematuria, inability to urinate, loss of urine control, nocturia.   Extremities: Denies pain, swelling.   Back: Denies back pain.   Endocrine: Denies frequent thirst, frequent urination, heat or cold intolerance.   Hematologic: Denies bleeding, easy bruising, lymph node enlargement.   Psychiatric: Denies anxiety, depression, insomnia.          Vital Signs:  Vital Signs Last 24 Hrs  T(C): 36.9 (2018 19:51), Max: 36.9 (2018 14:15)  T(F): 98.4 (2018 19:51), Max: 98.4 (2018 14:15)  HR: 75 (2018 19:51) (75 - 94)  BP: 101/76 (2018 19:51) (73/50 - 123/62)  BP(mean): --  RR: 16 (2018 19:51) (16 - 20)  SpO2: 97% (2018 19:51) (95% - 99%)            Physical Exam:     General: The patient appears well in no apparent distress.   Skin: Skin without pallor.  No rashes, erythema or ecchymosis noted.   HEENT: Pupils are equal, round, and reactive to light and accommodation.  Extraocular muscles are intact.  Sclerae are anicteric.  There is no conjunctival pallor.  Oral mucus membranes appear moist with no oral sores.  There is no pharyngeal injection.   Neck: The neck is supple with no JVD, no lymphadenopathy.  There is however palpable and enlarging right axillary adenopathy.  Heart: Normal S1, S2.  Regular rate and rhythm.  No murmurs, gallops or rubs.   Chest: Lungs are clear to auscultation bilaterally.  No wheezing or rhonchi noted.   Abdomen: Abdomen is soft, non-tender, and non-distended with positive bowel sounds.   No hepatosplenomegaly palpated.   Extremities: There is no clubbing, cyanosis or edema.  Right wrist swelling present.  Neurological: Alert and oriented x 3.  No focal sensory or motor deficits.  CN II-XII intact.        Laboratory Data:          ( @ 15:22)                      13.2  6.7 )-----------( 480                 39.1    Neutrophils = 5.0 (74.5%)  Lymphocytes = 1.1 (16.6%)  Eosinophils = 0.0 (0.1%)  Basophils = 0.0 (0.5%)  Monocytes = 0.6 (8.4%)  Bands = --%        137  |  101  |  17  ----------------------------<  89  4.0   |  20<L>  |  0.81    Ca    10.1      2018 15:22    TPro  7.1  /  Alb  3.3  /  TBili  0.3  /  DBili  x   /  AST  29  /  ALT  12  /  AlkPhos  71      ( 2018 15:22 )   PT: 11.1 sec;   INR: 1.02 ratio;       PTT:28.9 sec

## 2018-02-22 NOTE — H&P ADULT - ASSESSMENT
63 y/o female with pmh significant of lung cancer diagnosed in 2014. Initially treated with chemo and RT followed by surgery, Then in June 2015, diagnosed with metastatic cancer of right wrist. Treated with Gamma knife followed by RT. In July 2015, diagnosed with metastatic cancer of the brain. Treated with surgery followed with gamma knife and immune therapy. Presents with right wrist tumor. Subsequent xray and PET scan confirmed pathology. s/p exploration resection lesion right wrist on 2016. She has developed progressive shortness of breath exacerbated by exertion and associated with weakness and a non-productive cough over the last 2 -3 months. She has had no fevers, chills or sweats. No chest pain/pressure or palpitations. No recent URI/viral type symptoms. Outpatient PET/CT several days ago revealed bilateral large pleural effusions and upper lobe infiltrates. She is sent to the ER for further evaluation and treatment.      1- dyspnea : sec to B pleural effusion  ..d/w  ..input noted and appreciated  progressive shortness of breath over the last several months while on Taxotere - recent PET/CT reveals new bilateral large pleural effusions without evidence of metastatic disease to the pleura - there are also new mildly FDG avid bilateral upper lobe pulmonary opacities - these findings are likely all related to chemo toxicity with pneumonitis (pulmonary edema would not be FDG avid) and capillary leak causing pleural effusions.  cont O2 , f/u cultures /cytology , check RVP   cont stereoids , FS and PPI   check Echo   f/u with  and      2-  hypothyroid :  check TSH /Free T4  and cont synthroid   3- Metastic lung CA to wrist and brain : f/u with  .  cont keppra   4- hx of hypotension on middrine..cont enad monitor BP     DVT Prophy 63 y/o female with pmh significant of lung cancer diagnosed in 2014. Initially treated with chemo and RT followed by surgery, Then in June 2015, diagnosed with metastatic cancer of right wrist. Treated with Gamma knife followed by RT. In July 2015, diagnosed with metastatic cancer of the brain. Treated with surgery followed with gamma knife and immune therapy. Presents with right wrist tumor. Subsequent xray and PET scan confirmed pathology. s/p exploration resection lesion right wrist on 2016. She has developed progressive shortness of breath exacerbated by exertion and associated with weakness and a non-productive cough over the last 2 -3 months. She has had no fevers, chills or sweats. No chest pain/pressure or palpitations. No recent URI/viral type symptoms. Outpatient PET/CT several days ago revealed bilateral large pleural effusions and upper lobe infiltrates. She is sent to the ER for further evaluation and treatment.      1- dyspnea : sec to B pleural effusion  ..d/w  ..input noted and appreciated  progressive shortness of breath over the last several months while on Taxotere - recent PET/CT reveals new bilateral large pleural effusions without evidence of metastatic disease to the pleura - there are also new mildly FDG avid bilateral upper lobe pulmonary opacities - these findings are likely all related to chemo toxicity with pneumonitis (pulmonary edema would not be FDG avid) and capillary leak causing pleural effusions.  cont O2 , f/u cultures /cytology , check RVP   cont stereoids , FS and PPI   check Echo   f/u with  and      2-  hypothyroid :  check TSH /Free T4  and cont synthroid   3- Metastic lung CA to wrist and brain : f/u with  .  cont keppra   4- hx of hypotension on middrine..cont enad monitor BP   5- cachexia : sec to lung ca   nutrition consult     DVT Prophy

## 2018-02-22 NOTE — ED PROVIDER NOTE - NS_ ATTENDINGSCRIBEDETAILS _ED_A_ED_FT
MD Vargas:  The scribe's documentation has been prepared under my direction and personally reviewed by me in its entirety. I confirm that the note above accurately reflects all work, treatment, procedures, and medical decision making performed by me.  MD Vargas:  see the MDM & progress notes for my I&P.

## 2018-02-22 NOTE — CONSULT NOTE ADULT - SUBJECTIVE AND OBJECTIVE BOX
[facility] - [facility]  CONSULT NOTE    HPI: 63 year old gentlewoman, former smoker, diagnosed with advanced lung cancer after developing severe weight loss in . She underwent "up front" chemotherapy and radiation followed by surgical resection. 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     PMHX:  Lung cancer - metastatic to bone and brain  Heart murmur  Hypothyroidism  Hyperlipidemia  Hypertension  Seizure  Anxiety type Depression  Hyponatremia    PSHX:  S/P wrist surgery  H/O brain surgery  History of lung surgery        FAMILY HISTORY:  Family history of heart disease: Mom  Family history of bone cancer      SOCIAL HISTORY: ; former smoker    Pulmonary Medications:       Antimicrobials:      Cardiology:      Other:  sodium chloride 0.9% Bolus 1000 milliLiter(s) IV Bolus once      Allergies    penicillin (Unknown)  Zosyn (Urticaria; Rash; Hives)      HOME MEDICATIONS: see  H & P    REVIEW OF SYSTEMS:  Constitutional: As per HPI  HEENT: Within normal limits  CV: As per HPI  Resp: As per HPI  GI: Within normal limits   : Within normal limits  Musculoskeletal: Within normal limits  Skin: Within normal limits  Neurological: Within normal limits  Psychiatric: Within normal limits  Endocrine: Within normal limits  Hematologic/Lymphatic: Within normal limits  Allergic/Immunologic: Within normal limits    [x] All other systems negative                                                                                                                                                                              OBJECTIVE:    PHYSICAL EXAM:  ICU Vital Signs Last 24 Hrs  T(C): 36.9 (2018 14:15), Max: 36.9 (2018 14:15)  T(F): 98.4 (2018 14:15), Max: 98.4 (2018 14:15)  HR: 92 (2018 15:08) (92 - 94)  BP: 115/83 (2018 15:08) (73/50 - 115/83)  BP(mean): --  ABP: --  ABP(mean): --  RR: 18 (2018 15:08) (18 - 20)  SpO2: 99% (2018 15:08) (95% - 99%) on 2lpm     General: Awake. Alert. Cooperative. No distress. Appears stated age. Chronically ill appearing	  HEENT:   Atraumatic. Bitemporal wasting. Anicteric. Normal oral mucosa, PERRL, EOMI  Neck: Supple. Trachea midline. Thyroid without enlargement/tenderness/nodules. No carotid bruit. No JVD. Loss of bilateral supraclavicular fat pads  Cardiovascular: Regular rate and rhythm. S1 S2 normal. II/VI systolic murmur  Respiratory: Respirations unlabored. Decreased breath sounds bilaterally ~ 1/2 up with dullness to percussion  Abdomen: Soft. Non-tender. Non-distended. No organomegaly. No masses. Normal bowel sounds	  Extremities: Warm to touch. No clubbing or cyanosis. No pedal edema.  Pulses: 2+ peripheral pulses all extremities	  Skin: Normal skin color. No rashes or lesions. No ecchymoses, No cyanosis. Warm to touch  Lymph Nodes: Cervical, supraclavicular and axillary nodes normal  Neurological: Motor and sensory examination equal and normal. A and O x 3  Psychiatry: Appropriate mood and affect.      LABS:                                MICROBIOLOGY:       RADIOLOGY:  [x ] Chest radiographs reviewed and interpreted by me    < from: NM PET/CT Oncology FDG WB, Subsq (18 @ 13:40) >    EXAM:  PETCT WB ONC FDG SUBS        PROCEDURE DATE:  2018           INTERPRETATION:  PROCEDURE:  PET/CT WHOLE BODY     RADIOPHARMACEUTICAL:  14.14 mCi F-18, FDG, I.V.    CLINICAL INFORMATION:  63-year-old female referred for follow-up of left  lung cancer on chemotherapy. Assess therapy response. PET/CT is done as   part of the subsequent treatment strategy evaluation.    TECHNIQUE:  Fasting blood sugar measured prior to injection of   radiopharmaceutical was 111 mg/dl. Following intravenous injection of the   radiopharmaceutical and an uptake period of approximately 50 minutes,   FDG-PET/CT was obtained on a [employer] Discovery 710 from the vertex of the   skull to the toes. Oral contrast was given during the uptake period. CT   was performed during shallow respiration. The CT protocol was optimized   for PET attenuation correction and to provide anatomic detail for   localization of PET abnormalities. The CT protocol was not designed to   produce and cannot replace state-of-the-art diagnostic CT images with   specific imaging protocols for different body parts and indications.   Images were reviewed on a dedicated workstation using multiplanar   reconstruction.    The standardized uptake values (SUV) are normalized to patient body  weight and indicate the highest activity concentration (SUVmax) in a   given disease site. All image numbers refer to the axial image number.    COMPARISON:  PET/CT dated 2017.    OTHER STUDIES USED FOR CORRELATION: MRI of brain dated 2017.    FINDINGS: Minimally FDG-avid postsurgical changes of left   parieto-occipital craniotomy are unchanged.    There is physiologic FDG activity in the brain, head, and neck, including   physiologic muscle activity.    Right chest wall Mediport with catheter tip in the cavoatrial junction is   unchanged.    Peripherally hypermetabolic centrally photopenic difficult to delineate   left lower paraesophageal lymph node is not significantly changed in size   or metabolism (SUV 6.0; image 125; previous SUV 8.7). Remainder of   mediastinal and bilateral hilar regions demonstrate physiologic FDG   activity.    Non FDG-avid right axillary lymph node measuring 2.3 x 1.7 cm is   unchanged (image 117). No enlarged or FDG-avid left axillary lymph node.   No abnormal FDG activity in either breast.    As noted in prior exams, patient is status post left upper lobe wedge   resection. There are new mildly FDG-avid bilateral upper lobe parenchymal   opacities which may reflect multifocal pneumonia and/or pulmonary edema.   For reference, right upper lobe opacity demonstrates SUV 2.9 (image 118).   Mildly FDG-avid post radiation therapy changes in left paramediastinal   region are not significantly changed. Large bilateral pleural effusions   are increased in size since 2017. There is compressive atelectasis   of bilateral lower lobes.    Physiologic FDG activity seen in the liver (SUV mean 2.3; previously   2.4), spleen, bilateral kidneys, right ureter, and urinary bladder. No   abnormal FDG activity in the adrenal glands. Cholelithiasis.    Hypermetabolic focus associated with orthopedic hardware in distal right   radius, significantly less conspicuous on the uncorrected image, and   decreased in metabolism as compared to prior study, may in part be   related to artifact from orthopedic hardware (SUV 6.8; image 217;   previous SUV 16.1). As on prior exam, streak artifact from orthopedic   hardware limits evaluation. Remainder of the osseous structures   demonstrate physiologic FDG activity.    IMPRESSION:   Whole-body PET/CT scan demonstrates:    1. Mildly FDG-avid bilateral upper lobe pulmonary opacities, new as   compared to PET/CT dated 2017, may reflect multifocal pneumonia   and/or pulmonary edema. Large bilateral pleural effusions are increased   in size. There is adjacent compressive atelectasis of the lower lobes.   Dr. Jourdan Olivo informed Dr. Mendiola of these findings at 1:52 PM on   2018.    2. Peripherally hypermetabolic, centrally photopenic difficult to   delineate left lower paraesophageal lymph node is not significantly   changed as compared to PET/CT dated 2017.    3. Hypermetabolic focus adjacent to orthopedic hardware in right radius,   significantly less conspicuous on the uncorrected image, and decreased in   intensity as compared to prior study, may in part be related to artifact.     4. No new lesion.    LARA MARCELINO M.D., NUCLEAR MEDICINE ATTENDING  This document has been electronically signed. 2018  5:30PM      < end of copied text >  ---------------------------------------------------------------------------------------------------------    < from: MR Head w/wo IV Cont (12.13.17 @ 15:17) >    EXAM:  MR BRAIN WAW IC        PROCEDURE DATE:  2017           INTERPRETATION:  HISTORY: History of lung cancer and brain mass status   post surgery. Follow-up. Status post radiation therapy. Brain tumor   follow-up.    Description: MRI of thebrain with and without gadolinium contrast was   performed.    COMPARISON: Brain MRI studies 2017, 2017.    Sagittal T1, axial T1, T2, FLAIR, SWI, and diffusion-weighted series were   obtained before contrast. After intravenous gadoliniumcontrast   administration, sagittal, coronal, and axial T1 postcontrast series were   obtained.    5 cc intravenous Gadovist gadolinium contrast was administered, 5 cc   contrast was discarded.    Left parietal craniotomy changes are again noted. Thesmall encephalocele   at the craniotomy margins is stable in appearance.    Evolving postoperative changes are again noted in the left parietal lobe.   The nonspecific enhancement at the margins of the surgical cavity is   stable. No new areas of enhancement are present. Nonspecific T2 and FLAIR   signal changes surrounding the surgical cavity are stable.    No new enhancing intracranial lesions are present. There is no evidence   for acute infarct, acute hemorrhage, or hydrocephalus. 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    The right internal carotid artery flow void is smaller compared to   contralateral side most consistent with a stenosis. Consider correlation   with brain and neck MRA imaging follow-up if clinically warranted.    IMPRESSION:    The nonspecific enhancement at the margins of the left parietal surgical   cavity is stable.    SEE BEASLEY M.D., ATTENDING RADIOLOGIST  This document has been electronically signed. Dec 13 2017  3:47PM      < end of copied text >    --------------------------------------------------------------------------------------------------------- [facility] - [facility]  CONSULT NOTE    HPI: 63 year old gentlewoman, former smoker, diagnosed with advanced lung cancer after developing severe weight loss in . She underwent "up front" chemotherapy and radiation followed by surgical resection. 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 The patient has been receiving Taxotere over the last several months. She has developed progressive shortness of breath exacerbated by exertion and associated with weakness and a non-productive cough over the last 2 -3 months. She has had no fevers, chills or sweats. No chest pain/pressure or palpitations. No recent URI/viral type symptoms. Outpatient PET/CT several days ago revealed bilateral large pleural effusions and upper lobe infiltrates. She is sent to the ER for further evaluation and treatment.    PMHX:  Lung cancer - metastatic to bone and brain  Heart murmur  Hypothyroidism  Hyperlipidemia  Hypertension  Seizure  Anxiety type Depression  Hyponatremia    PSHX:  S/P wrist surgery  H/O brain surgery  History of lung surgery        FAMILY HISTORY:  Family history of heart disease: Mom  Family history of bone cancer      SOCIAL HISTORY: lives with significant other; former smoker; worked in [employer] for [employer]    Pulmonary Medications:       Antimicrobials:      Cardiology:      Other:  sodium chloride 0.9% Bolus 1000 milliLiter(s) IV Bolus once      Allergies    penicillin (Unknown)  Zosyn (Urticaria; Rash; Hives)      HOME MEDICATIONS: see  H & P    REVIEW OF SYSTEMS:  Constitutional: As per HPI  HEENT: Within normal limits  CV: As per HPI  Resp: As per HPI  GI: Within normal limits   : Within normal limits  Musculoskeletal: Within normal limits  Skin: Within normal limits  Neurological: Within normal limits  Psychiatric: Within normal limits  Endocrine: Within normal limits  Hematologic/Lymphatic: Within normal limits  Allergic/Immunologic: Within normal limits    [x] All other systems negative                                                                                                                                                                              OBJECTIVE:    PHYSICAL EXAM:  ICU Vital Signs Last 24 Hrs  T(C): 36.9 (2018 14:15), Max: 36.9 (2018 14:15)  T(F): 98.4 (2018 14:15), Max: 98.4 (2018 14:15)  HR: 92 (2018 15:08) (92 - 94)  BP: 115/83 (2018 15:08) (73/50 - 115/83)  BP(mean): --  ABP: --  ABP(mean): --  RR: 18 (2018 15:08) (18 - 20)  SpO2: 99% (2018 15:08) (95% - 99%) on 2lpm     General: Awake. Alert. Cooperative. No distress. Appears stated age. Chronically ill appearing	  HEENT:   Atraumatic. Bitemporal wasting. Anicteric. Normal oral mucosa, PERRL, EOMI  Neck: Supple. Trachea midline. Thyroid without enlargement/tenderness/nodules. No carotid bruit. No JVD. Loss of bilateral supraclavicular fat pads  Cardiovascular: Regular rate and rhythm. S1 S2 normal. II/VI systolic murmur  Respiratory: Respirations unlabored. Decreased breath sounds bilaterally ~ 1/2 up with dullness to percussion. Kyphosis  Abdomen: Soft. Non-tender. Non-distended. No organomegaly. No masses. Normal bowel sounds	  Extremities: Warm to touch. No clubbing or cyanosis. No pedal edema.  Pulses: 2+ peripheral pulses all extremities	  Skin: Normal skin color. No rashes or lesions. No ecchymoses, No cyanosis. Warm to touch  Lymph Nodes: Cervical, supraclavicular and axillary nodes normal  Neurological: Motor and sensory examination equal and normal. A and O x 3  Psychiatry: Appropriate mood and affect.      LABS:    Activated Partial Thromboplastin Time in AM (18 @ 15:22)    Activated Partial Thromboplastin Time: 28.9: The recommended therapeutic heparin range (full dose) is 58-99 seconds.  Recommended therapeutic Argatroban range is 1.5 to 3.0 times the baseline  APTT value, not to exceed 100 seconds. Recommended therapeutic Refludan  range is 1.5 to 2.5 times thebaseline APTT. sec    Prothrombin Time and INR, Plasma in AM (18 @ 15:22)    Prothrombin Time, Plasma: 11.1: Effective , the reference range for PT has changed. sec    INR: 1.02: RECOMMENDED RANGES FOR THERAPEUTIC INR:    2.0-3.0 for most medical and surgical thromboembolic states    2.0-3.0 for atrial fibrillation    2.0-3.0 for bileaflet mechanical valve in aortic position    2.5-3.5 for mechanical heart valves   Chest 2004;[id number]  The presence of direct thrombin inhibitors (argatroban, refludan)  may falsely increase results. ratio    Comprehensive Metabolic Panel (18 @ 15:22)    Sodium, Serum: 137 mmol/L    Potassium, Serum: 4.0 mmol/L    Chloride, Serum: 101 mmol/L    Carbon Dioxide, Serum: 20 mmol/L    Anion Gap, Serum: 16 mmol/L    Blood Urea Nitrogen, Serum: 17 mg/dL    Creatinine, Serum: 0.81 mg/dL    Glucose, Serum: 89 mg/dL    Calcium, Total Serum: 10.1 mg/dL    Protein Total, Serum: 7.1 g/dL    Albumin, Serum: 3.3 g/dL    Bilirubin Total, Serum: 0.3 mg/dL    Alkaline Phosphatase, Serum: 71 U/L    Aspartate Aminotransferase (AST/SGOT): 29 U/L    Alanine Aminotransferase (ALT/SGPT): 12 U/L RC    eGFR if Non : 77: Interpretative comment  The units for eGFR are ml/min/1.73m2 (normalized body surface area). The  eGFR is calculated from a serum creatinine using the CKD-EPI equation.  Other variables required for calculation are race, age and sex. Among  patients with chronic kidney disease (CKD), the eGFR is useful in  determining the stage of disease according to KDOQI CKD classification.  All eGFR results are reported numerically with the following  interpretation.          GFR                    With                 Without     (ml/min/1.73 m2)    Kidney Damage       Kidney Damage        >= 90                    Stage 1                     Normal        60-89                    Stage 2                     Decreased GFR        30-59     Stage 3                     Stage 3        15-29                    Stage 4                     Stage 4        < 15                      Stage 5                     Stage 5  Each stage of CKD assumes that the associated GFR level has been in  effect for at least 3 months. Determination of stages one and two (with  eGFR > 59 ml/min/m2) requires estimation of kidney damage for at least 3  months as defined by structural or functional abnormalities.  Limitations: All estimates of GFR will be less accurate for patients at  extremes of muscle mass (including but not limited to frail elderly,  critically ill, or cancer patients), those with unusual diets, and those  with conditions associated with reduced secretion or extrarenal  elimination of creatinine. The eGFR equation is not recommended for use  in patients with unstable creatinine levels. mL/min/1.73M2    eGFR if African American: 90 mL/min/1.73M2    Complete Blood Count + Automated Diff (18 @ 15:22)    WBC Count: 6.7 K/uL    RBC Count: 4.19 M/uL    Hemoglobin: 13.2 g/dL    Hematocrit: 39.1 %    Mean Cell Volume: 93.2 fl    Mean Cell Hemoglobin: 31.5 pg    Mean Cell Hemoglobin Conc: 33.8 gm/dL    Red Cell Distrib Width: 15.9 %    Platelet Count - Automated: 480 K/uL    Auto Neutrophil #: 5.0 K/uL    Auto Lymphocyte #: 1.1 K/uL    Auto Monocyte #: 0.6 K/uL    Auto Eosinophil #: 0.0 K/uL    Auto Basophil #: 0.0 K/uL    Auto Neutrophil %: 74.5: Differential percentages must be correlated with absolute numbers for  clinical significance. %    Auto Lymphocyte %: 16.6 %    Auto Monocyte %: 8.4 %    Auto Eosinophil %: 0.1 %    Auto Basophil %: 0.5 %    MICROBIOLOGY:     RADIOLOGY:  [x ] Chest radiographs reviewed and interpreted by me    < from: NM PET/CT Oncology FDG WB, Subsq (18 @ 13:40) >    EXAM:  PETCT WB ONC FDG SUBS        PROCEDURE DATE:  2018           INTERPRETATION:  PROCEDURE:  PET/CT WHOLE BODY     RADIOPHARMACEUTICAL:  14.14 mCi F-18, FDG, I.V.    CLINICAL INFORMATION:  63-year-old female referred for follow-up of left  lung cancer on chemotherapy. Assess therapy response. PET/CT is done as   part of the subsequent treatment strategy evaluation.    TECHNIQUE:  Fasting blood sugar measured prior to injection of   radiopharmaceutical was 111 mg/dl. Following intravenous injection of the   radiopharmaceutical and an uptake period of approximately 50 minutes,   FDG-PET/CT was obtained on a [employer] Discovery 710 from the vertex of the   skull to the toes. Oral contrast was given during the uptake period. CT   was performed during shallow respiration. The CT protocol was optimized   for PET attenuation correction and to provide anatomic detail for   localization of PET abnormalities. The CT protocol was not designed to   produce and cannot replace state-of-the-art diagnostic CT images with   specific imaging protocols for different body parts and indications.   Images were reviewed on a dedicated workstation using multiplanar   reconstruction.    The standardized uptake values (SUV) are normalized to patient body  weight and indicate the highest activity concentration (SUVmax) in a   given disease site. All image numbers refer to the axial image number.    COMPARISON:  PET/CT dated 2017.    OTHER STUDIES USED FOR CORRELATION: MRI of brain dated 2017.    FINDINGS: Minimally FDG-avid postsurgical changes of left   parieto-occipital craniotomy are unchanged.    There is physiologic FDG activity in the brain, head, and neck, including   physiologic muscle activity.    Right chest wall Mediport with catheter tip in the cavoatrial junction is   unchanged.    Peripherally hypermetabolic centrally photopenic difficult to delineate   left lower paraesophageal lymph node is not significantly changed in size   or metabolism (SUV 6.0; image 125; previous SUV 8.7). Remainder of   mediastinal and bilateral hilar regions demonstrate physiologic FDG   activity.    Non FDG-avid right axillary lymph node measuring 2.3 x 1.7 cm is   unchanged (image 117). No enlarged or FDG-avid left axillary lymph node.   No abnormal FDG activity in either breast.    As noted in prior exams, patient is status post left upper lobe wedge   resection. There are new mildly FDG-avid bilateral upper lobe parenchymal   opacities which may reflect multifocal pneumonia and/or pulmonary edema.   For reference, right upper lobe opacity demonstrates SUV 2.9 (image 118).   Mildly FDG-avid post radiation therapy changes in left paramediastinal   region are not significantly changed. Large bilateral pleural effusions   are increased in size since 2017. There is compressive atelectasis   of bilateral lower lobes.    Physiologic FDG activity seen in the liver (SUV mean 2.3; previously   2.4), spleen, bilateral kidneys, right ureter, and urinary bladder. No   abnormal FDG activity in the adrenal glands. Cholelithiasis.    Hypermetabolic focus associated with orthopedic hardware in distal right   radius, significantly less conspicuous on the uncorrected image, and   decreased in metabolism as compared to prior study, may in part be   related to artifact from orthopedic hardware (SUV 6.8; image 217;   previous SUV 16.1). As on prior exam, streak artifact from orthopedic   hardware limits evaluation. Remainder of the osseous structures   demonstrate physiologic FDG activity.    IMPRESSION:   Whole-body PET/CT scan demonstrates:    1. Mildly FDG-avid bilateral upper lobe pulmonary opacities, new as   compared to PET/CT dated 2017, may reflect multifocal pneumonia   and/or pulmonary edema. Large bilateral pleural effusions are increased   in size. There is adjacent compressive atelectasis of the lower lobes.   Dr. Jourdan Olivo informed Dr. Mendiola of these findings at 1:52 PM on   2018.    2. Peripherally hypermetabolic, centrally photopenic difficult to   delineate left lower paraesophageal lymph node is not significantly   changed as compared to PET/CT dated 2017.    3. Hypermetabolic focus adjacent to orthopedic hardware in right radius,   significantly less conspicuous on the uncorrected image, and decreased in   intensity as compared to prior study, may in part be related to artifact.     4. No new lesion.    LARA MARCELINO M.D., NUCLEAR MEDICINE ATTENDING  This document has been electronically signed. 2018  5:30PM      < end of copied text >  ---------------------------------------------------------------------------------------------------------    < from: MR Head w/wo IV Cont (12.13.17 @ 15:17) >    EXAM:  MR BRAIN WAW IC        PROCEDURE DATE:  2017           INTERPRETATION:  HISTORY: History of lung cancer and brain mass status   post surgery. Follow-up. Status post radiation therapy. Brain tumor   follow-up.    Description: MRI of thebrain with and without gadolinium contrast was   performed.    COMPARISON: Brain MRI studies 2017, 2017.    Sagittal T1, axial T1, T2, FLAIR, SWI, and diffusion-weighted series were   obtained before contrast. After intravenous gadoliniumcontrast   administration, sagittal, coronal, and axial T1 postcontrast series were   obtained.    5 cc intravenous Gadovist gadolinium contrast was administered, 5 cc   contrast was discarded.    Left parietal craniotomy changes are again noted. Thesmall encephalocele   at the craniotomy margins is stable in appearance.    Evolving postoperative changes are again noted in the left parietal lobe.   The nonspecific enhancement at the margins of the surgical cavity is   stable. No new areas of enhancement are present. Nonspecific T2 and FLAIR   signal changes surrounding the surgical cavity are stable.    No new enhancing intracranial lesions are present. There is no evidence   for acute infarct, acute hemorrhage, or hydrocephalus. 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    The right internal carotid artery flow void is smaller compared to   contralateral side most consistent with a stenosis. Consider correlation   with brain and neck MRA imaging follow-up if clinically warranted.    IMPRESSION:    The nonspecific enhancement at the margins of the left parietal surgical   cavity is stable.    SEE BEASLEY M.D., ATTENDING RADIOLOGIST  This document has been electronically signed. Dec 13 2017  3:47PM      < end of copied text >    ---------------------------------------------------------------------------------------------------------

## 2018-02-23 ENCOUNTER — RESULT REVIEW (OUTPATIENT)
Age: 63
End: 2018-02-23

## 2018-02-23 ENCOUNTER — TRANSCRIPTION ENCOUNTER (OUTPATIENT)
Age: 63
End: 2018-02-23

## 2018-02-23 VITALS
TEMPERATURE: 98 F | DIASTOLIC BLOOD PRESSURE: 84 MMHG | SYSTOLIC BLOOD PRESSURE: 123 MMHG | OXYGEN SATURATION: 99 % | RESPIRATION RATE: 18 BRPM | HEART RATE: 74 BPM

## 2018-02-23 LAB
B PERT IGG+IGM PNL SER: ABNORMAL
COLOR FLD: YELLOW — SIGNIFICANT CHANGE UP
COMMENT - FLUIDS: SIGNIFICANT CHANGE UP
FLUID INTAKE SUBSTANCE CLASS: SIGNIFICANT CHANGE UP
FLUID SEGMENTED GRANULOCYTES: 12 % — SIGNIFICANT CHANGE UP
GLUCOSE FLD-MCNC: 118 MG/DL — SIGNIFICANT CHANGE UP
GRAM STN FLD: SIGNIFICANT CHANGE UP
GRAM STN FLD: SIGNIFICANT CHANGE UP
LDH SERPL L TO P-CCNC: 112 U/L — SIGNIFICANT CHANGE UP
LYMPHOCYTES # FLD: 69 % — SIGNIFICANT CHANGE UP
MESOTHL CELL # FLD: 5 % — SIGNIFICANT CHANGE UP
MONOS+MACROS # FLD: 14 % — SIGNIFICANT CHANGE UP
NIGHT BLUE STAIN TISS: SIGNIFICANT CHANGE UP
PH FLD: 7.56 — SIGNIFICANT CHANGE UP
PROCALCITONIN SERPL-MCNC: <0.05 NG/ML — SIGNIFICANT CHANGE UP (ref 0–0.04)
PROT FLD-MCNC: 4.6 G/DL — SIGNIFICANT CHANGE UP
RCV VOL RI: 3563 /UL — HIGH (ref 0–5)
SPECIMEN SOURCE FLD: SIGNIFICANT CHANGE UP
SPECIMEN SOURCE: SIGNIFICANT CHANGE UP
T4 FREE SERPL-MCNC: 1.4 NG/DL — SIGNIFICANT CHANGE UP (ref 0.9–1.8)
TOTAL NUCLEATED CELL COUNT, BODY FLUID: 227 /UL — HIGH (ref 0–5)
TSH SERPL-MCNC: 4.62 UIU/ML — HIGH (ref 0.27–4.2)
TUBE TYPE: SIGNIFICANT CHANGE UP

## 2018-02-23 PROCEDURE — 78816 PET IMAGE W/CT FULL BODY: CPT

## 2018-02-23 PROCEDURE — 86900 BLOOD TYPING SEROLOGIC ABO: CPT

## 2018-02-23 PROCEDURE — 87581 M.PNEUMON DNA AMP PROBE: CPT

## 2018-02-23 PROCEDURE — 88341 IMHCHEM/IMCYTCHM EA ADD ANTB: CPT

## 2018-02-23 PROCEDURE — 88342 IMHCHEM/IMCYTCHM 1ST ANTB: CPT

## 2018-02-23 PROCEDURE — 84132 ASSAY OF SERUM POTASSIUM: CPT

## 2018-02-23 PROCEDURE — 99285 EMERGENCY DEPT VISIT HI MDM: CPT | Mod: 25

## 2018-02-23 PROCEDURE — 85027 COMPLETE CBC AUTOMATED: CPT

## 2018-02-23 PROCEDURE — 87206 SMEAR FLUORESCENT/ACID STAI: CPT

## 2018-02-23 PROCEDURE — 87116 MYCOBACTERIA CULTURE: CPT

## 2018-02-23 PROCEDURE — 87102 FUNGUS ISOLATION CULTURE: CPT

## 2018-02-23 PROCEDURE — 85014 HEMATOCRIT: CPT

## 2018-02-23 PROCEDURE — 32555 ASPIRATE PLEURA W/ IMAGING: CPT

## 2018-02-23 PROCEDURE — 83986 ASSAY PH BODY FLUID NOS: CPT

## 2018-02-23 PROCEDURE — 88112 CYTOPATH CELL ENHANCE TECH: CPT

## 2018-02-23 PROCEDURE — 88305 TISSUE EXAM BY PATHOLOGIST: CPT

## 2018-02-23 PROCEDURE — 89051 BODY FLUID CELL COUNT: CPT

## 2018-02-23 PROCEDURE — 86901 BLOOD TYPING SEROLOGIC RH(D): CPT

## 2018-02-23 PROCEDURE — 87486 CHLMYD PNEUM DNA AMP PROBE: CPT

## 2018-02-23 PROCEDURE — 87075 CULTR BACTERIA EXCEPT BLOOD: CPT

## 2018-02-23 PROCEDURE — 82803 BLOOD GASES ANY COMBINATION: CPT

## 2018-02-23 PROCEDURE — 85730 THROMBOPLASTIN TIME PARTIAL: CPT

## 2018-02-23 PROCEDURE — 83605 ASSAY OF LACTIC ACID: CPT

## 2018-02-23 PROCEDURE — 82435 ASSAY OF BLOOD CHLORIDE: CPT

## 2018-02-23 PROCEDURE — A9552: CPT

## 2018-02-23 PROCEDURE — 82945 GLUCOSE OTHER FLUID: CPT

## 2018-02-23 PROCEDURE — 87633 RESP VIRUS 12-25 TARGETS: CPT

## 2018-02-23 PROCEDURE — 99223 1ST HOSP IP/OBS HIGH 75: CPT

## 2018-02-23 PROCEDURE — 71045 X-RAY EXAM CHEST 1 VIEW: CPT

## 2018-02-23 PROCEDURE — 87015 SPECIMEN INFECT AGNT CONCNTJ: CPT

## 2018-02-23 PROCEDURE — 88112 CYTOPATH CELL ENHANCE TECH: CPT | Mod: 26

## 2018-02-23 PROCEDURE — 87205 SMEAR GRAM STAIN: CPT

## 2018-02-23 PROCEDURE — 82947 ASSAY GLUCOSE BLOOD QUANT: CPT

## 2018-02-23 PROCEDURE — 83615 LACTATE (LD) (LDH) ENZYME: CPT

## 2018-02-23 PROCEDURE — 88341 IMHCHEM/IMCYTCHM EA ADD ANTB: CPT | Mod: 26

## 2018-02-23 PROCEDURE — 80053 COMPREHEN METABOLIC PANEL: CPT

## 2018-02-23 PROCEDURE — 71045 X-RAY EXAM CHEST 1 VIEW: CPT | Mod: 26

## 2018-02-23 PROCEDURE — 84439 ASSAY OF FREE THYROXINE: CPT

## 2018-02-23 PROCEDURE — 93005 ELECTROCARDIOGRAM TRACING: CPT | Mod: XU

## 2018-02-23 PROCEDURE — 87798 DETECT AGENT NOS DNA AMP: CPT

## 2018-02-23 PROCEDURE — 87040 BLOOD CULTURE FOR BACTERIA: CPT

## 2018-02-23 PROCEDURE — 82330 ASSAY OF CALCIUM: CPT

## 2018-02-23 PROCEDURE — 84443 ASSAY THYROID STIM HORMONE: CPT

## 2018-02-23 PROCEDURE — 84145 PROCALCITONIN (PCT): CPT

## 2018-02-23 PROCEDURE — 85610 PROTHROMBIN TIME: CPT

## 2018-02-23 PROCEDURE — 84295 ASSAY OF SERUM SODIUM: CPT

## 2018-02-23 PROCEDURE — 93308 TTE F-UP OR LMTD: CPT

## 2018-02-23 PROCEDURE — 84157 ASSAY OF PROTEIN OTHER: CPT

## 2018-02-23 PROCEDURE — 88342 IMHCHEM/IMCYTCHM 1ST ANTB: CPT | Mod: 26

## 2018-02-23 PROCEDURE — 87070 CULTURE OTHR SPECIMN AEROBIC: CPT

## 2018-02-23 PROCEDURE — 88305 TISSUE EXAM BY PATHOLOGIST: CPT | Mod: 26

## 2018-02-23 PROCEDURE — 86850 RBC ANTIBODY SCREEN: CPT

## 2018-02-23 PROCEDURE — 32554 ASPIRATE PLEURA W/O IMAGING: CPT | Mod: RT

## 2018-02-23 RX ADMIN — HEPARIN SODIUM 5000 UNIT(S): 5000 INJECTION INTRAVENOUS; SUBCUTANEOUS at 06:43

## 2018-02-23 RX ADMIN — MIDODRINE HYDROCHLORIDE 5 MILLIGRAM(S): 2.5 TABLET ORAL at 10:13

## 2018-02-23 RX ADMIN — PANTOPRAZOLE SODIUM 40 MILLIGRAM(S): 20 TABLET, DELAYED RELEASE ORAL at 06:43

## 2018-02-23 RX ADMIN — LEVETIRACETAM 250 MILLIGRAM(S): 250 TABLET, FILM COATED ORAL at 17:37

## 2018-02-23 RX ADMIN — Medication 1 TABLET(S): at 13:09

## 2018-02-23 RX ADMIN — Medication 125 MICROGRAM(S): at 06:44

## 2018-02-23 RX ADMIN — ESCITALOPRAM OXALATE 5 MILLIGRAM(S): 10 TABLET, FILM COATED ORAL at 13:09

## 2018-02-23 RX ADMIN — MIDODRINE HYDROCHLORIDE 5 MILLIGRAM(S): 2.5 TABLET ORAL at 17:37

## 2018-02-23 RX ADMIN — LEVETIRACETAM 250 MILLIGRAM(S): 250 TABLET, FILM COATED ORAL at 06:44

## 2018-02-23 RX ADMIN — Medication 81 MILLIGRAM(S): at 13:09

## 2018-02-23 RX ADMIN — LEVETIRACETAM 250 MILLIGRAM(S): 250 TABLET, FILM COATED ORAL at 00:06

## 2018-02-23 RX ADMIN — Medication 100 MILLIGRAM(S): at 06:44

## 2018-02-23 RX ADMIN — ESCITALOPRAM OXALATE 5 MILLIGRAM(S): 10 TABLET, FILM COATED ORAL at 00:06

## 2018-02-23 RX ADMIN — Medication 100 MILLIGRAM(S): at 13:09

## 2018-02-23 NOTE — DISCHARGE NOTE ADULT - PLAN OF CARE
no sequela bilateral effusions drained  follow up with the pulmonologist  seek medical assistance if symptoms return

## 2018-02-23 NOTE — CHART NOTE - NSCHARTNOTEFT_GEN_A_CORE
instructed to discharge this patient post thoracentesis today.  Vital signs stable, afebrile.   No shortness of breath, no acute distress, off no complaints. Anxious for discharge  Post procedure chest x-ray completed - radiology contacted - "no pneumothorax, no acute findings"  Discharge confirmed with Dr Melgar

## 2018-02-23 NOTE — DISCHARGE NOTE ADULT - ADDITIONAL INSTRUCTIONS
Follow up the results of the cultures and testing sent in while you were hospitalized with your physicians (Blood cultures, "lung fluid" cultures and cytology)  Confirm your appointment with the Pulmonologist (Dr Lai) for next week.  Follow up with all of your physicians - Bring all discharge paperwork including discharge medication list to follow up appointments.

## 2018-02-23 NOTE — DISCHARGE NOTE ADULT - CARE PROVIDER_API CALL
Frandy Mendiola), Hematology; Medical Oncology  [address]  [address]  [place] [address]  Phone: [phone]  Fax: [phone]    Deedee Lai), Critical Care Medicine; Internal Medicine; Pulmonary Disease  [address]  [place] [address]  Phone: [phone]  Fax: [phone]

## 2018-02-23 NOTE — PROGRESS NOTE ADULT - SUBJECTIVE AND OBJECTIVE BOX
[facility]    PROCEDURE: THORACENTESIS    INDICATION: PLEURAL EFFUSION                    [x] DIAGNOSTIC                    [x] THERAPEUTIC                            [ ] r/o CHF                                    [x] r/o neoplasm                   [ ] r/o empyema                    [ ] r/o hemothorax      PRE-PROCEDURE  Informed consent was obtained after the risks and benefits of the procedure were explained. Risks described included but were not limited to bleeding, cough, pneumothorax (potentially requiring chest tube placement, vaso-vagal reactions, chest pain and death.    TIME OUT was performed prior to the procedure with verbal confirmation of correct patient identity, correct site, availability of necessary equipment, and accuracy of the consent form. Safety precautions based on the patient history and medication use have been addressed.    The patient was positioned in a sitting position at the edge of the bed, leaning forward with arms resting on a table. The posterior  [x] left    [ ] right     chest was prepped and draped with strict adherence to aseptic precautions after localization of the pleural fluid was performed using ultrasound guidance. Xylocaine 1% was injected for local anesthesia and then a thoracenesis catheter was advanced into the pleural space.     Approximately  1500 cc  of                [ ] clear yellow          [x] straw colored          [ ] serosanguinous          [ ] blood-tinged          [ ] bloody          [ ] purulent          [ ] chylous          [ ] cloudy     fluid was removed.    The patient tolerated the procedure and no immediate complications were observed. Post-procedure vital signs were stable.    [x] Specimens were sent for cytology, AFB smear and culure, fungal culture, routine culture, gram stain, cell count, pH and chemistry.    [ ] Specimens were not sent.    [x] A post-procedure CXR to r/o PTX has been ordered and will be checked.        Henrique Hanks MD, [facility]  [phone]

## 2018-02-23 NOTE — DISCHARGE NOTE ADULT - HOSPITAL COURSE
63 year old gentlewoman, former smoker, diagnosed with advanced lung cancer after developing severe weight loss in 2014. She underwent "up front" chemotherapy and radiation followed by surgical resection. 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 The patient has been receiving Taxotere over the last several months. She has developed progressive shortness of breath exacerbated by exertion and associated with weakness and a non-productive cough over the last 2 -3 months. She has had no fevers, chills or sweats. No chest pain/pressure or palpitations. No recent URI/viral type symptoms. Outpatient PET/CT several days ago revealed bilateral large pleural effusions and upper lobe infiltrates. She was sent to the ER for further evaluation and treatment.  On 2/22/18 she underwent an ultrasound guided right thoracentesis with 1500cc of fluid removed - borderline exudate - cultures and cytology were sent.  2/23/18 she returns to ultrasound for a left thoracentesis 63 year old gentlewoman, former smoker, diagnosed with advanced lung cancer after developing severe weight loss in 2014. She underwent "up front" chemotherapy and radiation followed by surgical resection. 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 The patient has been receiving Taxotere over the last several months. She has developed progressive shortness of breath exacerbated by exertion and associated with weakness and a non-productive cough over the last 2 -3 months. She has had no fevers, chills or sweats. No chest pain/pressure or palpitations. No recent URI/viral type symptoms. Outpatient PET/CT several days ago revealed bilateral large pleural effusions and upper lobe infiltrates. She was sent to the ER for further evaluation and treatment.  On 2/22/18 she underwent an ultrasound guided right thoracentesis with 1500cc of fluid removed - borderline exudate - cultures and cytology were sent.  2/23/18 she returns to ultrasound for a left thoracentesis with an additional 1500cc of fluid removed.  Cleared for discharge following left thoracentesis with follow up with Dr Lai on Tuesday 2/27/18.  Will follow up blood culture results and results of pleural fluid at that time

## 2018-02-23 NOTE — DISCHARGE NOTE ADULT - MEDICATION SUMMARY - MEDICATIONS TO TAKE
I will START or STAY ON the medications listed below when I get home from the hospital:    Boost supplement  -- 1 each by mouth once a day  -- Indication: For suppliment    Dulcolax oral tablet  -- 2 tab(s) by mouth once a day (at bedtime)  -- Indication: For laxative    predniSONE 50 mg oral tablet  -- 1 tab(s) by mouth once a day  -- Indication: For steroid    aspirin 81 mg oral delayed release tablet  -- 1 tab(s) by mouth once a day  -- Indication: For Prophylactic     calcium carbonate  -- 1 tab(s) by mouth once a day  -- Indication: For antacid    Keppra 500 mg oral tablet  -- 0.5 tab(s) by mouth 2 times a day  -- Indication: For seizure prophylactic    Lexapro 10 mg oral tablet  -- 0.5 tab(s) by mouth once a day  -- Indication: For depression    Compazine 10 mg oral tablet  -- 1 tab(s) by mouth every 6 hours, As Needed  -- Indication: For nausea and vomiting    Benadryl 25 mg oral tablet  -- 1 tab(s) by mouth once a day (at bedtime)  -- Indication: For antihistime    simvastatin 10 mg oral tablet  -- 1 tab(s) by mouth once a day  -- Indication: For cholesterol management    docusate sodium 100 mg oral capsule  -- 1 cap(s) by mouth once a day  -- Indication: For laxative    Benefiber oral powder for reconstitution  -- orally once a day  -- Indication: For laxative    midodrine 5 mg oral tablet  -- 1 tab(s) by mouth 3 times a day  -- Indication: For hypotension    levothyroxine 125 mcg (0.125 mg) oral tablet  -- 1 tab(s) by mouth once a day  -- Indication: For hypothyroid    One-A-Day Women oral tablet  -- 1 tab(s) by mouth once a day  -- Indication: For suppliment I will START or STAY ON the medications listed below when I get home from the hospital:    Boost supplement  -- 1 each by mouth once a day  -- Indication: For suppliment    Dulcolax oral tablet  -- 2 tab(s) by mouth once a day (at bedtime)  -- Indication: For laxative    predniSONE 50 mg oral tablet  -- 1 tab(s) by mouth once a day x 5 days - FOLLOW UP WITH PULMONOLOGIST IN 3 - 4 days  -- Indication: For Pneumonitis    aspirin 81 mg oral delayed release tablet  -- 1 tab(s) by mouth once a day  -- Indication: For Prophylactic     calcium carbonate  -- 1 tab(s) by mouth once a day  -- Indication: For antacid    Keppra 500 mg oral tablet  -- 0.5 tab(s) by mouth 2 times a day  -- Indication: For seizure prophylactic    Lexapro 10 mg oral tablet  -- 0.5 tab(s) by mouth once a day  -- Indication: For depression    Compazine 10 mg oral tablet  -- 1 tab(s) by mouth every 6 hours, As Needed  -- Indication: For nausea and vomiting    Benadryl 25 mg oral tablet  -- 1 tab(s) by mouth once a day (at bedtime)  -- Indication: For antihistime    simvastatin 10 mg oral tablet  -- 1 tab(s) by mouth once a day  -- Indication: For cholesterol management    docusate sodium 100 mg oral capsule  -- 1 cap(s) by mouth once a day  -- Indication: For laxative    Benefiber oral powder for reconstitution  -- orally once a day  -- Indication: For laxative    midodrine 5 mg oral tablet  -- 1 tab(s) by mouth 3 times a day  -- Indication: For hypotension    levothyroxine 125 mcg (0.125 mg) oral tablet  -- 1 tab(s) by mouth once a day  -- Indication: For hypothyroid    One-A-Day Women oral tablet  -- 1 tab(s) by mouth once a day  -- Indication: For suppliment

## 2018-02-23 NOTE — CONSULT NOTE ADULT - SUBJECTIVE AND OBJECTIVE BOX
CHIEF COMPLAINT: Patient is a 63y old  Female who presents with a chief complaint of sob (2018 20:34)      HPI:  61 y/o female who presents with significant h/o left lung cancer diagnosed in . Initially treated with chemo and RT followed by surgery, currently on chemo therapy last chemo was 2017. Then in 2015, diagnosed with metastatic cancer of right wrist. Treated with Gamma knife followed by RT. In 2015, diagnosed with metastatic cancer of the brain. Treated with surgery followed with gamma knife and immune therapy. Presents with right wrist tumor. Subsequent xray and PET scan confirmed pathology. s/p exploration resection lesion right wrist on . She recently was found to be signficnatly orthostatic and was started on Midodrine. Afterwards her near syncope has improved. She had a recent echo within last 2 months with normal LV function.        She has developed progressive shortness of breath exacerbated by exertion and associated with weakness and a non-productive cough over the last 2 -3 months. She has had no fevers, chills or sweats. No chest pain/pressure or palpitations. No recent URI/viral type symptoms. Outpatient PET/CT several days ago revealed bilateral large pleural effusions and upper lobe infiltrates. She is now s/p a right thoracentesis. She is feeling better.      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 also s/p xrt  Currently on chemo last chemo was 2017 will completeed end   Hyponatremia  History of seizure: occurred before brain surgery -- last seizure 2015  Lung cancer, left: biopsy  chemo cisplatin/Alimta 2014  radiation   Anxiety  Hypothyroidism  Hyperlipidemia  Hypertension  S/P wrist surgery: x 2 in prior secondary to metastasis  Cancer: [id number] insertion  H/O brain surgery: 7/9/15 - removal of brain tumor--post op received gamma knife followed by RT  History of lung surgery: left wedge 1/15 --- diagnosed with mets to brain and right wrist  History of lung biopsy: left   2014 EBUS  positive lung cancer        SOCIAL HISTORY:  No tobacco, ethanol, or drug abuse.    FAMILY HISTORY:  Family history of heart disease (Father): Mom  Family history of bone cancer (Mother)    No family history of acute MI or sudden cardiac death.    MEDICATIONS  (STANDING):  aspirin enteric coated 81 milliGRAM(s) Oral daily  calcium carbonate 500 mG (Tums) Chewable 1 Tablet(s) Chew daily  diphenhydrAMINE   Capsule 25 milliGRAM(s) Oral at bedtime  docusate sodium 100 milliGRAM(s) Oral three times a day  escitalopram 5 milliGRAM(s) Oral daily  heparin  Injectable 5000 Unit(s) SubCutaneous every 8 hours  levETIRAcetam 250 milliGRAM(s) Oral two times a day  levothyroxine 125 MICROGram(s) Oral daily  midodrine 5 milliGRAM(s) Oral three times a day  pantoprazole    Tablet 40 milliGRAM(s) Oral before breakfast  predniSONE   Tablet 50 milliGRAM(s) Oral daily  simvastatin 10 milliGRAM(s) Oral at bedtime    MEDICATIONS  (PRN):  prochlorperazine   Tablet 10 milliGRAM(s) Oral every 6 hours PRN nausea /vomiting      Allergies    penicillin (Unknown)  Zosyn (Urticaria; Rash; Hives)    Intolerances        Home meds:  Home Medications:  aspirin 81 mg oral delayed release tablet: 1 tab(s) orally once a day (2018 18:56)  Benadryl 25 mg oral tablet: 1 tab(s) orally once a day (at bedtime) (2018 18:56)  Benefiber oral powder for reconstitution: orally once a day (2018 18:56)  Boost supplement: 1 each orally once a day (2018 18:56)  calcium carbonate: 1 tab(s) orally once a day (2018 18:56)  Compazine 10 mg oral tablet: 1 tab(s) orally every 6 hours, As Needed (2018 18:56)  docusate sodium 100 mg oral capsule: 1 cap(s) orally once a day (2018 18:56)  Dulcolax oral tablet: 2 tab(s) orally once a day (at bedtime) (2018 18:56)  Keppra 500 mg oral tablet: 0.5 tab(s) orally 2 times a day (2018 18:56)  levothyroxine 125 mcg (0.125 mg) oral tablet: 1 tab(s) orally once a day (2018 18:56)  Lexapro 10 mg oral tablet: 0.5 tab(s) orally once a day (2018 18:56)  midodrine 5 mg oral tablet: 1 tab(s) orally 3 times a day (2018 18:56)  One-A-Day Women oral tablet: 1 tab(s) orally once a day (2018 18:56)  simvastatin 10 mg oral tablet: 1 tab(s) orally once a day (2018 18:56)        VITAL SIGNS:   Vital Signs Last 24 Hrs  T(C): 36.5 (2018 03:50), Max: 36.9 (2018 14:15)  T(F): 97.7 (2018 03:50), Max: 98.4 (2018 14:15)  HR: 75 (2018 03:50) (72 - 94)  BP: 118/82 (2018 03:50) (73/50 - 124/82)  BP(mean): --  RR: 18 (2018 03:50) (16 - 20)  SpO2: 97% (2018 03:50) (95% - 99%)    I&O's Summary    2018 07:01  -  2018 07:00  --------------------------------------------------------  IN: 125 mL / OUT: 0 mL / NET: 125 mL        On Exam:     Constitutional: NAD, awake and alert   HEENT: Moist Mucous Membranes, Anicteric  Pulmonary: Decreased breath sounds b/ L >r  Cardiovascular: Regular, S1 and S2, No murmurs, rubs, gallops or clicks  Gastrointestinal: Bowel Sounds present, soft, nontender.   Lymph: No peripheral edema. No lymphadenopathy.  Skin: No visible rashes or ulcers.  Psych:  Mood & affect appropriate    LABS: All Labs Reviewed:                        13.2   6.7   )-----------( 480      ( 2018 15:22 )             39.1     2018 15:22    137    |  101    |  17     ----------------------------<  89     4.0     |  20     |  0.81     Ca    10.1       2018 15:22    TPro  7.1    /  Alb  3.3    /  TBili  0.3    /  DBili  x      /  AST  29     /  ALT  12     /  AlkPhos  71     2018 15:22    PT/INR - ( 2018 15:22 )   PT: 11.1 sec;   INR: 1.02 ratio         PTT - ( 2018 15:22 )  PTT:28.9 sec      Blood Culture: Organism --  Gram Stain Blood -- Gram Stain   polymorphonuclear leukocytes seen  No organisms seen by cytocentrifuge  Specimen Source .Body Fluid Pleural Fluid  Culture-Blood --         @ 07:37  TSH: 4.62      RADIOLOGY:  < from: NM PET/CT Oncology FDG WB, Subsq (18 @ 13:40) >    EXAM:  PETCT WB ONC FDG SUBS        PROCEDURE DATE:  2018           INTERPRETATION:  PROCEDURE:  PET/CT WHOLE BODY     RADIOPHARMACEUTICAL:  14.14 mCi F-18, FDG, I.V.    CLINICAL INFORMATION:  63-year-old female referred for follow-up of left  lung cancer on chemotherapy. Assess therapy response. PET/CT is done as   part of the subsequent treatment strategy evaluation.    TECHNIQUE:  Fasting blood sugar measured prior to injection of   radiopharmaceutical was 111 mg/dl. Following intravenous injection of the   radiopharmaceutical and an uptake period of approximately 50 minutes,   FDG-PET/CT was obtained on a GE Discovery 710 from the vertex of the   skull to the toes. Oral contrast was given during the uptake period. CT   was performed during shallow respiration. The CT protocol was optimized   for PET attenuation correction and to provide anatomic detail for   localization of PET abnormalities. The CT protocol was not designed to   produce and cannot replace state-of-the-art diagnostic CT images with   specific imaging protocols for different body parts and indications.   Images were reviewed on a dedicated workstation using multiplanar   reconstruction.    The standardized uptake values (SUV) are normalized to patient body  weight and indicate the highest activity concentration (SUVmax) in a   given disease site. All image numbers refer to the axial image number.    COMPARISON:  PET/CT dated 2017.    OTHER STUDIES USED FOR CORRELATION: MRI of brain dated 2017.    FINDINGS: Minimally FDG-avid postsurgical changes of left   parieto-occipital craniotomy are unchanged.    There is physiologic FDG activity in the brain, head, and neck, including   physiologic muscle activity.    Right chest wall Mediport with catheter tip in the cavoatrial junction is   unchanged.    Peripherally hypermetabolic centrally photopenic difficult to delineate   left lower paraesophageal lymph node is not significantly changed in size   or metabolism (SUV 6.0; image 125; previous SUV 8.7). Remainder of   mediastinal and bilateral hilar regions demonstrate physiologic FDG   activity.    Non FDG-avid right axillary lymph node measuring 2.3 x 1.7 cm is   unchanged (image 117). No enlarged or FDG-avid left axillary lymph node.   No abnormal FDG activity in either breast.    As noted in prior exams, patient is status post left upper lobe wedge   resection. There are new mildly FDG-avid bilateral upper lobe parenchymal   opacities which may reflect multifocal pneumonia and/or pulmonary edema.   For reference, right upper lobe opacity demonstrates SUV 2.9 (image 118).   Mildly FDG-avid post radiation therapy changes in left paramediastinal   region are not significantly changed. Large bilateral pleural effusions   are increased in size since 2017. There is compressive atelectasis   of bilateral lower lobes.    Physiologic FDG activity seen in the liver (SUV mean 2.3; previously   2.4), spleen, bilateral kidneys, right ureter, and urinary bladder. No   abnormal FDG activity in the adrenal glands. Cholelithiasis.    Hypermetabolic focus associated with orthopedic hardware in distal right   radius, significantly less conspicuous on the uncorrected image, and   decreased in metabolism as compared to prior study, may in part be   related to artifact from orthopedic hardware (SUV 6.8; image 217;   previous SUV 16.1). As on prior exam, streak artifact from orthopedic   hardware limits evaluation. Remainder of the osseous structures   demonstrate physiologic FDG activity.    IMPRESSION:   Whole-body PET/CT scan demonstrates:    1. Mildly FDG-avid bilateral upper lobe pulmonary opacities, new as   compared to PET/CT dated 2017, may reflect multifocal pneumonia   and/or pulmonary edema. Large bilateral pleural effusions are increased   in size. There is adjacent compressive atelectasis of the lower lobes.   Dr. Jourdan Olivo informed Dr. Mendiola of these findings at 1:52 PM on   2018.    2. Peripherally hypermetabolic, centrally photopenic difficult to   delineate left lower paraesophageal lymph node is not significantly   changed as compared to PET/CT dated 2017.    3. Hypermetabolic focus adjacent to orthopedic hardware in right radius,   significantly less conspicuous on the uncorrected image, and decreased in   intensity as compared to prior study, may in part be related to artifact.     4. No new lesion.                   LARA MARCELINO M.D., NUCLEAR MEDICINE ATTENDING  This document has been electronically signed. 2018  5:30PM                < end of copied text >

## 2018-02-23 NOTE — PROGRESS NOTE ADULT - SUBJECTIVE AND OBJECTIVE BOX
[facility] - [facility]     PROGRESS NOTE    CHIEF COMPLAINT: hypoxemia - resolved; pleural effusions; pneumonitis; metastatic lung cancer    INTERVAL HISTORY: walking around the [place] without shortness of breath; much less chest pressure; stronger; no fevers, chills or sweats; no cough, sputum production, chest congestion or wheeze; no fevers, chills or sweats; hypotension better on midodrine    REVIEW OF SYSTEMS:  Constitutional: As per interval history  HEENT: Within normal limits  CV: As per interval history  Resp: As per interval history  GI: Within normal limits   : Within normal limits  Musculoskeletal: Within normal limits  Skin: Within normal limits  Neurological: Within normal limits  Psychiatric: Within normal limits  Endocrine: Within normal limits  Hematologic/Lymphatic: Within normal limits  Allergic/Immunologic: Within normal limits      MEDICATIONS:     Pulmonary "      Anti-microbials:      Cardiovascular:  midodrine 5 milliGRAM(s) Oral three times a day      Other:  aspirin enteric coated 81 milliGRAM(s) Oral daily  calcium carbonate 500 mG (Tums) Chewable 1 Tablet(s) Chew daily  diphenhydrAMINE   Capsule 25 milliGRAM(s) Oral at bedtime  docusate sodium 100 milliGRAM(s) Oral three times a day  escitalopram 5 milliGRAM(s) Oral daily  heparin  Injectable 5000 Unit(s) SubCutaneous every 8 hours  levETIRAcetam 250 milliGRAM(s) Oral two times a day  levothyroxine 125 MICROGram(s) Oral daily  pantoprazole    Tablet 40 milliGRAM(s) Oral before breakfast  predniSONE   Tablet 50 milliGRAM(s) Oral daily  prochlorperazine   Tablet 10 milliGRAM(s) Oral every 6 hours PRN  simvastatin 10 milliGRAM(s) Oral at bedtime        OBJECTIVE:        I&O's Detail    2018 07:01  -  2018 07:00  --------------------------------------------------------  IN:    Oral Fluid: 125 mL  Total IN: 125 mL    OUT:  Total OUT: 0 mL    Total NET: 125 mL    Daily Height in cm: 167.64 (2018 01:32)    Daily Weight in k.2 (2018 01:32)    PHYSICAL EXAM:       ICU Vital Signs Last 24 Hrs  T(C): 36.5 (2018 03:50), Max: 36.9 (2018 14:15)  T(F): 97.7 (2018 03:50), Max: 98.4 (2018 14:15)  HR: 75 (2018 03:50) (72 - 94)  BP: 118/82 (2018 03:50) (73/50 - 124/82)  BP(mean): --  ABP: --  ABP(mean): --  RR: 18 (2018 03:50) (16 - 20)  SpO2: 97% (2018 03:50) (95% - 99%) on room air     General: Awake. Alert. Cooperative. No distress. Appears stated age. Chronically ill appearing	  HEENT:   Atraumatic. Bitemporal wasting. Anicteric. Normal oral mucosa, PERRL, EOMI  Neck: Supple. Trachea midline. Thyroid without enlargement/tenderness/nodules. No carotid bruit. No JVD. Loss of bilateral supraclavicular fat pads  Cardiovascular: Regular rate and rhythm. S1 S2 normal. II/VI systolic murmur  Respiratory: Respirations unlabored. Decreased breath sounds on the left ~ 1/2 up with dullness to percussion. Kyphosis  Abdomen: Soft. Non-tender. Non-distended. No organomegaly. No masses. Normal bowel sounds	  Extremities: Warm to touch. No clubbing or cyanosis. No pedal edema.  Pulses: 2+ peripheral pulses all extremities	  Skin: Normal skin color. No rashes or lesions. No ecchymoses, No cyanosis. Warm to touch  Lymph Nodes: Cervical, supraclavicular and axillary nodes normal  Neurological: Motor and sensory examination equal and normal. A and O x 3  Psychiatry: Appropriate mood and affect.      LABS:                        13.2   6.7   )-----------( 480      ( 2018 15:22 )             39.1         137  |  101  |  17  ----------------------------<  89  4.0   |  20<L>  |  0.81    Ca      10.1            TPro  7.1  /  Alb  3.3  /  TBili  0.3  /  DBili  x   /  AST  29  /  ALT  12  /  AlkPhos  71      PT/INR - ( 2018 15:22 )   PT: 11.1 sec;   INR: 1.02 ratio       PTT - ( 2018 15:22 )  PTT:28.9 sec    Venous Blood Gas:   @ 21:57  7.42/34/90/22/97  VBG Lactate: 2.4    Protein Total, Fluid (18 @ 18:43)    Protein Total, Fluid: 4.6: Reference Ranges have NOT been established for analytes in body fluids  because of variability in body fluid composition.  The  has not determined the efficacy of this test when  performed on fluid specimens. The performance characteristics of this  test were determined by [employer]. g/dL    pH, Fluid (18 @ 18:43)    pH, Fluid: 7.59    Lactate Dehydrogenase, Fluid (18 @ 18:43)    Lactate Dehydrogenase, Fluid: 136: Reference Ranges have NOT been established for analytes in body fluids  because of variability in body fluid composition.  The  has not determined the efficacy of this test when  performed on fluid specimens. The performance characteristics of this  test were determined by [employer]. U/L    Glucose, Fluid (18 @ 18:43)    Glucose, Fluid: 106: Reference Ranges have NOT  been established for analytes in body fluids  because of variability in body fluid composition.  The  has not determined the efficacy of this test when  performed on fluid specimens. The performance characteristics of this  test were determined by [employer]. mg/dL    Fluid Source: PLEURAL    MICROBIOLOGY:     Culture - Body Fluid with Gram Stain (18 @ 22:17)    Gram Stain:   polymorphonuclear leukocytes seen  No organisms seen by cytocentrifuge    Specimen Source: .Body Fluid Pleural Fluid      RADIOLOGY:  [x] Chest radiographs reviewed and interpreted by me    < from: NM PET/CT Oncology FDG WB, Subsq (18 @ 13:40) >    EXAM:  PETCT WB ONC FDG SUBS        PROCEDURE DATE:  2018           INTERPRETATION:  PROCEDURE:  PET/CT WHOLE BODY     RADIOPHARMACEUTICAL:  14.14 mCi F-18, FDG, I.V.    CLINICAL INFORMATION:  63-year-old female referred for follow-up of left  lung cancer on chemotherapy. Assess therapy response. PET/CT is done as   part of the subsequent treatment strategy evaluation.    TECHNIQUE:  Fasting blood sugar measured prior to injection of   radiopharmaceutical was 111 mg/dl. Following intravenous injection of the   radiopharmaceutical and an uptake period of approximately 50 minutes,   FDG-PET/CT was obtained on a [employer] Discovery 710 from the vertex of the   skull to the toes. Oral contrast was given during the uptake period. CT   was performed during shallow respiration. The CT protocol was optimized   for PET attenuation correction and to provide anatomic detail for   localization of PET abnormalities. The CT protocol was not designed to   produce and cannot replace state-of-the-art diagnostic CT images with   specific imaging protocols for different body parts and indications.   Images were reviewed on a dedicated workstation using multiplanar   reconstruction.    The standardized uptake values (SUV) are normalized to patient body  weight and indicate the highest activity concentration (SUVmax) in a   given disease site. All image numbers refer to the axial image number.    COMPARISON:  PET/CT dated 2017.    OTHER STUDIES USED FOR CORRELATION: MRI of brain dated 2017.    FINDINGS: Minimally FDG-avid postsurgical changes of left   parieto-occipital craniotomy are unchanged.    There is physiologic FDG activity in the brain, head, and neck, including   physiologic muscle activity.    Right chest wall Mediport with catheter tip in the cavoatrial junction is   unchanged.    Peripherally hypermetabolic centrally photopenic difficult to delineate   left lower paraesophageal lymph node is not significantly changed in size   or metabolism (SUV 6.0; image 125; previous SUV 8.7). Remainder of   mediastinal and bilateral hilar regions demonstrate physiologic FDG   activity.    Non FDG-avid right axillary lymph node measuring 2.3 x 1.7 cm is   unchanged (image 117). No enlarged or FDG-avid left axillary lymph node.   No abnormal FDG activity in either breast.    As noted in prior exams, patient is status post left upper lobe wedge   resection. There are new mildly FDG-avid bilateral upper lobe parenchymal   opacities which may reflect multifocal pneumonia and/or pulmonary edema.   For reference, right upper lobe opacity demonstrates SUV 2.9 (image 118).   Mildly FDG-avid post radiation therapy changes in left paramediastinal   region are not significantly changed. Large bilateral pleural effusions   are increased in size since 2017. There is compressive atelectasis   of bilateral lower lobes.    Physiologic FDG activity seen in the liver (SUV mean 2.3; previously   2.4), spleen, bilateral kidneys, right ureter, and urinary bladder. No   abnormal FDG activity in the adrenal glands. Cholelithiasis.    Hypermetabolic focus associated with orthopedic hardware in distal right   radius, significantly less conspicuous on the uncorrected image, and   decreased in metabolism as compared to prior study, may in part be   related to artifact from orthopedic hardware (SUV 6.8; image 217;   previous SUV 16.1). As on prior exam, streak artifact from orthopedic   hardware limits evaluation. Remainder of the osseous structures   demonstrate physiologic FDG activity.    IMPRESSION:   Whole-body PET/CT scan demonstrates:    1. Mildly FDG-avid bilateral upper lobe pulmonary opacities, new as   compared to PET/CT dated 2017, may reflect multifocal pneumonia   and/or pulmonary edema. Large bilateral pleural effusions are increased   in size. There is adjacent compressive atelectasis of the lower lobes.   Dr. Jourdan Olivo informed Dr. Mendiola of these findings at 1:52 PM on   2018.    2. Peripherally hypermetabolic, centrally photopenic difficult to   delineate left lower paraesophageal lymph node is not significantly   changed as compared to PET/CT dated 2017.    3. Hypermetabolic focus adjacent to orthopedic hardware in right radius,   significantly less conspicuous on the uncorrected image, and decreased in   intensity as compared to prior study, may in part be related to artifact.     4. No new lesion.    LARA MARCELINO M.D., NUCLEAR MEDICINE ATTENDING  This document has been electronically signed. 2018  5:30PM      < end of copied text >  ---------------------------------------------------------------------------------------------------------    < from: MR Head w/wo IV Cont (..17 @ 15:17) >    EXAM:  MR BRAIN WAW IC        PROCEDURE DATE:  2017           INTERPRETATION:  HISTORY: History of lung cancer and brain mass status   post surgery. Follow-up. Status post radiation therapy. Brain tumor   follow-up.    Description: MRI of thebrain with and without gadolinium contrast was   performed.    COMPARISON: Brain MRI studies 2017, 2017.    Sagittal T1, axial T1, T2, FLAIR, SWI, and diffusion-weighted series were   obtained before contrast. After intravenous gadoliniumcontrast   administration, sagittal, coronal, and axial T1 postcontrast series were   obtained.    5 cc intravenous Gadovist gadolinium contrast was administered, 5 cc   contrast was discarded.    Left parietal craniotomy changes are again noted. Thesmall encephalocele   at the craniotomy margins is stable in appearance.    Evolving postoperative changes are again noted in the left parietal lobe.   The nonspecific enhancement at the margins of the surgical cavity is   stable. No new areas of enhancement are present. Nonspecific T2 and FLAIR   signal changes surrounding the surgical cavity are stable.    No new enhancing intracranial lesions are present. There is no evidence   for acute infarct, acute hemorrhage, or hydrocephalus. 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    The right internal carotid artery flow void is smaller compared to   contralateral side most consistent with a stenosis. Consider correlation   with brain and neck MRA imaging follow-up if clinically warranted.    IMPRESSION:    The nonspecific enhancement at the margins of the left parietal surgical   cavity is stable.    SEE BEASLEY M.D., ATTENDING RADIOLOGIST  This document has been electronically signed. Dec 13 2017  3:47PM      < end of copied text >    --------------------------------------------------------------------------------------------------------- [facility] - [facility]     PROGRESS NOTE    CHIEF COMPLAINT: hypoxemia - resolved; pleural effusions; pneumonitis; metastatic lung cancer    INTERVAL HISTORY: [place] without shortness of breath; much less chest pressure; stronger; no fevers, chills or sweats; no cough, sputum production, chest congestion or wheeze; no fevers, chills or sweats; hypotension better on midodrine    REVIEW OF SYSTEMS:  Constitutional: As per interval history  HEENT: Within normal limits  CV: As per interval history  Resp: As per interval history  GI: Within normal limits   : Within normal limits  Musculoskeletal: Within normal limits  Skin: Within normal limits  Neurological: Within normal limits  Psychiatric: Within normal limits  Endocrine: Within normal limits  Hematologic/Lymphatic: Within normal limits  Allergic/Immunologic: Within normal limits      MEDICATIONS:     Pulmonary "      Anti-microbials:      Cardiovascular:  midodrine 5 milliGRAM(s) Oral three times a day      Other:  aspirin enteric coated 81 milliGRAM(s) Oral daily  calcium carbonate 500 mG (Tums) Chewable 1 Tablet(s) Chew daily  diphenhydrAMINE   Capsule 25 milliGRAM(s) Oral at bedtime  docusate sodium 100 milliGRAM(s) Oral three times a day  escitalopram 5 milliGRAM(s) Oral daily  heparin  Injectable 5000 Unit(s) SubCutaneous every 8 hours  levETIRAcetam 250 milliGRAM(s) Oral two times a day  levothyroxine 125 MICROGram(s) Oral daily  pantoprazole    Tablet 40 milliGRAM(s) Oral before breakfast  predniSONE   Tablet 50 milliGRAM(s) Oral daily  prochlorperazine   Tablet 10 milliGRAM(s) Oral every 6 hours PRN  simvastatin 10 milliGRAM(s) Oral at bedtime        OBJECTIVE:        I&O's Detail    2018 07:01  -  2018 07:00  --------------------------------------------------------  IN:    Oral Fluid: 125 mL  Total IN: 125 mL    OUT:  Total OUT: 0 mL    Total NET: 125 mL    Daily Height in cm: 167.64 (2018 01:32)    Daily Weight in k.2 (2018 01:32)    PHYSICAL EXAM:       ICU Vital Signs Last 24 Hrs  T(C): 36.5 (2018 03:50), Max: 36.9 (2018 14:15)  T(F): 97.7 (2018 03:50), Max: 98.4 (2018 14:15)  HR: 75 (2018 03:50) (72 - 94)  BP: 118/82 (2018 03:50) (73/50 - 124/82)  BP(mean): --  ABP: --  ABP(mean): --  RR: 18 (2018 03:50) (16 - 20)  SpO2: 97% (2018 03:50) (95% - 99%) on room air     General: Awake. Alert. Cooperative. No distress. Appears stated age. Chronically ill appearing	  HEENT:   Atraumatic. Bitemporal wasting. Anicteric. Normal oral mucosa, PERRL, EOMI  Neck: Supple. Trachea midline. Thyroid without enlargement/tenderness/nodules. No carotid bruit. No JVD. Loss of bilateral supraclavicular fat pads  Cardiovascular: Regular rate and rhythm. S1 S2 normal. II/VI systolic murmur  Respiratory: Respirations unlabored. Decreased breath sounds on the left ~ 1/2 up with dullness to percussion. Kyphosis  Abdomen: Soft. Non-tender. Non-distended. No organomegaly. No masses. Normal bowel sounds	  Extremities: Warm to touch. No clubbing or cyanosis. No pedal edema.  Pulses: 2+ peripheral pulses all extremities	  Skin: Normal skin color. No rashes or lesions. No ecchymoses, No cyanosis. Warm to touch  Lymph Nodes: Cervical, supraclavicular and axillary nodes normal  Neurological: Motor and sensory examination equal and normal. A and O x 3  Psychiatry: Appropriate mood and affect.      LABS:                        13.2   6.7   )-----------( 480      ( 2018 15:22 )             39.1         137  |  101  |  17  ----------------------------<  89  4.0   |  20<L>  |  0.81    Ca      10.1            TPro  7.1  /  Alb  3.3  /  TBili  0.3  /  DBili  x   /  AST  29  /  ALT  12  /  AlkPhos  71      PT/INR - ( 2018 15:22 )   PT: 11.1 sec;   INR: 1.02 ratio       PTT - ( 2018 15:22 )  PTT:28.9 sec    Venous Blood Gas:   @ 21:57  7.42/34/90/22/97  VBG Lactate: 2.4    Protein Total, Fluid (18 @ 18:43)    Protein Total, Fluid: 4.6: Reference Ranges have NOT been established for analytes in body fluids  because of variability in body fluid composition.  The  has not determined the efficacy of this test when  performed on fluid specimens. The performance characteristics of this  test were determined by [facility] [employer]. g/dL    pH, Fluid (18 @ 18:43)    pH, Fluid: 7.59    Lactate Dehydrogenase, Fluid (18 @ 18:43)    Lactate Dehydrogenase, Fluid: 136: Reference Ranges have NOT been established for analytes in body fluids  because of variability in body fluid composition.  The  has not determined the efficacy of this test when  performed on fluid specimens. The performance characteristics of this  test were determined by [facility] [employer]. U/L    Glucose, Fluid (18 @ 18:43)    Glucose, Fluid: 106: Reference Ranges have NOT  been established for analytes in body fluids  because of variability in body fluid composition.  The  has not determined the efficacy of this test when  performed on fluid specimens. The performance characteristics of this  test were determined by [facility]. mg/dL    Fluid Source: PLEURAL    MICROBIOLOGY:     Culture - Body Fluid with Gram Stain (18 @ 22:17)    Gram Stain:   polymorphonuclear leukocytes seen  No organisms seen by cytocentrifuge    Specimen Source: .Body Fluid Pleural Fluid      RADIOLOGY:  [x] Chest radiographs reviewed and interpreted by me    < from: Xray Chest 1 View AP/PA (18 @ 17:59) >    EXAM:  XR CHEST AP OR PA 1V                            PROCEDURE DATE:  2018        INTERPRETATION:  CLINICAL INFORMATION: Rule out pneumothorax status post   right thoracentesis.    COMPARISON:  PET CT dated 2019. Chest x-ray dated 2017.    TECHNIQUE:   AP portable chest xray.    FINDINGS:   Right chest wall Mediport with tip in the cavoatrial junction.    Clips in the UPPER lung are unchanged. Bilateral upper lung opacities.    No pneumothorax.    Left pleural effusion. Noright pleural effusion.    Right upper lung consolidation seen on prior PET/CT performed 3 days   prior.    The cardiac silhouette is not accurately assessed in this projection.    IMPRESSION:      1.  No pneumothorax.  2.  No change in the bilateralopacities which are of uncertain origin.  3.  Left pleural effusion.      CHRISTINA SMITH M.D., RADIOLOGY RESIDENT  This document has been electronically signed.  LUIS OCONNOR M.D., ATTENDING RADIOLOGIST  This document has been electronicallysigned. 2018 10:48AM      < end of copied text >  ---------------------------------------------------------------------------------------------------------  < from: NM PET/CT Oncology FDG WB, Subsq (18 @ 13:40) >    EXAM:  PETCT WB ONC FDG SUBS        PROCEDURE DATE:  2018           INTERPRETATION:  PROCEDURE:  PET/CT WHOLE BODY     RADIOPHARMACEUTICAL:  14.14 mCi F-18, FDG, I.V.    CLINICAL INFORMATION:  63-year-old female referred for follow-up of left  lung cancer on chemotherapy. Assess therapy response. PET/CT is done as   part of the subsequent treatment strategy evaluation.    TECHNIQUE:  Fasting blood sugar measured prior to injection of   radiopharmaceutical was 111 mg/dl. Following intravenous injection of the   radiopharmaceutical and an uptake period of approximately 50 minutes,   FDG-PET/CT was obtained on a [employer] 710 from the vertex of the   skull to the toes. Oral contrast was given during the uptake period. CT   was performed during shallow respiration. The CT protocol was optimized   for PET attenuation correction and to provide anatomic detail for   localization of PET abnormalities. The CT protocol was not designed to   produce and cannot replace state-of-the-art diagnostic CT images with   specific imaging protocols for different body parts and indications.   Images were reviewed on a dedicated workstation using multiplanar   reconstruction.    The standardized uptake values (SUV) are normalized to patient body  weight and indicate the highest activity concentration (SUVmax) in a   given disease site. All image numbers refer to the axial image number.    COMPARISON:  PET/CT dated 2017.    OTHER STUDIES USED FOR CORRELATION: MRI of brain dated 2017.    FINDINGS: Minimally FDG-avid postsurgical changes of left   parieto-occipital craniotomy are unchanged.    There is physiologic FDG activity in the brain, head, and neck, including   physiologic muscle activity.    Right chest wall Mediport with catheter tip in the cavoatrial junction is   unchanged.    Peripherally hypermetabolic centrally photopenic difficult to delineate   left lower paraesophageal lymph node is not significantly changed in size   or metabolism (SUV 6.0; image 125; previous SUV 8.7). Remainder of   mediastinal and bilateral hilar regions demonstrate physiologic FDG   activity.    Non FDG-avid right axillary lymph node measuring 2.3 x 1.7 cm is   unchanged (image 117). No enlarged or FDG-avid left axillary lymph node.   No abnormal FDG activity in either breast.    As noted in prior exams, patient is status post left upper lobe wedge   resection. There are new mildly FDG-avid bilateral upper lobe parenchymal   opacities which may reflect multifocal pneumonia and/or pulmonary edema.   For reference, right upper lobe opacity demonstrates SUV 2.9 (image 118).   Mildly FDG-avid post radiation therapy changes in left paramediastinal   region are not significantly changed. Large bilateral pleural effusions   are increased in size since 2017. There is compressive atelectasis   of bilateral lower lobes.    Physiologic FDG activity seen in the liver (SUV mean 2.3; previously   2.4), spleen, bilateral kidneys, right ureter, and urinary bladder. No   abnormal FDG activity in the adrenal glands. Cholelithiasis.    Hypermetabolic focus associated with orthopedic hardware in distal right   radius, significantly less conspicuous on the uncorrected image, and   decreased in metabolism as compared to prior study, may in part be   related to artifact from orthopedic hardware (SUV 6.8; image 217;   previous SUV 16.1). As on prior exam, streak artifact from orthopedic   hardware limits evaluation. Remainder of the osseous structures   demonstrate physiologic FDG activity.    IMPRESSION:   Whole-body PET/CT scan demonstrates:    1. Mildly FDG-avid bilateral upper lobe pulmonary opacities, new as   compared to PET/CT dated 2017, may reflect multifocal pneumonia   and/or pulmonary edema. Large bilateral pleural effusions are increased   in size. There is adjacent compressive atelectasis of the lower lobes.   Dr. Jourdan Olivo informed Dr. Mendiola of these findings at 1:52 PM on   2018.    2. Peripherally hypermetabolic, centrally photopenic difficult to   delineate left lower paraesophageal lymph node is not significantly   changed as compared to PET/CT dated 2017.    3. Hypermetabolic focus adjacent to orthopedic hardware in right radius,   significantly less conspicuous on the uncorrected image, and decreased in   intensity as compared to prior study, may in part be related to artifact.     4. No new lesion.    LARA MARCELINO M.D., NUCLEAR MEDICINE ATTENDING  This document has been electronically signed. 2018  5:30PM      < end of copied text >  ---------------------------------------------------------------------------------------------------------    < from: MR Head w/wo IV Cont (12.13.17 @ 15:17) >    EXAM:  MR BRAIN WAW IC        PROCEDURE DATE:  2017           INTERPRETATION:  HISTORY: History of lung cancer and brain mass status   post surgery. Follow-up. Status post radiation therapy. Brain tumor   follow-up.    Description: MRI of thebrain with and without gadolinium contrast was   performed.    COMPARISON: Brain MRI studies 2017, 2017.    Sagittal T1, axial T1, T2, FLAIR, SWI, and diffusion-weighted series were   obtained before contrast. After intravenous gadoliniumcontrast   administration, sagittal, coronal, and axial T1 postcontrast series were   obtained.    5 cc intravenous Gadovist gadolinium contrast was administered, 5 cc   contrast was discarded.    Left parietal craniotomy changes are again noted. Thesmall encephalocele   at the craniotomy margins is stable in appearance.    Evolving postoperative changes are again noted in the left parietal lobe.   The nonspecific enhancement at the margins of the surgical cavity is   stable. No new areas of enhancement are present. Nonspecific T2 and FLAIR   signal changes surrounding the surgical cavity are stable.    No new enhancing intracranial lesions are present. There is no evidence   for acute infarct, acute hemorrhage, or hydrocephalus. 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    The right internal carotid artery flow void is smaller compared to   contralateral side most consistent with a stenosis. Consider correlation   with brain and neck MRA imaging follow-up if clinically warranted.    IMPRESSION:    The nonspecific enhancement at the margins of the left parietal surgical   cavity is stable.    SEE BEASLEY M.D., ATTENDING RADIOLOGIST  This document has been electronically signed. Dec 13 2017  3:47PM      < end of copied text >    ---------------------------------------------------------------------------------------------------------

## 2018-02-23 NOTE — DISCHARGE NOTE ADULT - CARE PLAN
Principal Discharge DX:	Pleural effusion, bilateral  Goal:	no sequela  Assessment and plan of treatment:	bilateral effusions drained  follow up with the pulmonologist  seek medical assistance if symptoms return

## 2018-02-23 NOTE — PROGRESS NOTE ADULT - ASSESSMENT
ASSESSMENT    progressive shortness of breath over the last several months while on Taxotere - recent PET/CT reveals new bilateral large pleural effusions without evidence of metastatic disease to the pleura - there are also new mildly FDG avid bilateral upper lobe pulmonary opacities - these findings are likely all related to chemo toxicity with pneumonitis (pulmonary edema would not be FDG avid) and capillary leak causing pleural effusions     PLAN/RECOMMENDATIONS    oxygen supplementation to keep saturation greater than 92%  s/p right sided thoracentesis - 1500cc - borderline exudate - cultures and cytology pending  left sided thoracentesis today  prednisone 50mg daily  need to be cautious using corticosteroids until we "rule out" underlying infection - pan-culture - RVP - (?) BAL  continue anti-convulsant following intervention on brain mets  diligent DVT prophylaxis - SQ heparin  lexapro  synthroid    Thank you for the courtesy of this referral. Plan of care discussed with the patient and her  at bedside and Brady Mendiola and Ramón  No pulmonary contraindication to discharge if thoracentesis is uncomplicated  Patient will be seen next week in our office next week by Dr. Deedee Lai.    Henrique Hanks MD, [facility] - [phone]  Pulmonary Medicine
1) NSCLC- hold. rx. Has been on taxotere.  - will consider change to an alternative regimen as outpatient.  2) Pulm- Pleural effusion and possible pneumonitis - Possibly related to treatment with taxotere.  s/p throrocentesis. for contralateral (left) drainage today  - Case d/w Dr. Hanks. For rx with prednisone.  - Outpatient f/u with pulmonary    No heme onc or pulmonary objection to DC, if ok with medical team.
61 y/o female with pmh significant of lung cancer diagnosed in 2014. Initially treated with chemo and RT followed by surgery, Then in June 2015, diagnosed with metastatic cancer of right wrist. Treated with Gamma knife followed by RT. In July 2015, diagnosed with metastatic cancer of the brain. Treated with surgery followed with gamma knife and immune therapy. Presents with right wrist tumor. Subsequent xray and PET scan confirmed pathology. s/p exploration resection lesion right wrist on 2016. She has developed progressive shortness of breath exacerbated by exertion and associated with weakness and a non-productive cough over the last 2 -3 months. She has had no fevers, chills or sweats. No chest pain/pressure or palpitations. No recent URI/viral type symptoms. Outpatient PET/CT several days ago revealed bilateral large pleural effusions and upper lobe infiltrates. She is sent to the ER for further evaluation and treatment.      1- dyspnea : sec to B pleural effusion  .  progressive shortness of breath over the last several months while on Taxotere - recent PET/CT reveals new bilateral large pleural effusions without evidence of metastatic disease to the pleura - there are also new mildly FDG avid bilateral upper lobe pulmonary opacities - these findings are likely all related to chemo toxicity with pneumonitis (pulmonary edema would not be FDG avid) and capillary leak causing pleural effusions.  Pt s/p 1500 cc removal of fluid on R .. fluid anlaysis with protien >0.5 LDH not significantly elvated   cont O2 , f/u cultures /cytology ,  RVP: negative   cont steroids , FS and PPI   d/w with  and  : plan for thoracentesis on L today ..   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 Mendiola .  cont keppra   4- hx of hypotension on middrine..cont end monitor BP   5- cachexia : sec to lung ca   nutrition consult     DVT Prophy

## 2018-02-23 NOTE — PROGRESS NOTE ADULT - SUBJECTIVE AND OBJECTIVE BOX
Patient is a 63y old  Female who presents with a chief complaint of sob (2018 20:34)                                                               INTERVAL HPI/OVERNIGHT EVENTS:    REVIEW OF SYSTEMS:     CONSTITUTIONAL: No weakness, fevers or chills  RESPIRATORY: No cough, wheezing,  improving  shortness of breath  CARDIOVASCULAR: No chest pain or palpitations  GASTROINTESTINAL: No abdominal pain  . No nausea, vomiting  GENITOURINARY: No dysuria, frequency or hematuria  NEUROLOGICAL: No numbness or weakness                                                                                                                                                                                                                                                                             Medications:  MEDICATIONS  (STANDING):  aspirin enteric coated 81 milliGRAM(s) Oral daily  calcium carbonate 500 mG (Tums) Chewable 1 Tablet(s) Chew daily  diphenhydrAMINE   Capsule 25 milliGRAM(s) Oral at bedtime  docusate sodium 100 milliGRAM(s) Oral three times a day  escitalopram 5 milliGRAM(s) Oral daily  heparin  Injectable 5000 Unit(s) SubCutaneous every 8 hours  levETIRAcetam 250 milliGRAM(s) Oral two times a day  levothyroxine 125 MICROGram(s) Oral daily  midodrine 5 milliGRAM(s) Oral three times a day  pantoprazole    Tablet 40 milliGRAM(s) Oral before breakfast  predniSONE   Tablet 50 milliGRAM(s) Oral daily  simvastatin 10 milliGRAM(s) Oral at bedtime    MEDICATIONS  (PRN):  prochlorperazine   Tablet 10 milliGRAM(s) Oral every 6 hours PRN nausea /vomiting       Allergies    penicillin (Unknown)  Zosyn (Urticaria; Rash; Hives)    Intolerances      Vital Signs Last 24 Hrs  T(C): 35.9 (2018 11:37), Max: 36.9 (2018 14:15)  T(F): 96.6 (2018 11:37), Max: 98.4 (2018 14:15)  HR: 75 (2018 11:37) (72 - 94)  BP: 126/81 (2018 11:37) (73/50 - 126/81)  BP(mean): --  RR: 18 (2018 11:37) (16 - 20)  SpO2: 98% (2018 11:37) (95% - 99%)  CAPILLARY BLOOD GLUCOSE          - @ 07:01  -  - @ 07:00  --------------------------------------------------------  IN: 125 mL / OUT: 0 mL / NET: 125 mL      Physical Exam:    Daily Height in cm: 167.64 (2018 01:32)    Daily Weight in k.2 (2018 01:32)  General:   cachetic  HEENT:  Nonicteric, PERRLA  CV:  RRR, S1S2   Lungs:  CTA B/L, no wheezes, rales, rhonchi  Abdomen:  Soft, non-tender, no distended, positive BS  Extremities:  2+ pulses, no c/c, no edema  Skin:  Warm and dry, no rashes  :  No carcamo  Neuro:  AAOx3, non-focal, grossly intact                                                                                                                                                                                                                                                                                                LABS:                               13.2   6.7   )-----------( 480      ( 2018 15:22 )             39.1                      02-22    137  |  101  |  17  ----------------------------<  89  4.0   |  20<L>  |  0.81    Ca    10.1      2018 15:22    TPro  7.1  /  Alb  3.3  /  TBili  0.3  /  DBili  x   /  AST  29  /  ALT  12  /  AlkPhos  71  -22

## 2018-02-23 NOTE — CONSULT NOTE ADULT - ASSESSMENT
62 F w hx of met Ca on chemo with orthostasis, nl LV now with pleural effusions.   - i doubt pleural effusions are cardiac in origin. Agree with thoracentesis  - likely from chemo. f/u with heme/onc  - orthostasis has improved. cont midodrine  - Monitor and replete electrolytes. Keep K>4.0 and Mg>2.0.   - Further cardiac workup will depend on clinical course.   - All other workup per primary team. Will followup.

## 2018-02-23 NOTE — DISCHARGE NOTE ADULT - PATIENT PORTAL LINK FT
You can access the [email or web address] Patient Portal, offered by [facility], by registering with the following website: [email or web address]

## 2018-02-23 NOTE — PROGRESS NOTE ADULT - SUBJECTIVE AND OBJECTIVE BOX
AYAKA DENT  MRN-[id number]    Patient is a 63y old  Female who presents with a chief complaint of sob (22 Feb 2018 20:34)      Review of System      General:	Denies fatigue, fevers, chills, sweats, decreased appetite.    Skin/Breast: denies pruritis, rash  	  Ophthalmologic: no change in vision or blurring  	  HEENT	Denies dry mouth, oral sores, dysphagia,  change in hearing.    Respiratory and Thorax:  + cough. Sob has improved.  	  Cardiovascular:	no cp , palp, orthopnea    Gastrointestinal:	no n/v/d constipation    Genitourinary:	no dysuria of frequency, no hematuria, no flank pain    Musculoskeletal:	no bone or joint pain. no muscle aches.     Neurological:	no change in sensory or motor function. no headache. no weakness.     Psychiatric:	no depression, no anxiety, insomnia.     Hematology/Lymphatics:	no bleeding or bruising        Current Meds  MEDICATIONS  (STANDING):  aspirin enteric coated 81 milliGRAM(s) Oral daily  calcium carbonate 500 mG (Tums) Chewable 1 Tablet(s) Chew daily  diphenhydrAMINE   Capsule 25 milliGRAM(s) Oral at bedtime  docusate sodium 100 milliGRAM(s) Oral three times a day  escitalopram 5 milliGRAM(s) Oral daily  heparin  Injectable 5000 Unit(s) SubCutaneous every 8 hours  levETIRAcetam 250 milliGRAM(s) Oral two times a day  levothyroxine 125 MICROGram(s) Oral daily  midodrine 5 milliGRAM(s) Oral three times a day  pantoprazole    Tablet 40 milliGRAM(s) Oral before breakfast  predniSONE   Tablet 50 milliGRAM(s) Oral daily  simvastatin 10 milliGRAM(s) Oral at bedtime    MEDICATIONS  (PRN):  prochlorperazine   Tablet 10 milliGRAM(s) Oral every 6 hours PRN nausea /vomiting      Vitals  Vital Signs Last 24 Hrs  T(C): 36.5 (23 Feb 2018 03:50), Max: 36.9 (22 Feb 2018 14:15)  T(F): 97.7 (23 Feb 2018 03:50), Max: 98.4 (22 Feb 2018 14:15)  HR: 75 (23 Feb 2018 03:50) (72 - 94)  BP: 118/82 (23 Feb 2018 03:50) (73/50 - 124/82)  BP(mean): --  RR: 18 (23 Feb 2018 03:50) (16 - 20)  SpO2: 97% (23 Feb 2018 03:50) (95% - 99%)    Physical Exam    Constitutional: NAD    Eyes: PERRLA EOMI, anicteric sclera    Heent :No oral sores, no pharyngeal injection. moist mucosa.    Neck: supple, no jvd, no LAD    Respiratory: decreased bs    Cardiovascular: s1s2, no m/g/r    Gastrointestinal: soft, nt, nd, + BS    Extremities: no c/c/e    Neurological:A&O x 3 moves all ext.    Skin: no rash on exposed skin    Lymph Nodes: no lymphadenopathy.              Lab  CBC Full  -  ( 22 Feb 2018 15:22 )  WBC Count : 6.7 K/uL  Hemoglobin : 13.2 g/dL  Hematocrit : 39.1 %  Platelet Count - Automated : 480 K/uL  Mean Cell Volume : 93.2 fl  Mean Cell Hemoglobin : 31.5 pg  Mean Cell Hemoglobin Concentration : 33.8 gm/dL  Auto Neutrophil # : 5.0 K/uL  Auto Lymphocyte # : 1.1 K/uL  Auto Monocyte # : 0.6 K/uL  Auto Eosinophil # : 0.0 K/uL  Auto Basophil # : 0.0 K/uL  Auto Neutrophil % : 74.5 %  Auto Lymphocyte % : 16.6 %  Auto Monocyte % : 8.4 %  Auto Eosinophil % : 0.1 %  Auto Basophil % : 0.5 %    02-22    137  |  101  |  17  ----------------------------<  89  4.0   |  20<L>  |  0.81    Ca    10.1      22 Feb 2018 15:22    TPro  7.1  /  Alb  3.3  /  TBili  0.3  /  DBili  x   /  AST  29  /  ALT  12  /  AlkPhos  71  02-22    PT/INR - ( 22 Feb 2018 15:22 )   PT: 11.1 sec;   INR: 1.02 ratio         PTT - ( 22 Feb 2018 15:22 )  PTT:28.9 sec    Rad:    Assessment/Plan

## 2018-02-24 LAB
NIGHT BLUE STAIN TISS: SIGNIFICANT CHANGE UP
SPECIMEN SOURCE: SIGNIFICANT CHANGE UP

## 2018-02-27 LAB
CULTURE RESULTS: SIGNIFICANT CHANGE UP
SPECIMEN SOURCE: SIGNIFICANT CHANGE UP

## 2018-02-28 LAB
CULTURE RESULTS: NO GROWTH — SIGNIFICANT CHANGE UP
CULTURE RESULTS: SIGNIFICANT CHANGE UP
NON-GYNECOLOGICAL CYTOLOGY STUDY: SIGNIFICANT CHANGE UP
NON-GYNECOLOGICAL CYTOLOGY STUDY: SIGNIFICANT CHANGE UP
SPECIMEN SOURCE: SIGNIFICANT CHANGE UP
SPECIMEN SOURCE: SIGNIFICANT CHANGE UP

## 2018-03-06 ENCOUNTER — APPOINTMENT (OUTPATIENT)
Dept: ORTHOPEDIC SURGERY | Facility: CLINIC | Age: 63
End: 2018-03-06

## 2018-04-17 ENCOUNTER — FORM ENCOUNTER (OUTPATIENT)
Age: 63
End: 2018-04-17

## 2018-04-18 ENCOUNTER — APPOINTMENT (OUTPATIENT)
Dept: NEUROSURGERY | Facility: CLINIC | Age: 63
End: 2018-04-18
Payer: MEDICARE

## 2018-04-18 ENCOUNTER — APPOINTMENT (OUTPATIENT)
Dept: MRI IMAGING | Facility: CLINIC | Age: 63
End: 2018-04-18
Payer: MEDICARE

## 2018-04-18 ENCOUNTER — OUTPATIENT (OUTPATIENT)
Dept: OUTPATIENT SERVICES | Facility: HOSPITAL | Age: 63
LOS: 1 days | End: 2018-04-18
Payer: MEDICARE

## 2018-04-18 DIAGNOSIS — Z00.8 ENCOUNTER FOR OTHER GENERAL EXAMINATION: ICD-10-CM

## 2018-04-18 DIAGNOSIS — C80.1 MALIGNANT (PRIMARY) NEOPLASM, UNSPECIFIED: Chronic | ICD-10-CM

## 2018-04-18 DIAGNOSIS — Z98.89 OTHER SPECIFIED POSTPROCEDURAL STATES: Chronic | ICD-10-CM

## 2018-04-18 DIAGNOSIS — Z98.890 OTHER SPECIFIED POSTPROCEDURAL STATES: Chronic | ICD-10-CM

## 2018-04-18 PROCEDURE — 70553 MRI BRAIN STEM W/O & W/DYE: CPT

## 2018-04-18 PROCEDURE — 99213 OFFICE O/P EST LOW 20 MIN: CPT

## 2018-04-18 PROCEDURE — 70553 MRI BRAIN STEM W/O & W/DYE: CPT | Mod: 26

## 2018-04-18 PROCEDURE — A9585: CPT

## 2018-05-08 ENCOUNTER — INPATIENT (INPATIENT)
Facility: HOSPITAL | Age: 63
LOS: 1 days | Discharge: HOME | DRG: 689 | End: 2018-05-10
Attending: GENERAL ACUTE CARE HOSPITAL | Admitting: GENERAL ACUTE CARE HOSPITAL
Payer: MEDICARE

## 2018-05-08 VITALS
WEIGHT: 95.02 LBS | HEART RATE: 99 BPM | TEMPERATURE: 98 F | OXYGEN SATURATION: 96 % | RESPIRATION RATE: 18 BRPM | DIASTOLIC BLOOD PRESSURE: 68 MMHG | SYSTOLIC BLOOD PRESSURE: 98 MMHG

## 2018-05-08 DIAGNOSIS — Z98.890 OTHER SPECIFIED POSTPROCEDURAL STATES: Chronic | ICD-10-CM

## 2018-05-08 DIAGNOSIS — Z98.89 OTHER SPECIFIED POSTPROCEDURAL STATES: Chronic | ICD-10-CM

## 2018-05-08 DIAGNOSIS — N39.0 URINARY TRACT INFECTION, SITE NOT SPECIFIED: ICD-10-CM

## 2018-05-08 DIAGNOSIS — C80.1 MALIGNANT (PRIMARY) NEOPLASM, UNSPECIFIED: Chronic | ICD-10-CM

## 2018-05-08 LAB
ALBUMIN SERPL ELPH-MCNC: 3.8 G/DL — SIGNIFICANT CHANGE UP (ref 3.3–5)
ALP SERPL-CCNC: 101 U/L — SIGNIFICANT CHANGE UP (ref 40–120)
ALT FLD-CCNC: 13 U/L — SIGNIFICANT CHANGE UP (ref 10–45)
ANION GAP SERPL CALC-SCNC: 14 MMOL/L — SIGNIFICANT CHANGE UP (ref 5–17)
APPEARANCE UR: ABNORMAL
APTT BLD: 29.8 SEC — SIGNIFICANT CHANGE UP (ref 27.5–37.4)
AST SERPL-CCNC: 27 U/L — SIGNIFICANT CHANGE UP (ref 10–40)
BASOPHILS # BLD AUTO: 0.1 K/UL — SIGNIFICANT CHANGE UP (ref 0–0.2)
BASOPHILS NFR BLD AUTO: 0.7 % — SIGNIFICANT CHANGE UP (ref 0–2)
BILIRUB SERPL-MCNC: 0.4 MG/DL — SIGNIFICANT CHANGE UP (ref 0.2–1.2)
BILIRUB UR-MCNC: NEGATIVE — SIGNIFICANT CHANGE UP
BUN SERPL-MCNC: 15 MG/DL — SIGNIFICANT CHANGE UP (ref 7–23)
CALCIUM SERPL-MCNC: 11.2 MG/DL — HIGH (ref 8.4–10.5)
CHLORIDE SERPL-SCNC: 100 MMOL/L — SIGNIFICANT CHANGE UP (ref 96–108)
CO2 SERPL-SCNC: 22 MMOL/L — SIGNIFICANT CHANGE UP (ref 22–31)
COLOR SPEC: YELLOW — SIGNIFICANT CHANGE UP
CORTIS AM PEAK SERPL-MCNC: 7.8 UG/DL — SIGNIFICANT CHANGE UP (ref 6–18.4)
CREAT SERPL-MCNC: 0.95 MG/DL — SIGNIFICANT CHANGE UP (ref 0.5–1.3)
DIFF PNL FLD: ABNORMAL
EOSINOPHIL # BLD AUTO: 0.1 K/UL — SIGNIFICANT CHANGE UP (ref 0–0.5)
EOSINOPHIL NFR BLD AUTO: 1.2 % — SIGNIFICANT CHANGE UP (ref 0–6)
GLUCOSE SERPL-MCNC: 90 MG/DL — SIGNIFICANT CHANGE UP (ref 70–99)
GLUCOSE UR QL: NEGATIVE — SIGNIFICANT CHANGE UP
HCT VFR BLD CALC: 42.5 % — SIGNIFICANT CHANGE UP (ref 34.5–45)
HGB BLD-MCNC: 14.1 G/DL — SIGNIFICANT CHANGE UP (ref 11.5–15.5)
INR BLD: 1.11 RATIO — SIGNIFICANT CHANGE UP (ref 0.88–1.16)
KETONES UR-MCNC: NEGATIVE — SIGNIFICANT CHANGE UP
LEUKOCYTE ESTERASE UR-ACNC: ABNORMAL
LYMPHOCYTES # BLD AUTO: 1.6 K/UL — SIGNIFICANT CHANGE UP (ref 1–3.3)
LYMPHOCYTES # BLD AUTO: 15.3 % — SIGNIFICANT CHANGE UP (ref 13–44)
MCHC RBC-ENTMCNC: 32.7 PG — SIGNIFICANT CHANGE UP (ref 27–34)
MCHC RBC-ENTMCNC: 33.2 GM/DL — SIGNIFICANT CHANGE UP (ref 32–36)
MCV RBC AUTO: 98.5 FL — SIGNIFICANT CHANGE UP (ref 80–100)
MONOCYTES # BLD AUTO: 0.7 K/UL — SIGNIFICANT CHANGE UP (ref 0–0.9)
MONOCYTES NFR BLD AUTO: 7 % — SIGNIFICANT CHANGE UP (ref 2–14)
NEUTROPHILS # BLD AUTO: 7.9 K/UL — HIGH (ref 1.8–7.4)
NEUTROPHILS NFR BLD AUTO: 75.8 % — SIGNIFICANT CHANGE UP (ref 43–77)
NITRITE UR-MCNC: NEGATIVE — SIGNIFICANT CHANGE UP
PH UR: 6.5 — SIGNIFICANT CHANGE UP (ref 5–8)
PLATELET # BLD AUTO: 913 K/UL — HIGH (ref 150–400)
POTASSIUM SERPL-MCNC: 4.8 MMOL/L — SIGNIFICANT CHANGE UP (ref 3.5–5.3)
POTASSIUM SERPL-SCNC: 4.8 MMOL/L — SIGNIFICANT CHANGE UP (ref 3.5–5.3)
PROCALCITONIN SERPL-MCNC: <0.05 NG/ML — SIGNIFICANT CHANGE UP (ref 0–0.04)
PROT SERPL-MCNC: 8.5 G/DL — HIGH (ref 6–8.3)
PROT UR-MCNC: 30 MG/DL
PROTHROM AB SERPL-ACNC: 12.1 SEC — SIGNIFICANT CHANGE UP (ref 9.8–12.7)
RAPID RVP RESULT: SIGNIFICANT CHANGE UP
RBC # BLD: 4.31 M/UL — SIGNIFICANT CHANGE UP (ref 3.8–5.2)
RBC # FLD: 18.1 % — HIGH (ref 10.3–14.5)
SODIUM SERPL-SCNC: 136 MMOL/L — SIGNIFICANT CHANGE UP (ref 135–145)
SP GR SPEC: 1.02 — SIGNIFICANT CHANGE UP (ref 1.01–1.02)
UROBILINOGEN FLD QL: NEGATIVE — SIGNIFICANT CHANGE UP
WBC # BLD: 10.4 K/UL — SIGNIFICANT CHANGE UP (ref 3.8–10.5)
WBC # FLD AUTO: 10.4 K/UL — SIGNIFICANT CHANGE UP (ref 3.8–10.5)

## 2018-05-08 PROCEDURE — 99284 EMERGENCY DEPT VISIT MOD MDM: CPT | Mod: GC

## 2018-05-08 PROCEDURE — 71045 X-RAY EXAM CHEST 1 VIEW: CPT | Mod: 26

## 2018-05-08 RX ORDER — LEVETIRACETAM 250 MG/1
250 TABLET, FILM COATED ORAL
Qty: 0 | Refills: 0 | Status: DISCONTINUED | OUTPATIENT
Start: 2018-05-08 | End: 2018-05-10

## 2018-05-08 RX ORDER — SODIUM CHLORIDE 9 MG/ML
1000 INJECTION INTRAMUSCULAR; INTRAVENOUS; SUBCUTANEOUS
Qty: 0 | Refills: 0 | Status: DISCONTINUED | OUTPATIENT
Start: 2018-05-08 | End: 2018-05-10

## 2018-05-08 RX ORDER — DOCUSATE SODIUM 100 MG
100 CAPSULE ORAL DAILY
Qty: 0 | Refills: 0 | Status: DISCONTINUED | OUTPATIENT
Start: 2018-05-08 | End: 2018-05-10

## 2018-05-08 RX ORDER — LEVOTHYROXINE SODIUM 125 MCG
125 TABLET ORAL DAILY
Qty: 0 | Refills: 0 | Status: DISCONTINUED | OUTPATIENT
Start: 2018-05-08 | End: 2018-05-10

## 2018-05-08 RX ORDER — ESCITALOPRAM OXALATE 10 MG/1
5 TABLET, FILM COATED ORAL DAILY
Qty: 0 | Refills: 0 | Status: DISCONTINUED | OUTPATIENT
Start: 2018-05-08 | End: 2018-05-10

## 2018-05-08 RX ORDER — ENOXAPARIN SODIUM 100 MG/ML
40 INJECTION SUBCUTANEOUS DAILY
Qty: 0 | Refills: 0 | Status: DISCONTINUED | OUTPATIENT
Start: 2018-05-08 | End: 2018-05-10

## 2018-05-08 RX ORDER — CEFEPIME 1 G/1
2000 INJECTION, POWDER, FOR SOLUTION INTRAMUSCULAR; INTRAVENOUS ONCE
Qty: 0 | Refills: 0 | Status: COMPLETED | OUTPATIENT
Start: 2018-05-08 | End: 2018-05-08

## 2018-05-08 RX ORDER — SODIUM CHLORIDE 9 MG/ML
1000 INJECTION, SOLUTION INTRAVENOUS ONCE
Qty: 0 | Refills: 0 | Status: COMPLETED | OUTPATIENT
Start: 2018-05-08 | End: 2018-05-08

## 2018-05-08 RX ORDER — DIPHENHYDRAMINE HCL 50 MG
1 CAPSULE ORAL
Qty: 0 | Refills: 0 | COMMUNITY

## 2018-05-08 RX ORDER — ASPIRIN/CALCIUM CARB/MAGNESIUM 324 MG
81 TABLET ORAL DAILY
Qty: 0 | Refills: 0 | Status: DISCONTINUED | OUTPATIENT
Start: 2018-05-08 | End: 2018-05-10

## 2018-05-08 RX ORDER — MIDODRINE HYDROCHLORIDE 2.5 MG/1
5 TABLET ORAL THREE TIMES A DAY
Qty: 0 | Refills: 0 | Status: DISCONTINUED | OUTPATIENT
Start: 2018-05-08 | End: 2018-05-10

## 2018-05-08 RX ADMIN — CEFEPIME 100 MILLIGRAM(S): 1 INJECTION, POWDER, FOR SOLUTION INTRAMUSCULAR; INTRAVENOUS at 13:00

## 2018-05-08 RX ADMIN — SODIUM CHLORIDE 100 MILLILITER(S): 9 INJECTION INTRAMUSCULAR; INTRAVENOUS; SUBCUTANEOUS at 16:39

## 2018-05-08 RX ADMIN — SODIUM CHLORIDE 4000 MILLILITER(S): 9 INJECTION, SOLUTION INTRAVENOUS at 12:59

## 2018-05-08 RX ADMIN — LEVETIRACETAM 250 MILLIGRAM(S): 250 TABLET, FILM COATED ORAL at 18:30

## 2018-05-08 NOTE — ED PROVIDER NOTE - MEDICAL DECISION MAKING DETAILS
Attending MD Chawla: 63F with lung CA on chemo presenting with generalized fatigue, currently on PO levaquin for presumed UTI. Ddx includes evolving urosepsis, chemo related hypovolemia, critical anemia. Plan for pan culture, IV fluids, CXR, EKG, admission

## 2018-05-08 NOTE — ED ADULT NURSE REASSESSMENT NOTE - NS ED NURSE REASSESS COMMENT FT1
22g IVL placed to inner forearm secondary to Cortisol AM serum pending. Lab drawn and sent to lab. pt sent to floor with a food tray.

## 2018-05-08 NOTE — H&P ADULT - HISTORY OF PRESENT ILLNESS
61 y/o female with pmh significant of lung cancer diagnosed in 2014. Initially treated with chemo and RT followed by surgery, Then in June 2015, diagnosed with metastatic cancer of right wrist. Treated with Gamma knife followed by RT. In July 2015, diagnosed with metastatic cancer of the brain. Treated with surgery followed with gamma knife and immune therapy.  More rectnlyu admitted for SOB sec to  large pleural effusions and upper lobe infiltrates and possible element of chemo induced pneumonitis.. Effusion tapped and steroids started.    pt was discharged with f/u with pul and onco..   Prednisone was tapered to off after her evaluation by Dr Lai..  repeat cxr showed no significant re accumulation of pleural effusion.  and since that last admit pt developed fever of 103 sec to UTI and pt was prescribed a prolongd course of ABx ( initially 7 days ) then due recurrence of sx  another course ( thus far 5 days of levaquin )  Pt now is being admitted for worsening LE weakness , difficulty anmbulating..   no urinary or stool incontincence .. no LBP except at " tail bone due to weight loss "   no numbness and her weakness is generalized though seems chai more prominnant in LE..  no fever or chills   mild cough ( old and improving )   no CP/SOB /pal/ N/V/D   no urianry sx

## 2018-05-08 NOTE — ED PROVIDER NOTE - OBJECTIVE STATEMENT
64yo female history of metastatic lung cancer p/w generalized weakness. Recent diagnosis of UTI 2 weeks ago, now on second course of levaquin. Pt. reports increaseding generalized weakness for 2 weeks, and intermittent "dry heaving." Pt. with fevers when UTI's started, no longer with fevers. Denies cp, sob, couhg, abdominal pain, diarrhea, headache, falls. Last chemo 3 weeks ago, unable to get chemo yesterday. Onc: Maury

## 2018-05-08 NOTE — ED PROVIDER NOTE - PSH
Cancer  [id number] insertion  H/O brain surgery  7/9/15 - removal of brain tumor--post op received gamma knife followed by RT  History of lung biopsy  left   2014 EBUS  positive lung cancer  History of lung surgery  left wedge 1/15 --- diagnosed with mets to brain and right wrist  S/P wrist surgery  x 2 in prior secondary to metastasis

## 2018-05-08 NOTE — H&P ADULT - FAMILY HISTORY
Mother  Still living? No  Family history of heart disease, Age at diagnosis: Age Unknown     Father  Still living? No  Family history of bone cancer, Age at diagnosis: Age Unknown

## 2018-05-08 NOTE — ED PROVIDER NOTE - PHYSICAL EXAMINATION
Attending MD Chawla: A & O x 3, NAD, thin, chronically ill appearing, EOMI b/l, PERRL b/l; lungs CTAB, heart with reg rhythm without murmur; abdomen soft NTND; extremities with no edema; affect appropriate. neuro exam non focal with no motor or sensory deficits. right upper chest wall port site c/d/i

## 2018-05-08 NOTE — H&P ADULT - PSH
Cancer  [id number] insertion  H/O brain surgery  7/9/15 - removal of brain tumor--post op received gamma knife followed by RT  History of lung biopsy  left   2014 EBUS  positive lung cancer  History of lung surgery  left wedge 1/15 --- diagnosed with mets to brain and right wrist  S/P wrist surgery  x 2 in prior secondary to metastasis

## 2018-05-08 NOTE — H&P ADULT - ASSESSMENT
63 y/o female with pmh significant of lung cancer diagnosed in 2014. Initially treated with chemo and RT followed by surgery, Then in June 2015, diagnosed with metastatic cancer of right wrist. Treated with Gamma knife followed by RT. In July 2015, diagnosed with metastatic cancer of the brain. Treated with surgery followed with gamma knife and immune therapy.  More rectnlyu admitted for SOB sec to  large pleural effusions and upper lobe infiltrates and possible element of chemo induced pneumonitis.. Effusion tapped and steroids started.    pt was discharged with f/u with pul and onco..   Prednisone was tapered to off after her evaluation by Dr Lai..  repeat cxr showed no significant re accumulation of pleural effusion.  and since that last admit pt developed fever of 103 sec to UTI and pt was prescribed a prolongd course of ABx ( initially 7 days ) then due recurrence of sx  another course ( thus far 5 days of levaquin )  Pt now is being admitted for worsening LE weakness , difficulty anmbulating..   no urinary or stool incontincence .. no LBP except at " tail bone due to weight loss "   no numbness and her weakness is generalized though seems chai more prominnant in LE..  no fever or chills   mild cough ( old and improving )   no CP/SOB /pal/ N/V/D   no urianry sx     1- difficuloty ambulating : doubt sec to spinal cord involvement however will consider MRI   more likely sec to severe dconditioning in setting of UTI    will cont Abx and f/u cultures  will check orthostatics   PT eval    2- Cachexia : nutrition consult   3- Meatstic cancer : f/u with Dr. Mendiola   4- hx of hypotension on middrine..cont end monitor BP 63 y/o female with pmh significant of lung cancer diagnosed in 2014. Initially treated with chemo and RT followed by surgery, Then in June 2015, diagnosed with metastatic cancer of right wrist. Treated with Gamma knife followed by RT. In July 2015, diagnosed with metastatic cancer of the brain. Treated with surgery followed with gamma knife and immune therapy.  More rectnlyu admitted for SOB sec to  large pleural effusions and upper lobe infiltrates and possible element of chemo induced pneumonitis.. Effusion tapped and steroids started.    pt was discharged with f/u with pul and onco..   Prednisone was tapered to off after her evaluation by Dr Lai..  repeat cxr showed no significant re accumulation of pleural effusion.  and since that last admit pt developed fever of 103 sec to UTI and pt was prescribed a prolongd course of ABx ( initially 7 days ) then due recurrence of sx  another course ( thus far 5 days of levaquin )  Pt now is being admitted for worsening LE weakness , difficulty anmbulating..   no urinary or stool incontincence .. no LBP except at " tail bone due to weight loss "   no numbness and her weakness is generalized though seems chai more prominnant in LE..  no fever or chills   mild cough ( old and improving )   no CP/SOB /pal/ N/V/D   no urianry sx     1- difficuloty ambulating : doubt sec to spinal cord involvement however will consider MRI   more likely sec to severe dconditioning in setting of UTI    will cont Abx and f/u cultures  will check orthostatics , IVF   check procalcitonin and CK   d/c zocor and check keppra level   PT eval    2- Cachexia : nutrition consult   3- Meatstic cancer : f/u with Dr. Mendiola   4- hx of hypotension on middrine..cont end monitor BP

## 2018-05-08 NOTE — ED CLERICAL - DIVISION
Please call Jono that she did not pass the 1 hr GTT and     Needs to have the 3 hr GTT   [facility]...

## 2018-05-08 NOTE — ED PROVIDER NOTE - ATTENDING CONTRIBUTION TO CARE
Attending MD Chawla:  I personally have seen and examined this patient.  Resident note reviewed and agree on plan of care and except where noted.  See HPI, PE, and MDM for details.

## 2018-05-08 NOTE — ED ADULT NURSE NOTE - OBJECTIVE STATEMENT
63 year old female, a+ox3 presents to ED complaining of feeling fatigued and increasing weakness x 3-4 weeks. 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 Pt was dx with UTI more than a week ago and was placed on Levaquin. Pt finished with her course of levaquin and restarted on levaquin again. reports dysuria and difficulty urinating. Denies hematuria / fevers/ denies CVA tenderness. No recent fevers after first course of antibiotics. Lungs CTA unlabored, reports dry cough. no chest pain, palpitations, or sob. Abd soft nontender, nondistended. no n/v/d.  Denies vaginal discharge. Skin w/d/i. Excessive tearing from eyes secondary to no eyelashes post chemo. Pt has an unaccessed mediport to right chest wall.

## 2018-05-08 NOTE — ED ADULT TRIAGE NOTE - CHIEF COMPLAINT QUOTE
weakness, pain upon urination, has recently been treated for a UTI, currently on chemo- last treatment was 3-4 weeks ago, was told to come to ED by her oncologist

## 2018-05-08 NOTE — CONSULT NOTE ADULT - ASSESSMENT
63F PMH lung CA with metastatic lesion s/p resection/SRS with worsening BLE weakness, now requiring a wheelchair.   -Recommend CTH, although since she was symptomatic for the MRI, and MRI is improved, low suspicion for worsening edema/recurrent lesion/radiation necrosis.   -If CTH negative, continue care per medicine. Symptoms may be a combination of deconditioning with a superimposed UTI  - Will f/u CTH results 63F PMH lung CA with metastatic lesion s/p resection/SRS with worsening BLE weakness, now requiring a wheelchair.   -Recommend CTH, although since she was symptomatic for the MRI, and MRI is improved, low suspicion for worsening edema/recurrent lesion/radiation necrosis.   -If CTH negative, continue care per medicine. Symptoms may be a combination of deconditioning with a superimposed UTI  - Will f/u CTH results  - MRI T/L spine w/wo contrast to r/o metastatic lesion

## 2018-05-09 ENCOUNTER — TRANSCRIPTION ENCOUNTER (OUTPATIENT)
Age: 63
End: 2018-05-09

## 2018-05-09 DIAGNOSIS — C41.9 MALIGNANT NEOPLASM OF BONE AND ARTICULAR CARTILAGE, UNSPECIFIED: ICD-10-CM

## 2018-05-09 DIAGNOSIS — I10 ESSENTIAL (PRIMARY) HYPERTENSION: ICD-10-CM

## 2018-05-09 DIAGNOSIS — E03.9 HYPOTHYROIDISM, UNSPECIFIED: ICD-10-CM

## 2018-05-09 LAB
ALBUMIN SERPL ELPH-MCNC: 3.2 G/DL — LOW (ref 3.3–5)
ALP SERPL-CCNC: 77 U/L — SIGNIFICANT CHANGE UP (ref 40–120)
ALT FLD-CCNC: 9 U/L — LOW (ref 10–45)
ANION GAP SERPL CALC-SCNC: 14 MMOL/L — SIGNIFICANT CHANGE UP (ref 5–17)
AST SERPL-CCNC: 17 U/L — SIGNIFICANT CHANGE UP (ref 10–40)
BASOPHILS # BLD AUTO: 0.21 K/UL — HIGH (ref 0–0.2)
BASOPHILS NFR BLD AUTO: 2.4 % — HIGH (ref 0–2)
BILIRUB SERPL-MCNC: 0.2 MG/DL — SIGNIFICANT CHANGE UP (ref 0.2–1.2)
BUN SERPL-MCNC: 10 MG/DL — SIGNIFICANT CHANGE UP (ref 7–23)
CALCIUM SERPL-MCNC: 9.7 MG/DL — SIGNIFICANT CHANGE UP (ref 8.4–10.5)
CHLORIDE SERPL-SCNC: 101 MMOL/L — SIGNIFICANT CHANGE UP (ref 96–108)
CK SERPL-CCNC: 28 U/L — SIGNIFICANT CHANGE UP (ref 25–170)
CO2 SERPL-SCNC: 24 MMOL/L — SIGNIFICANT CHANGE UP (ref 22–31)
CREAT SERPL-MCNC: 0.74 MG/DL — SIGNIFICANT CHANGE UP (ref 0.5–1.3)
CULTURE RESULTS: SIGNIFICANT CHANGE UP
EOSINOPHIL # BLD AUTO: 0.2 K/UL — SIGNIFICANT CHANGE UP (ref 0–0.5)
EOSINOPHIL NFR BLD AUTO: 2.3 % — SIGNIFICANT CHANGE UP (ref 0–6)
FOLATE SERPL-MCNC: >20 NG/ML — SIGNIFICANT CHANGE UP
GLUCOSE SERPL-MCNC: 86 MG/DL — SIGNIFICANT CHANGE UP (ref 70–99)
HCT VFR BLD CALC: 34.5 % — SIGNIFICANT CHANGE UP (ref 34.5–45)
HGB BLD-MCNC: 11.7 G/DL — SIGNIFICANT CHANGE UP (ref 11.5–15.5)
IMM GRANULOCYTES NFR BLD AUTO: 0.3 % — SIGNIFICANT CHANGE UP (ref 0–1.5)
LYMPHOCYTES # BLD AUTO: 1.44 K/UL — SIGNIFICANT CHANGE UP (ref 1–3.3)
LYMPHOCYTES # BLD AUTO: 16.6 % — SIGNIFICANT CHANGE UP (ref 13–44)
MAGNESIUM SERPL-MCNC: 1.7 MG/DL — SIGNIFICANT CHANGE UP (ref 1.6–2.6)
MCHC RBC-ENTMCNC: 32 PG — SIGNIFICANT CHANGE UP (ref 27–34)
MCHC RBC-ENTMCNC: 33.9 GM/DL — SIGNIFICANT CHANGE UP (ref 32–36)
MCV RBC AUTO: 94.3 FL — SIGNIFICANT CHANGE UP (ref 80–100)
MONOCYTES # BLD AUTO: 0.81 K/UL — SIGNIFICANT CHANGE UP (ref 0–0.9)
MONOCYTES NFR BLD AUTO: 9.3 % — SIGNIFICANT CHANGE UP (ref 2–14)
NEUTROPHILS # BLD AUTO: 5.98 K/UL — SIGNIFICANT CHANGE UP (ref 1.8–7.4)
NEUTROPHILS NFR BLD AUTO: 69.1 % — SIGNIFICANT CHANGE UP (ref 43–77)
PHOSPHATE SERPL-MCNC: 2.8 MG/DL — SIGNIFICANT CHANGE UP (ref 2.5–4.5)
PLATELET # BLD AUTO: 795 K/UL — HIGH (ref 150–400)
POTASSIUM SERPL-MCNC: 3.6 MMOL/L — SIGNIFICANT CHANGE UP (ref 3.5–5.3)
POTASSIUM SERPL-SCNC: 3.6 MMOL/L — SIGNIFICANT CHANGE UP (ref 3.5–5.3)
PREALB SERPL-MCNC: 26 MG/DL — SIGNIFICANT CHANGE UP (ref 18–45)
PROCALCITONIN SERPL-MCNC: <0.05 NG/ML — SIGNIFICANT CHANGE UP (ref 0–0.04)
PROT SERPL-MCNC: 6.8 G/DL — SIGNIFICANT CHANGE UP (ref 6–8.3)
RBC # BLD: 3.66 M/UL — LOW (ref 3.8–5.2)
RBC # FLD: 19.5 % — HIGH (ref 10.3–14.5)
SODIUM SERPL-SCNC: 139 MMOL/L — SIGNIFICANT CHANGE UP (ref 135–145)
SPECIMEN SOURCE: SIGNIFICANT CHANGE UP
T4 FREE SERPL-MCNC: 1 NG/DL — SIGNIFICANT CHANGE UP (ref 0.9–1.8)
TSH SERPL-MCNC: 50.63 UIU/ML — HIGH (ref 0.27–4.2)
VIT B12 SERPL-MCNC: 1605 PG/ML — HIGH (ref 232–1245)
WBC # BLD: 8.67 K/UL — SIGNIFICANT CHANGE UP (ref 3.8–10.5)
WBC # FLD AUTO: 8.67 K/UL — SIGNIFICANT CHANGE UP (ref 3.8–10.5)

## 2018-05-09 PROCEDURE — 71270 CT THORAX DX C-/C+: CPT | Mod: 26

## 2018-05-09 PROCEDURE — 99222 1ST HOSP IP/OBS MODERATE 55: CPT

## 2018-05-09 PROCEDURE — 74178 CT ABD&PLV WO CNTR FLWD CNTR: CPT | Mod: 26

## 2018-05-09 RX ADMIN — Medication 100 MILLIGRAM(S): at 12:56

## 2018-05-09 RX ADMIN — SODIUM CHLORIDE 100 MILLILITER(S): 9 INJECTION INTRAMUSCULAR; INTRAVENOUS; SUBCUTANEOUS at 06:43

## 2018-05-09 RX ADMIN — LEVETIRACETAM 250 MILLIGRAM(S): 250 TABLET, FILM COATED ORAL at 18:36

## 2018-05-09 RX ADMIN — Medication 81 MILLIGRAM(S): at 12:56

## 2018-05-09 RX ADMIN — ENOXAPARIN SODIUM 40 MILLIGRAM(S): 100 INJECTION SUBCUTANEOUS at 12:56

## 2018-05-09 RX ADMIN — MIDODRINE HYDROCHLORIDE 5 MILLIGRAM(S): 2.5 TABLET ORAL at 06:43

## 2018-05-09 RX ADMIN — MIDODRINE HYDROCHLORIDE 5 MILLIGRAM(S): 2.5 TABLET ORAL at 22:33

## 2018-05-09 RX ADMIN — Medication 125 MICROGRAM(S): at 06:43

## 2018-05-09 RX ADMIN — LEVETIRACETAM 250 MILLIGRAM(S): 250 TABLET, FILM COATED ORAL at 06:43

## 2018-05-09 RX ADMIN — ESCITALOPRAM OXALATE 5 MILLIGRAM(S): 10 TABLET, FILM COATED ORAL at 12:56

## 2018-05-09 NOTE — CONSULT NOTE ADULT - ASSESSMENT
62 F w hx of met lung Ca on chemo with orthostasis, nl LV, chemo induced pneumonitis, UTI presents with weakness.   - Denies any syncope.   - Check orthostatics.   - Cont midodrine 5mg q8. May have to titrate up if bp dropping  - Encourage PO intake.   - No need for repeat cardiac imaging at this time.   - No signs of significant ischemia or volume overload. Can check routine EKG.   -  f/u with heme/onc  - Monitor and replete electrolytes. Keep K>4.0 and Mg>2.0.   - Further cardiac workup will depend on clinical course.   - All other workup per primary team. Will followup.

## 2018-05-09 NOTE — DISCHARGE NOTE ADULT - MEDICATION SUMMARY - MEDICATIONS TO STOP TAKING
I will STOP taking the medications listed below when I get home from the hospital:    Levaquin 500 mg oral tablet  -- 1 tab(s) by mouth every 24 hours     Note: this medication was to be completed on thurs 5/10/2018

## 2018-05-09 NOTE — PROGRESS NOTE ADULT - SUBJECTIVE AND OBJECTIVE BOX
Patient is a 63y old  Female who presents with a chief complaint of weakness (09 May 2018 09:57)                                                               INTERVAL HPI/OVERNIGHT EVENTS:    REVIEW OF SYSTEMS:     CONSTITUTIONAL: No weakness, fevers or chills  RESPIRATORY: No cough, wheezing,  No shortness of breath  CARDIOVASCULAR: No chest pain or palpitations  GASTROINTESTINAL: mild  abdominal pain  . No nausea, vomiting, or hematemesis; No diarrhea or constipation. No melena or hematochezia.  GENITOURINARY: No dysuria, frequency  NEUROLOGICAL: No numbness or weakness                                                                                                                                                                                                                                                                                  Medications:  MEDICATIONS  (STANDING):  aspirin enteric coated 81 milliGRAM(s) Oral daily  docusate sodium 100 milliGRAM(s) Oral daily  enoxaparin Injectable 40 milliGRAM(s) SubCutaneous daily  escitalopram 5 milliGRAM(s) Oral daily  levETIRAcetam 250 milliGRAM(s) Oral two times a day  levoFLOXacin  Tablet 250 milliGRAM(s) Oral every 24 hours  levothyroxine 125 MICROGram(s) Oral daily  midodrine 5 milliGRAM(s) Oral three times a day  sodium chloride 0.9%. 1000 milliLiter(s) (100 mL/Hr) IV Continuous <Continuous>    MEDICATIONS  (PRN):       Allergies    penicillin (Unknown)  Zosyn (Urticaria; Rash; Hives)    Intolerances      Vital Signs Last 24 Hrs  T(C): 36.4 (09 May 2018 20:52), Max: 36.8 (09 May 2018 12:40)  T(F): 97.5 (09 May 2018 20:52), Max: 98.2 (09 May 2018 12:40)  HR: 80 (09 May 2018 20:52) (68 - 80)  BP: 115/84 (09 May 2018 20:52) (115/84 - 150/87)  BP(mean): --  RR: 18 (09 May 2018 20:52) (18 - 18)  SpO2: 96% (09 May 2018 20:52) (91% - 96%)  CAPILLARY BLOOD GLUCOSE           @ 07:01  -   @ 07:00  --------------------------------------------------------  IN: 1320 mL / OUT: 600 mL / NET: 720 mL     @ 07:01  -   @ 23:13  --------------------------------------------------------  IN: 720 mL / OUT: 300 mL / NET: 420 mL      Physical Exam:       Daily Weight in k.3 (09 May 2018 10:58)  General:  Well appearing, NAD  HEENT:  Nonicteric, PERRLA  CV:  RRR, S1S2   Lungs:  CTA B/L, no wheezes, rales, rhonchi  Abdomen:  distended obese NT   Extremities: trace  edema  Skin:  Warm and dry, no rashes  :  No carcamo  Neuro:  AAOx3, non-focal, grossly intact                                                                                                                                                                                                                                                                                                LABS:                               11.7   8.67  )-----------( 795      ( 09 May 2018 07:45 )             34.5                          139  |  101  |  10  ----------------------------<  86  3.6   |  24  |  0.74    Ca    9.7      09 May 2018 05:46  Phos  2.8       Mg     1.7         TPro  6.8  /  Alb  3.2<L>  /  TBili  0.2  /  DBili  x   /  AST  17  /  ALT  9<L>  /  AlkPhos  77   Patient is a 63y old  Female who presents with a chief complaint of weakness (09 May 2018 09:57)                                                               INTERVAL HPI/OVERNIGHT EVENTS:    REVIEW OF SYSTEMS:     CONSTITUTIONAL: imroving  weakness, no fevers or chills  RESPIRATORY: No cough, wheezing,  No shortness of breath  CARDIOVASCULAR: No chest pain or palpitations  GASTROINTESTINAL: mild  abdominal pain  . No nausea, vomiting, or hematemesis; No diarrhea or constipation. No melena or hematochezia.  GENITOURINARY: No dysuria, frequency  NEUROLOGICAL: No numbness or focal  weakness                                                                                                                                                                                                                                                                                  Medications:  MEDICATIONS  (STANDING):  aspirin enteric coated 81 milliGRAM(s) Oral daily  docusate sodium 100 milliGRAM(s) Oral daily  enoxaparin Injectable 40 milliGRAM(s) SubCutaneous daily  escitalopram 5 milliGRAM(s) Oral daily  levETIRAcetam 250 milliGRAM(s) Oral two times a day  levoFLOXacin  Tablet 250 milliGRAM(s) Oral every 24 hours  levothyroxine 125 MICROGram(s) Oral daily  midodrine 5 milliGRAM(s) Oral three times a day  sodium chloride 0.9%. 1000 milliLiter(s) (100 mL/Hr) IV Continuous <Continuous>    MEDICATIONS  (PRN):       Allergies    penicillin (Unknown)  Zosyn (Urticaria; Rash; Hives)    Intolerances      Vital Signs Last 24 Hrs  T(C): 36.4 (09 May 2018 20:52), Max: 36.8 (09 May 2018 12:40)  T(F): 97.5 (09 May 2018 20:52), Max: 98.2 (09 May 2018 12:40)  HR: 80 (09 May 2018 20:52) (68 - 80)  BP: 115/84 (09 May 2018 20:52) (115/84 - 150/87)  BP(mean): --  RR: 18 (09 May 2018 20:52) (18 - 18)  SpO2: 96% (09 May 2018 20:52) (91% - 96%)  CAPILLARY BLOOD GLUCOSE           @ 07:01  -   @ 07:00  --------------------------------------------------------  IN: 1320 mL / OUT: 600 mL / NET: 720 mL     @ 07:01  -   @ 23:13  --------------------------------------------------------  IN: 720 mL / OUT: 300 mL / NET: 420 mL      Physical Exam:       Daily Weight in k.3 (09 May 2018 10:58)  General:   NAD  HEENT:  Nonicteric, PERRLA  CV:  RRR, S1S2   Lungs:  CTA B/L, no wheezes, rales, rhonchi  Abdomen:  Soft   NT   Extremities: no  edema  Skin:  Warm and dry, no rashes  :  No carcamo  Neuro:  AAOx3, non-focal, grossly intact                                                                                                                                                                                                                                                                                                LABS:                               11.7   8.67  )-----------( 795      ( 09 May 2018 07:45 )             34.5                          139  |  101  |  10  ----------------------------<  86  3.6   |  24  |  0.74    Ca    9.7      09 May 2018 05:46  Phos  2.8       Mg     1.7         TPro  6.8  /  Alb  3.2<L>  /  TBili  0.2  /  DBili  x   /  AST  17  /  ALT  9<L>  /  AlkPhos  77

## 2018-05-09 NOTE — DISCHARGE NOTE ADULT - CARE PROVIDER_API CALL
Frandy Mendiola), Hematology; Medical Oncology  [address] [place] [address]  Phone: [phone]  Fax: [phone] Frandy Mendiola), Hematology; Medical Oncology  [address]  [place] [address]  Phone: [phone]  Fax: [phone]    Theron Monaco), EndocrinologyMetabDiabetes; Internal Medicine  [address] [place] [address]  Phone: [phone]  Fax: [phone]

## 2018-05-09 NOTE — DISCHARGE NOTE ADULT - PLAN OF CARE
resolution of symptoms Physical therapy Antibiotics completed  Drink enough water and fluids to keep your urine clear or pale yellow.  Avoid caffeine, tea, and carbonated beverages. They tend to irritate your bladder.  Empty your bladder often. Avoid holding urine for long periods of time.  After a bowel movement, women should cleanse from front to back. Use each tissue only once.  SEEK MEDICAL CARE IF:  You have back pain.  You develop a fever.  Your symptoms do not begin to resolve within 3 days.  SEEK IMMEDIATE MEDICAL CARE IF:  You have severe back pain or lower abdominal pain.  You develop chills.  You have nausea or vomiting.  You have continued burning or discomfort with urination. you do not make enough thyroid hormone  signs & symptoms of low levels of thyroid hormone - tired, getting cold easily, coarse or thin hair, constipation, shortness of breath, swelling, irregular periods  your doctor will do thyroid hormone blood tests at least once a year to monitor if medication dose is adequate  take your thyroid medicine as directed by your doctor & on empty stomach Further treatment as outpatient. Follow up with Oncologist in 2 weeks

## 2018-05-09 NOTE — DISCHARGE NOTE ADULT - ADDITIONAL INSTRUCTIONS
Follow up with PMD in 1 week Follow up with PMD in 1 week  Follow up with Endocrinologist in 6-8 weeks for repeat blood work to adjust synthroid

## 2018-05-09 NOTE — DISCHARGE NOTE ADULT - CARE PLAN
Principal Discharge DX:	Weakness  Goal:	resolution of symptoms  Assessment and plan of treatment:	Physical therapy  Secondary Diagnosis:	Acute cystitis without hematuria  Assessment and plan of treatment:	Antibiotics completed  Drink enough water and fluids to keep your urine clear or pale yellow.  Avoid caffeine, tea, and carbonated beverages. They tend to irritate your bladder.  Empty your bladder often. Avoid holding urine for long periods of time.  After a bowel movement, women should cleanse from front to back. Use each tissue only once.  SEEK MEDICAL CARE IF:  You have back pain.  You develop a fever.  Your symptoms do not begin to resolve within 3 days.  SEEK IMMEDIATE MEDICAL CARE IF:  You have severe back pain or lower abdominal pain.  You develop chills.  You have nausea or vomiting.  You have continued burning or discomfort with urination.  Secondary Diagnosis:	Hypothyroidism, unspecified type  Assessment and plan of treatment:	you do not make enough thyroid hormone  signs & symptoms of low levels of thyroid hormone - tired, getting cold easily, coarse or thin hair, constipation, shortness of breath, swelling, irregular periods  your doctor will do thyroid hormone blood tests at least once a year to monitor if medication dose is adequate  take your thyroid medicine as directed by your doctor & on empty stomach  Secondary Diagnosis:	Malignant neoplasm of lung, unspecified laterality, unspecified part of lung  Assessment and plan of treatment:	Further treatment as outpatient. Follow up with Oncologist in 2 weeks

## 2018-05-09 NOTE — DISCHARGE NOTE ADULT - SECONDARY DIAGNOSIS.
Acute cystitis without hematuria Hypothyroidism, unspecified type Malignant neoplasm of lung, unspecified laterality, unspecified part of lung

## 2018-05-09 NOTE — DISCHARGE NOTE ADULT - HOSPITAL COURSE
62 F w hx of met lung Ca on chemo with orthostasis, nl LV, chemo induced pneumonitis, UTI presents with weakness.   1) Onc- Met. NSCLC. Further treatment as outpatient. Only recently started on Gemzar. Recent decline in CEA.  - Ct scans look relatively ok , without evidence of significant change  - plan to hold  off on treatment for at least 2-4 weeks to allow patient time to recover   2) Heme- Thrombocytosis- likely reactive  - on asa, enoxaparin   3)weakness- Possibly related to recent infxn. Unclear if recent trouble with hypotension playing a role.   - pt  4) Endocrine following for thyroid mgmt. 62 F w hx of met lung Ca on chemo with orthostasis, nl LV, chemo induced pneumonitis, UTI presents with weakness.   1) Onc- Met. NSCLC. Further treatment as outpatient. Only recently started on Gemzar. Recent decline in CEA.  - Ct scans look relatively ok , without evidence of significant change  - plan to hold  off on treatment for at least 2-4 weeks to allow patient time to recover   2) Heme- Thrombocytosis- likely reactive  - on asa, enoxaparin   3)weakness- Possibly related to recent infxn. Unclear if recent trouble with hypotension playing a role.   - pt  4) Endocrine following for thyroid mgmt. synthroid increased, TSH 50.63 61 y/o female with pmh significant of lung cancer diagnosed in 2014. Initially treated with chemo and RT followed by surgery, Then in June 2015, diagnosed with metastatic cancer of right wrist. Treated with Gamma knife followed by RT. In July 2015, diagnosed with metastatic cancer of the brain. Treated with surgery followed with gamma knife and immune therapy.  More rectnlyu admitted for SOB sec to  large pleural effusions and upper lobe infiltrates and possible element of chemo induced pneumonitis.. Effusion tapped and steroids started.    pt was discharged with f/u with pul and onco..   Prednisone was tapered to off after her evaluation by Dr Lai..  repeat cxr showed no significant re accumulation of pleural effusion.  and since that last admit pt developed fever of 103 sec to UTI and pt was prescribed a prolongd course of ABx ( initially 7 days ) then due recurrence of sx  another course ( thus far 5 days of levaquin )  Pt now is being admitted for worsening LE weakness , difficulty anmbulating..   no urinary or stool incontincence .. no LBP except at " tail bone due to weight loss "   no numbness and her weakness is generalized though seems chai more prominnant in LE..  no fever or chills   mild cough ( old and improving )   no CP/SOB /pal/ N/V/D   no urianry sx     1- difficuloty ambulating : doubt sec to spinal cord  more likely sec to severe dconditioning in setting of recent UTI    orthostatics negative    d/c abx   culturesnegative and  procalcitonin NL   d/yenifer zocor   PT eval: home with home PT    2- Cachexia : nutrition consult   3- Meatstic lung  cancer : no significant change on CT:   CT C/A/P : < from: CT Abdomen and Pelvis w/wo IV Cont (05.09.18 @ 14:49) >  Enlarging necrotic mediastinal mass as described above.    Similar-appearing 2.0 x 1.6 cm right axillary lymph node, non-FDG avid on   prior PET/CT study.    Interval decrease in bilateral upper lobe airspace opacities.   Similar-appearing bilateral large pleural effusions.       4- hx of hypotension on middrine..cont end monitor BP   5- hypothyroid : cont  synthroid  per endo ... discussed possibilty of adrenal insuffiency however seems to be less likely with cortisol level .. f/u as o/p with endo    .needs TFT in 6-8 wks

## 2018-05-09 NOTE — PHYSICAL THERAPY INITIAL EVALUATION ADULT - DISCHARGE DISPOSITION, PT EVAL
home w/ home PT/Home with home PT for safety assessment, gait, balance, & strength training and to return pt to baseline functional mobility status. Pt owns RW.

## 2018-05-09 NOTE — DISCHARGE NOTE ADULT - PATIENT PORTAL LINK FT
You can access the [facility], offered by [facility], by registering with the following website: [email or web address]

## 2018-05-09 NOTE — PHYSICAL THERAPY INITIAL EVALUATION ADULT - ADDITIONAL COMMENTS
Patient was living at home with spouse. Patient is independent in ADL's and ambulation with occasional use of RW .

## 2018-05-09 NOTE — PROGRESS NOTE ADULT - SUBJECTIVE AND OBJECTIVE BOX
AYAKA DENT  MRN-[id number]    Patient is a 63y old  Female who presents with a chief complaint of weakness (09 May 2018 00:33)      Review of System  REVIEW OF SYSTEMS      General:	+ fatigue and weakness    Skin/Breast: denies pruritis, rash  	  Ophthalmologic: no change in vision or blurring  	  HEENT	Denies dry mouth, oral sores, dysphagia,  change in hearing.    Respiratory and Thorax:  no cough, sob, wheeze, hemoptysis  	  Cardiovascular:	no cp , palp, orthopnea    Gastrointestinal:	+ nausea    Genitourinary:	+ freq frequency, no hematuria, no flank pain    Musculoskeletal:	no bone or joint pain. no muscle aches.     Neurological:	no change in sensory or motor function. no headache. no weakness.     Psychiatric:	no depression, no anxiety, insomnia.     Hematology/Lymphatics:	no bleeding or bruising        Current Meds  MEDICATIONS  (STANDING):  aspirin enteric coated 81 milliGRAM(s) Oral daily  docusate sodium 100 milliGRAM(s) Oral daily  enoxaparin Injectable 40 milliGRAM(s) SubCutaneous daily  escitalopram 5 milliGRAM(s) Oral daily  levETIRAcetam 250 milliGRAM(s) Oral two times a day  levoFLOXacin  Tablet 250 milliGRAM(s) Oral every 24 hours  levothyroxine 125 MICROGram(s) Oral daily  midodrine 5 milliGRAM(s) Oral three times a day  sodium chloride 0.9%. 1000 milliLiter(s) (100 mL/Hr) IV Continuous <Continuous>    MEDICATIONS  (PRN):      Vitals  Vital Signs Last 24 Hrs  T(C): 36.7 (09 May 2018 04:31), Max: 36.7 (08 May 2018 18:36)  T(F): 98.1 (09 May 2018 04:31), Max: 98.1 (08 May 2018 20:55)  HR: 73 (09 May 2018 04:31) (70 - 99)  BP: 116/81 (09 May 2018 04:31) (98/68 - 148/89)  BP(mean): --  RR: 18 (09 May 2018 04:31) (16 - 18)  SpO2: 93% (09 May 2018 04:31) (93% - 100%)    PHYSICAL EXAM:      Constitutional: NAD, chachexia ( chronic)    Eyes: PERRLA EOMI, anicteric sclera    Heent :No oral sores, no pharyngeal injection. moist mucosa.    Neck: supple, no jvd, no LAD    Respiratory: CTA b/l     Cardiovascular: s1s2, no m/g/r    Gastrointestinal: soft, nt, nd, + BS    Extremities: no c/c/e    Neurological:A&O x 3 moves all ext.    Skin: no rash on exposed skin    Lymph Nodes: no lymphadenopathy.      Lab  CBC Full  -  ( 08 May 2018 12:45 )  WBC Count : 10.4 K/uL  Hemoglobin : 14.1 g/dL  Hematocrit : 42.5 %  Platelet Count - Automated : 913 K/uL  Mean Cell Volume : 98.5 fl  Mean Cell Hemoglobin : 32.7 pg  Mean Cell Hemoglobin Concentration : 33.2 gm/dL  Auto Neutrophil # : 7.9 K/uL  Auto Lymphocyte # : 1.6 K/uL  Auto Monocyte # : 0.7 K/uL  Auto Eosinophil # : 0.1 K/uL  Auto Basophil # : 0.1 K/uL  Auto Neutrophil % : 75.8 %  Auto Lymphocyte % : 15.3 %  Auto Monocyte % : 7.0 %  Auto Eosinophil % : 1.2 %  Auto Basophil % : 0.7 %    05-09    139  |  101  |  10  ----------------------------<  86  3.6   |  24  |  0.74    Ca    9.7      09 May 2018 05:46  Phos  2.8     05-09  Mg     1.7     05-09    TPro  6.8  /  Alb  3.2<L>  /  TBili  0.2  /  DBili  x   /  AST  17  /  ALT  9<L>  /  AlkPhos  77  05-09    PT/INR - ( 08 May 2018 12:45 )   PT: 12.1 sec;   INR: 1.11 ratio         PTT - ( 08 May 2018 12:45 )  PTT:29.8 sec    Rad:    Assessment/Plan

## 2018-05-09 NOTE — DISCHARGE NOTE ADULT - MEDICATION SUMMARY - MEDICATIONS TO TAKE
I will START or STAY ON the medications listed below when I get home from the hospital:    Dulcolax oral tablet  -- 2 tab(s) by mouth once a day (at bedtime)  -- Indication: For constipation    Physical therapy   -- Indication: For pt    aspirin 81 mg oral delayed release tablet  -- 1 tab(s) by mouth once a day  -- Indication: For cad    Keppra 500 mg oral tablet  -- 0.5 tab(s) by mouth 2 times a day  -- Indication: For seizure prophlactic    Lexapro 10 mg oral tablet  -- 0.5 tab(s) by mouth once a day  -- Indication: For depression    Compazine 10 mg oral tablet  -- 1 tab(s) by mouth every 6 hours, As Needed  -- Indication: For nausea    Marinol 5 mg oral capsule  -- 1 cap(s) by mouth once a day  -- Indication: For Appetite     Benefiber oral powder for reconstitution  -- orally once a day  -- Indication: For constipation    docusate sodium 100 mg oral capsule  -- 1 cap(s) by mouth once a day  -- Indication: For constipation    midodrine 5 mg oral tablet  -- 1 tab(s) by mouth 3 times a day  -- Indication: For Hypotension    levothyroxine 137 mcg (0.137 mg) oral tablet  -- 1 tab(s) by mouth once a day  -- Indication: For Hypothyroidism    One-A-Day Women oral tablet  -- 1 tab(s) by mouth once a day  -- Indication: For vitamin

## 2018-05-09 NOTE — CONSULT NOTE ADULT - SUBJECTIVE AND OBJECTIVE BOX
HPI:  61 y/o female with pmh significant of lung cancer diagnosed in . Initially treated with chemo and RT followed by surgery, Then in 2015, diagnosed with metastatic cancer of right wrist. Treated with Gamma knife followed by RT. In 2015, diagnosed with metastatic cancer of the brain. Treated with surgery followed with gamma knife and immune therapy.  More rectnlyu admitted for SOB sec to  large pleural effusions and upper lobe infiltrates and possible element of chemo induced pneumonitis.. Effusion tapped and steroids started.    pt was discharged with f/u with pul and onco..   Prednisone was tapered to off after her evaluation by Dr Lai..  repeat cxr showed no significant re accumulation of pleural effusion.  and since that last admit pt developed fever of 103 sec to UTI and pt was prescribed a prolongd course of ABx ( initially 7 days ) then due recurrence of sx  another course ( thus far 5 days of levaquin )  Pt now is being admitted for worsening LE weakness , difficulty anmbulating..   no urinary or stool incontincence .. no LBP except at " tail bone due to weight loss "   no numbness and her weakness is generalized though seems chai more prominnant in LE..  no fever or chills   mild cough ( old and improving )   no CP/SOB /pal/ N/V/D   no urianry sx (08 May 2018 14:29)  Patient has history of hypothyroidism on Synthroid 125 mcg daily, compliant with synthroid intake, feeling week.    PAST MEDICAL & SURGICAL HISTORY:  Heart murmur: history F/u cardiac work up - asprin 81 mg for cardiac prophylaxis  Brain tumor: s/p brain surgery in   Malignant neoplasm of bone: Broken internal joint prothesis  Mass: right wrist mass in 2016  Depression  Lung cancer: diagnosed in  --  mets to bone, brain s/p craniotomy for brain met resection, lung wedge resection and right forearm met resection. also s/p xrt  Currently on chemo last chemo was 2017 will completeed end of   Hyponatremia  History of seizure: occurred before brain surgery -- last seizure 2015  Lung cancer, left: biopsy  chemo cisplatin/Alimta 2014  radiation   Anxiety  Hypothyroidism  Hyperlipidemia  Hypertension  S/P wrist surgery: x 2 in prior secondary to metastasis  Cancer: [id number]-[id number] insertion  H/O brain surgery: 7/9/15 - removal of brain tumor--post op received gamma knife followed by RT  History of lung surgery: left wedge 1/15 --- diagnosed with mets to brain and right wrist  History of lung biopsy: left   2014 EBUS  positive lung cancer        FAMILY HISTORY:  Family history of heart disease (Mother): Mom  Family history of bone cancer (Father)      Social History:    Outpatient Medications:    MEDICATIONS  (STANDING):  aspirin enteric coated 81 milliGRAM(s) Oral daily  docusate sodium 100 milliGRAM(s) Oral daily  enoxaparin Injectable 40 milliGRAM(s) SubCutaneous daily  escitalopram 5 milliGRAM(s) Oral daily  levETIRAcetam 250 milliGRAM(s) Oral two times a day  levoFLOXacin  Tablet 250 milliGRAM(s) Oral every 24 hours  levothyroxine 125 MICROGram(s) Oral daily  midodrine 5 milliGRAM(s) Oral three times a day  sodium chloride 0.9%. 1000 milliLiter(s) (100 mL/Hr) IV Continuous <Continuous>    MEDICATIONS  (PRN):      Allergies    penicillin (Unknown)  Zosyn (Urticaria; Rash; Hives)    Intolerances      Review of Systems:  Constitutional: No fever, no chills  Eyes: No blurry vision  Neuro: No tremors  HEENT: No pain, no neck swelling  Cardiovascular: No chest pain, no palpitations  Respiratory: Has SOB, no cough  GI: No nausea, vomiting, abdominal pain  : No dysuria  Skin: no rash  MSK: Has leg swelling.  Psych: no depression  Endocrine: no polyuria, polydipsia    ALL OTHER SYSTEMS REVIEWED AND NEGATIVE    UNABLE TO OBTAIN    PHYSICAL EXAM:  VITALS: T(C): 36.4 (18 @ 20:52)  T(F): 97.5 (18 @ 20:52), Max: 98.2 (18 @ 12:40)  HR: 80 (18 @ 20:52) (68 - 80)  BP: 115/84 (18 @ 20:52) (115/84 - 150/87)  RR:  (18 - 18)  SpO2:  (91% - 96%)  Wt(kg): --  GENERAL: NAD, well-groomed, well-developed  EYES: No proptosis, no lid lag  HEENT:  Atraumatic, Normocephalic  THYROID: Normal size, no palpable nodules  RESPIRATORY: Clear to auscultation bilaterally; No rales, rhonchi, wheezing  CARDIOVASCULAR: Si S2, No murmurs;  GI: Soft, non distended, normal bowel sounds  SKIN: Dry, intact, No rashes or lesions  MUSCULOSKELETAL: Has BL lower extremity edema.  NEURO:  no tremor, sensation decreased in feet BL,                              11.7   8.67  )-----------( 795      ( 09 May 2018 07:45 )             34.5           139  |  101  |  10  ----------------------------<  86  3.6   |  24  |  0.74    EGFR if : 100  EGFR if non : 86    Ca    9.7        Mg     1.7       Phos  2.8         TPro  6.8  /  Alb  3.2<L>  /  TBili  0.2  /  DBili  x   /  AST  17  /  ALT  9<L>  /  AlkPhos  77        Thyroid Function Tests:   @ 07:50 TSH 50.63 FreeT4 1.0 T3 -- Anti TPO -- Anti Thyroglobulin Ab -- TSI --              Radiology:
AYAKA DENT  MRN-[id number]    Patient is a 63y old  Female who presents with a chief complaint of difficulty ambulating/weakness (08 May 2018 14:29)      HPI  61 y/o woman well known to me with h/o metastatic lung cancer. Patient with h/o wrist and brain lesions Both treated in the past with resection as well as xrt. Patient is known to Dr. Cornejo as well as Dr. Clark. Patient with distant h/o hospital stay due to dilantin toxicity. Patient had recently been on treatment with taxotere, on which she did well until developing pnumonitis and a pleural effusion. Patient known to Dr. Lai, responded to treatment with steroids. Patient recently started on treatment with Gemzar.   In recent weeks patient has had progressive difficulty with weakness. She is always thin but now is eating less and losing weight. Patient unable to ambulate significantly, sent to [facility] for further evaluation.  Patient s/p course of levaquin for UTI.     PAST MEDICAL & SURGICAL HISTORY:  Heart murmur: history F/u cardiac work up - asprin 81 mg for cardiac prophylaxis  Brain tumor: s/p brain surgery in   Malignant neoplasm of bone: Broken internal joint prothesis  Mass: right wrist mass in 2016  Depression  Lung cancer: diagnosed in  --  mets to bone, brain s/p craniotomy for brain met resection, lung wedge resection and right forearm met resection. also s/p xrt  Hyponatremia  History of seizure: occurred before brain surgery -- last seizure 2015  Lung cancer, left: biopsy  chemo cisplatin/Alimta 2014  radiation 2014  Anxiety  Hypothyroidism  Hyperlipidemia  Hypertension  S/P wrist surgery: x 2 in prior secondary to metastasis  Cancer: Gyrdx-y-[id number] insertion  H/O brain surgery: 7/9/15 - removal of brain tumor--post op received gamma knife followed by RT  History of lung surgery: left wedge 1/15 --- diagnosed with mets to brain and right wrist  History of lung biopsy: left   2014 EBUS  positive lung cancer    COPD  Osteopenia    Current Meds  MEDICATIONS  (STANDING):  aspirin enteric coated 81 milliGRAM(s) Oral daily  docusate sodium 100 milliGRAM(s) Oral daily  enoxaparin Injectable 40 milliGRAM(s) SubCutaneous daily  escitalopram 5 milliGRAM(s) Oral daily  levETIRAcetam 250 milliGRAM(s) Oral two times a day  levoFLOXacin  Tablet 250 milliGRAM(s) Oral every 24 hours  levothyroxine 125 MICROGram(s) Oral daily  midodrine 5 milliGRAM(s) Oral three times a day  sodium chloride 0.9%. 1000 milliLiter(s) (100 mL/Hr) IV Continuous <Continuous>    Allergies    penicillin (Unknown)  Zosyn (Urticaria; Rash; Hives)      Social History  , Retired. No tob.  No etoh    Family History  Family history of heart disease (Mother): Mom  Family history of bone cancer (Father)      REVIEW OF SYSTEMS      General:	+ fatigue as well as decreased appetite and weight loss    Skin/Breast: denies pruritis, rash  	  Ophthalmologic: no change in vision or blurring  	  HEENT	Denies dry mouth, oral sores, dysphagia,  change in hearing.    Respiratory and Thorax:  + cough as well as soboe  	  Cardiovascular:	no cp , palp, orthopnea    Gastrointestinal:	+ nausea and occasional vomiting.    Genitourinary:	+ dysuria    Musculoskeletal:	no bone or joint pain. no muscle aches.     Neurological:	no change in sensory or motor function. no headache. no weakness.     Psychiatric:	no depression, no anxiety, insomnia.     Hematology/Lymphatics:	no bleeding or bruising        Vitals  Vital Signs Last 24 Hrs  T(C): 36.7 (08 May 2018 18:36), Max: 36.7 (08 May 2018 18:36)  T(F): 98 (08 May 2018 18:36), Max: 98 (08 May 2018 18:36)  HR: 80 (08 May 2018 18:36) (70 - 99)  BP: 148/89 (08 May 2018 18:36) (98/68 - 148/89)  BP(mean): --  RR: 18 (08 May 2018 18:36) (16 - 18)  SpO2: 100% (08 May 2018 18:36) (96% - 100%)    Physical Exam  PHYSICAL EXAM:      Constitutional: NAD    Eyes: PERRLA EOMI, anicteric sclera    Heent :No oral sores, no pharyngeal injection. moist mucosa.    Neck: supple, no jvd, no LAD    Respiratory: CTA b/l     Cardiovascular: s1s2, no m/g/r    Gastrointestinal: soft, nt, nd, + BS    Extremities: no c/c/e    Neurological:A&O x 3 moves all ext.    Skin: no rash on exposed skin    Lymph Nodes: no lymphadenopathy.    Lab  CBC Full  -  ( 08 May 2018 12:45 )  WBC Count : 10.4 K/uL  Hemoglobin : 14.1 g/dL  Hematocrit : 42.5 %  Platelet Count - Automated : 913 K/uL  Mean Cell Volume : 98.5 fl  Mean Cell Hemoglobin : 32.7 pg  Mean Cell Hemoglobin Concentration : 33.2 gm/dL  Auto Neutrophil # : 7.9 K/uL  Auto Lymphocyte # : 1.6 K/uL  Auto Monocyte # : 0.7 K/uL  Auto Eosinophil # : 0.1 K/uL  Auto Basophil # : 0.1 K/uL  Auto Neutrophil % : 75.8 %  Auto Lymphocyte % : 15.3 %  Auto Monocyte % : 7.0 %  Auto Eosinophil % : 1.2 %  Auto Basophil % : 0.7 %        136  |  100  |  15  ----------------------------<  90  4.8   |  22  |  0.95    Ca    11.2<H>      08 May 2018 12:45    TPro  8.5<H>  /  Alb  3.8  /  TBili  0.4  /  DBili  x   /  AST  27  /  ALT  13  /  AlkPhos  101  05-08    PT/INR - ( 08 May 2018 12:45 )   PT: 12.1 sec;   INR: 1.11 ratio         PTT - ( 08 May 2018 12:45 )  PTT:29.8 sec
CHIEF COMPLAINT: Patient is a 63y old  Female who presents with a chief complaint of weakness (09 May 2018 00:33)      HPI:    63 y/o female who presents with significant h/o left lung cancer diagnosed in . Initially treated with chemo and RT followed by surgery, currently on chemo therapy last chemo was 2017. Then in 2015, diagnosed with metastatic cancer of right wrist. Treated with Gamma knife followed by RT. In 2015, diagnosed with metastatic cancer of the brain. Treated with surgery followed with gamma knife and immune therapy. Presents with right wrist tumor. Subsequent xray and PET scan confirmed pathology. s/p exploration resection lesion right wrist on . She recently was found to be signficnatly orthostatic and was started on Midodrine. Afterwards her near syncope has improved. She had a recent echo within last year with normal LV function.  More recently was admitted for SOB sec to  large pleural effusions and upper lobe infiltrates and possible element of chemo induced pneumonitis.. Effusion tapped and steroids started. She recently had a UTI and was treated with Abx.     She now presents for persistent weakness. States that she is having trouble standing and feeling like her legs are going to give out. SHe has been using a wheelchair recently. Denies any chest pain, dyspnea, palpitations, PND, orthopnea,  syncope, lower extremity edema, stroke like symptoms. She is at times still getting lightheaded standing. her nutritional intake is poor but has mildly improved      She has been complaining of more lower back/coccyx pain especially after laying down for prolonged periods of time.     EKG: not done    REVIEW OF SYSTEMS:   All other review of systems are negative unless indicated above    PAST MEDICAL & SURGICAL HISTORY:  Heart murmur: history F/u cardiac work up - asprin 81 mg for cardiac prophylaxis  Brain tumor: s/p brain surgery in   Malignant neoplasm of bone: Broken internal joint prothesis  Mass: right wrist mass in 2016  Depression  Lung cancer: diagnosed in  --  mets to bone, brain s/p craniotomy for brain met resection, lung wedge resection and right forearm met resection. also s/p xrt  Currently on chemo last chemo was 2017 will completeed end of   Hyponatremia  History of seizure: occurred before brain surgery -- last seizure 2015  Lung cancer, left: biopsy  chemo cisplatin/Alimta 2014  radiation   Anxiety  Hypothyroidism  Hyperlipidemia  Hypertension  S/P wrist surgery: x 2 in prior secondary to metastasis  Cancer: Swwjy-h-[id number] insertion  H/O brain surgery: 7/9/15 - removal of brain tumor--post op received gamma knife followed by RT  History of lung surgery: left wedge 1/15 --- diagnosed with mets to brain and right wrist  History of lung biopsy: left   2014 EBUS  positive lung cancer        SOCIAL HISTORY:  No tobacco, ethanol, or drug abuse.    FAMILY HISTORY:  Family history of heart disease (Mother): Mom  Family history of bone cancer (Father)    No family history of acute MI or sudden cardiac death.    MEDICATIONS  (STANDING):  aspirin enteric coated 81 milliGRAM(s) Oral daily  docusate sodium 100 milliGRAM(s) Oral daily  enoxaparin Injectable 40 milliGRAM(s) SubCutaneous daily  escitalopram 5 milliGRAM(s) Oral daily  levETIRAcetam 250 milliGRAM(s) Oral two times a day  levoFLOXacin  Tablet 250 milliGRAM(s) Oral every 24 hours  levothyroxine 125 MICROGram(s) Oral daily  midodrine 5 milliGRAM(s) Oral three times a day  sodium chloride 0.9%. 1000 milliLiter(s) (100 mL/Hr) IV Continuous <Continuous>    MEDICATIONS  (PRN):      Allergies    penicillin (Unknown)  Zosyn (Urticaria; Rash; Hives)    Intolerances        Home meds:  Home Medications:  aspirin 81 mg oral delayed release tablet: 1 tab(s) orally once a day (08 May 2018 15:08)  Benadryl 25 mg oral tablet: 2 tab(s) orally once a day (at bedtime) (08 May 2018 15:08)  Benefiber oral powder for reconstitution: orally once a day (08 May 2018 15:08)  Boost supplement: 1 each orally once a day (08 May 2018 15:08)  calcium carbonate: 1 tab(s) orally once a day (08 May 2018 15:08)  Compazine 10 mg oral tablet: 1 tab(s) orally every 6 hours, As Needed (08 May 2018 15:08)  docusate sodium 100 mg oral capsule: 1 cap(s) orally once a day (08 May 2018 15:08)  Dulcolax oral tablet: 2 tab(s) orally once a day (at bedtime) (08 May 2018 15:08)  Keppra 500 mg oral tablet: 0.5 tab(s) orally 2 times a day (08 May 2018 15:08)  Levaquin 500 mg oral tablet: 1 tab(s) orally every 24 hours     Note: this medication was to be completed on thurs 5/10/2018 (08 May 2018 15:08)  levothyroxine 125 mcg (0.125 mg) oral tablet: 1 tab(s) orally once a day (08 May 2018 15:08)  Lexapro 10 mg oral tablet: 0.5 tab(s) orally once a day (08 May 2018 15:08)  Marinol 5 mg oral capsule: 1 cap(s) orally once a day (08 May 2018 15:08)  midodrine 5 mg oral tablet: 1 tab(s) orally 3 times a day (08 May 2018 15:08)  One-A-Day Women oral tablet: 1 tab(s) orally once a day (08 May 2018 15:08)  simvastatin 10 mg oral tablet: 1 tab(s) orally once a day (08 May 2018 15:08)        VITAL SIGNS:   Vital Signs Last 24 Hrs  T(C): 36.7 (09 May 2018 04:31), Max: 36.7 (08 May 2018 18:36)  T(F): 98.1 (09 May 2018 04:31), Max: 98.1 (08 May 2018 20:55)  HR: 73 (09 May 2018 04:31) (70 - 99)  BP: 116/81 (09 May 2018 04:31) (98/68 - 148/89)  BP(mean): --  RR: 18 (09 May 2018 04:31) (16 - 18)  SpO2: 93% (09 May 2018 04:31) (93% - 100%)    I&O's Summary    08 May 2018 07:01  -  09 May 2018 07:00  --------------------------------------------------------  IN: 1320 mL / OUT: 600 mL / NET: 720 mL        On Exam:     Constitutional: NAD, cachetic  HEENT: Dry Mucous Membranes, Anicteric  Pulmonary: Decreased breath sounds b/l. No rales, crackles or wheeze appreciated.   Cardiovascular: Regular, S1 and S2, No murmurs, rubs, gallops or clicks  Gastrointestinal: Bowel Sounds present, soft, nontender.   Lymph: No peripheral edema. No lymphadenopathy.  Skin: No visible rashes or ulcers.  Psych:  Mood & affect appropriate    LABS: All Labs Reviewed:                        11.7   8.67  )-----------( 795      ( 09 May 2018 07:45 )             34.5                         14.1   10.4  )-----------( 913      ( 08 May 2018 12:45 )             42.5     09 May 2018 05:46    139    |  101    |  10     ----------------------------<  86     3.6     |  24     |  0.74   08 May 2018 12:45    136    |  100    |  15     ----------------------------<  90     4.8     |  22     |  0.95     Ca    9.7        09 May 2018 05:46  Ca    11.2       08 May 2018 12:45  Phos  2.8       09 May 2018 05:46  Mg     1.7       09 May 2018 05:46    TPro  6.8    /  Alb  3.2    /  TBili  0.2    /  DBili  x      /  AST  17     /  ALT  9      /  AlkPhos  77     09 May 2018 05:46  TPro  8.5    /  Alb  3.8    /  TBili  0.4    /  DBili  x      /  AST  27     /  ALT  13     /  AlkPhos  101    08 May 2018 12:45    PT/INR - ( 08 May 2018 12:45 )   PT: 12.1 sec;   INR: 1.11 ratio         PTT - ( 08 May 2018 12:45 )  PTT:29.8 sec  CARDIAC MARKERS ( 09 May 2018 05:46 )  x     / x     / 28 U/L / x     / x          Blood Culture:         RADIOLOGY:    < from: Xray Chest 1 View- PORTABLE-Urgent (18 @ 12:39) >    EXAM:  XR CHEST PORTABLE URGENT 1V                            PROCEDURE DATE:  2018            INTERPRETATION:  A single chest x-ray was obtained on May 8, 2018.    Indication: Fatigue.    Impression:     The heart is normal in size. Loss of volume left lung however no acute   consolidation could be identified. A MediPort is in place and the tip is   superior vena cava. No pneumothorax.                    WILLIAM GORDON M.D., ATTENDING RADIOLOGIST  This document has been electronically signed. May  8 2018  1:47PM                  < end of copied text >
Pager: [phone]  CHIEF COMPLAINT/ REASON FOR CONSULTATION:      HPI:  61 y/o female with pmh significant of lung cancer diagnosed in . Initially treated with chemo and RT followed by surgery, Then in 2015, diagnosed with metastatic cancer of right wrist. Treated with Gamma knife followed by RT. In 2015, diagnosed with metastatic cancer of the brain. Treated with surgery followed with gamma knife and immune therapy.  More recently admitted for SOB sec to a large pleural effusions and upper lobe infiltrates and possible chemo induced pneumonitis.. Effusion tapped and steroids started and pt DCd.     Since that last admit pt developed fever of 103 sec to UTI and pt was prescribed a prolonged course of ABx ( initially 7 days ) then due recurrence of sx  another course ( thus far 5 days of levaquin )  Pt now is being admitted for worsening LE weakness , difficulty ambulating. She denies back pain, radiculopathy, paresthesias. Reports that she has had progressive weakness over the past two weeks which she contributed to deconditioning. States that for the past 2 weeks she has needed to be trasnported in wheelchair, including for her MRI on  which showed post op changes and resolving edema.     no fever or chills   mild cough ( old and improving )   no CP/SOB /pal/ N/V/D   no urianry sx (08 May 2018 14:29)    PAST MEDICAL HISTORY   Heart murmur  Brain tumor  Malignant neoplasm of bone  Mass  Seizure  Depression  Snoring  Lung cancer  Hyponatremia  History of seizure  Bone cancer  Brain cancer  Lung cancer, left  Lung cancer, hilus, left  Anxiety  Hypothyroidism  Hyperlipidemia  Hypertension    PAST SURGICAL HISTORY   S/P wrist surgery  Cancer  H/O brain surgery  History of lung surgery  History of lung biopsy      penicillin (Unknown)  Zosyn (Urticaria; Rash; Hives)      MEDICATIONS:  Antibiotics:  levoFLOXacin  Tablet 250 milliGRAM(s) Oral every 24 hours    Neuro:  escitalopram 5 milliGRAM(s) Oral daily  levETIRAcetam 250 milliGRAM(s) Oral two times a day    Anticoagulation:  aspirin enteric coated 81 milliGRAM(s) Oral daily  enoxaparin Injectable 40 milliGRAM(s) SubCutaneous daily    Other:  docusate sodium 100 milliGRAM(s) Oral daily  levothyroxine 125 MICROGram(s) Oral daily  midodrine 5 milliGRAM(s) Oral three times a day  sodium chloride 0.9%. 1000 milliLiter(s) IV Continuous <Continuous>      FAMILY HISTORY:  Family history of hyperlipidemia  Family history of heart disease (Mother)  Family history of bone cancer (Father)      REVIEW OF SYSTEMS:  Check here if all are normal other than Neurological []  Negative except as above.     PHYSICAL EXAMINATION:   T(C): 36.7 (18 @ 18:36), Max: 36.7 (18 @ 18:36)  HR: 80 (18 @ 18:36) (70 - 99)  BP: 148/89 (18 @ 18:36) (98/68 - 148/89)  RR: 18 (18 @ 18:36) (16 - 18)  SpO2: 100% (18 @ 18:36) (96% - 100%)  Wt(kg): --  Weight (kg): 43.1 ( @ 10:48)  Exam:  Awake, Alert, AOX3  PERRL, EOMI, Face equal, Tongue m/l  5/5 throughout except RLE 4+/5, no drift  SILT      LABS:                        14.1   10.4  )-----------( 913      ( 08 May 2018 12:45 )             42.5         136  |  100  |  15  ----------------------------<  90  4.8   |  22  |  0.95    Ca    11.2<H>      08 May 2018 12:45    TPro  8.5<H>  /  Alb  3.8  /  TBili  0.4  /  DBili  x   /  AST  27  /  ALT  13  /  AlkPhos  101  -08    PT/INR - ( 08 May 2018 12:45 )   PT: 12.1 sec;   INR: 1.11 ratio         PTT - ( 08 May 2018 12:45 )  PTT:29.8 sec  Urinalysis Basic - ( 08 May 2018 12:45 )    Color: Yellow / Appearance: SL Turbid / S.017 / pH: x  Gluc: x / Ketone: Negative  / Bili: Negative / Urobili: Negative   Blood: x / Protein: 30 mg/dL / Nitrite: Negative   Leuk Esterase: Large / RBC: >50 /HPF / WBC >50 /HPF   Sq Epi: x / Non Sq Epi: x / Bacteria: Few /HPF        RADIOLOGY & ADDITIONAL STUDIES:    < from: MR Head w/wo IV Cont (18 @ 11:02) >  IMPRESSION:    No significant change in size or appearance of enhancement within the   left parietal lobe within the postoperative bed. Surrounding T2   hyperintensity has progressively decreased compatible with resolving   edema with resultant gliosis.    < end of copied text >

## 2018-05-10 VITALS — WEIGHT: 129.85 LBS

## 2018-05-10 LAB
24R-OH-CALCIDIOL SERPL-MCNC: 30.4 NG/ML — SIGNIFICANT CHANGE UP (ref 30–80)
CORTIS AM PEAK SERPL-MCNC: 13.5 UG/DL — SIGNIFICANT CHANGE UP (ref 6–18.4)
LEVETIRACETAM SERPL-MCNC: 8.1 MCG/ML — LOW (ref 12–46)
PHOSPHATE SERPL-MCNC: 3.4 MG/DL — SIGNIFICANT CHANGE UP (ref 2.5–4.5)
T3 SERPL-MCNC: 64 NG/DL — LOW (ref 80–200)
T4 FREE SERPL-MCNC: 1.1 NG/DL — SIGNIFICANT CHANGE UP (ref 0.9–1.8)

## 2018-05-10 PROCEDURE — 70450 CT HEAD/BRAIN W/O DYE: CPT | Mod: 26

## 2018-05-10 PROCEDURE — 99232 SBSQ HOSP IP/OBS MODERATE 35: CPT

## 2018-05-10 RX ORDER — CALCIUM CARBONATE 500(1250)
1 TABLET ORAL
Qty: 0 | Refills: 0 | COMMUNITY

## 2018-05-10 RX ORDER — LEVOTHYROXINE SODIUM 125 MCG
137 TABLET ORAL DAILY
Qty: 0 | Refills: 0 | Status: DISCONTINUED | OUTPATIENT
Start: 2018-05-10 | End: 2018-05-10

## 2018-05-10 RX ORDER — DIPHENHYDRAMINE HCL 50 MG
2 CAPSULE ORAL
Qty: 0 | Refills: 0 | COMMUNITY

## 2018-05-10 RX ORDER — LEVOTHYROXINE SODIUM 125 MCG
1 TABLET ORAL
Qty: 30 | Refills: 0 | OUTPATIENT
Start: 2018-05-10 | End: 2018-06-08

## 2018-05-10 RX ORDER — CIPROFLOXACIN LACTATE 400MG/40ML
1 VIAL (ML) INTRAVENOUS
Qty: 0 | Refills: 0 | COMMUNITY

## 2018-05-10 RX ORDER — SIMVASTATIN 20 MG/1
1 TABLET, FILM COATED ORAL
Qty: 0 | Refills: 0 | COMMUNITY

## 2018-05-10 RX ORDER — LEVOTHYROXINE SODIUM 125 MCG
1 TABLET ORAL
Qty: 0 | Refills: 0 | COMMUNITY

## 2018-05-10 RX ADMIN — LEVETIRACETAM 250 MILLIGRAM(S): 250 TABLET, FILM COATED ORAL at 06:39

## 2018-05-10 RX ADMIN — Medication 100 MILLIGRAM(S): at 12:06

## 2018-05-10 RX ADMIN — MIDODRINE HYDROCHLORIDE 5 MILLIGRAM(S): 2.5 TABLET ORAL at 13:39

## 2018-05-10 RX ADMIN — MIDODRINE HYDROCHLORIDE 5 MILLIGRAM(S): 2.5 TABLET ORAL at 06:39

## 2018-05-10 RX ADMIN — Medication 81 MILLIGRAM(S): at 12:06

## 2018-05-10 RX ADMIN — ENOXAPARIN SODIUM 40 MILLIGRAM(S): 100 INJECTION SUBCUTANEOUS at 12:06

## 2018-05-10 RX ADMIN — ESCITALOPRAM OXALATE 5 MILLIGRAM(S): 10 TABLET, FILM COATED ORAL at 12:06

## 2018-05-10 RX ADMIN — Medication 125 MICROGRAM(S): at 05:22

## 2018-05-10 NOTE — CHART NOTE - NSCHARTNOTEFT_GEN_A_CORE
CTH reviewed. Stable exam. No further neurosurgical intervention anticipated at this time. Please reconsult as needed.

## 2018-05-10 NOTE — PROGRESS NOTE ADULT - SUBJECTIVE AND OBJECTIVE BOX
[facility]    Edis Cook, Denny, Yoni, Sanaz, Deny, Lucia      [phone]    CHIEF COMPLAINT: Patient is a 63y old  Female who presents with a chief complaint of weakness (09 May 2018 09:57)      Follow Up: weakness, h/o orthostasis    Interim history: The patient reports being weary.  Denies chest discomfort and shortness of breath.  No abdominal pain.  No new neurologic symptoms.      MEDICATIONS  (STANDING):  aspirin enteric coated 81 milliGRAM(s) Oral daily  docusate sodium 100 milliGRAM(s) Oral daily  enoxaparin Injectable 40 milliGRAM(s) SubCutaneous daily  escitalopram 5 milliGRAM(s) Oral daily  levETIRAcetam 250 milliGRAM(s) Oral two times a day  levothyroxine 137 MICROGram(s) Oral daily  midodrine 5 milliGRAM(s) Oral three times a day  sodium chloride 0.9%. 1000 milliLiter(s) (100 mL/Hr) IV Continuous <Continuous>    MEDICATIONS  (PRN):      REVIEW OF SYSTEMS:  eye, ent, GI, , allergic, dermatologic, musculoskeletal and neurologic are negative except as described above    Vital Signs Last 24 Hrs  T(C): 36.4 (10 May 2018 04:22), Max: 36.8 (09 May 2018 12:40)  T(F): 97.5 (10 May 2018 04:22), Max: 98.2 (09 May 2018 12:40)  HR: 74 (10 May 2018 04:22) (74 - 80)  BP: 101/74 (10 May 2018 04:22) (101/74 - 150/87)  BP(mean): --  RR: 18 (10 May 2018 04:22) (18 - 18)  SpO2: 95% (10 May 2018 04:22) (91% - 96%)    I&O's Summary    09 May 2018 07:01  -  10 May 2018 07:00  --------------------------------------------------------  IN: 1340 mL / OUT: 850 mL / NET: 490 mL        Telemetry past 24h:    PHYSICAL EXAM:    Constitutional: thin, NAD   HEENT:  MMM, sclerae anicteric, conjunctivae clear, no oral cyanosis.  Pulmonary: Non-labored, breath sounds are clear bilaterally, No wheezing, rales or rhonchi  Cardiovascular: Regular, S1 and S2.  1-2/6 apical murmur.  No rubs, gallops or clicks  Gastrointestinal: Bowel Sounds present, soft, nontender.   Lymph: No peripheral edema.   Neurological: Alert, no focal deficits  Skin: No rashes.  Psych:  Mood & affect appropriate    LABS: All Labs Reviewed:                        11.7   8.67  )-----------( 795      ( 09 May 2018 07:45 )             34.5                         14.1   10.4  )-----------( 913      ( 08 May 2018 12:45 )             42.5     09 May 2018 05:46    139    |  101    |  10     ----------------------------<  86     3.6     |  24     |  0.74   08 May 2018 12:45    136    |  100    |  15     ----------------------------<  90     4.8     |  22     |  0.95     Ca    9.7        09 May 2018 05:46  Ca    11.2       08 May 2018 12:45  Phos  3.4       10 May 2018 05:25  Phos  2.8       09 May 2018 05:46  Mg     1.7       09 May 2018 05:46    TPro  6.8    /  Alb  3.2    /  TBili  0.2    /  DBili  x      /  AST  17     /  ALT  9      /  AlkPhos  77     09 May 2018 05:46  TPro  8.5    /  Alb  3.8    /  TBili  0.4    /  DBili  x      /  AST  27     /  ALT  13     /  AlkPhos  101    08 May 2018 12:45    PT/INR - ( 08 May 2018 12:45 )   PT: 12.1 sec;   INR: 1.11 ratio         PTT - ( 08 May 2018 12:45 )  PTT:29.8 sec  CARDIAC MARKERS ( 09 May 2018 05:46 )  x     / x     / 28 U/L / x     / x          Blood Culture: Organism --  Gram Stain Blood -- Gram Stain --  Specimen Source .Blood Blood-Peripheral  Culture-Blood --    Organism --  Gram Stain Blood -- Gram Stain --  Specimen Source .Urine Clean Catch (Midstream)  Culture-Blood --        05-09 @ 07:50  TSH: 50.63      RADIOLOGY:    EKG:    Echo:

## 2018-05-10 NOTE — PROGRESS NOTE ADULT - SUBJECTIVE AND OBJECTIVE BOX
Patient is a 63y old  Female who presents with a chief complaint of weakness (09 May 2018 09:57)                                                               INTERVAL HPI/OVERNIGHT EVENTS:    REVIEW OF SYSTEMS:     CONSTITUTIONAL: improving weakness   RESPIRATORY: No cough, wheezing,  No shortness of breath  CARDIOVASCULAR: No chest pain or palpitations  GASTROINTESTINAL: No abdominal pain  . No nausea, vomiting  GENITOURINARY: No dysuria, frequency or hematuria  NEUROLOGICAL: No numbness or weakness                                                                                                                                                                                                                                                                                  Medications:  MEDICATIONS  (STANDING):  aspirin enteric coated 81 milliGRAM(s) Oral daily  docusate sodium 100 milliGRAM(s) Oral daily  enoxaparin Injectable 40 milliGRAM(s) SubCutaneous daily  escitalopram 5 milliGRAM(s) Oral daily  levETIRAcetam 250 milliGRAM(s) Oral two times a day  levothyroxine 137 MICROGram(s) Oral daily  midodrine 5 milliGRAM(s) Oral three times a day  sodium chloride 0.9%. 1000 milliLiter(s) (100 mL/Hr) IV Continuous <Continuous>    MEDICATIONS  (PRN):       Allergies    penicillin (Unknown)  Zosyn (Urticaria; Rash; Hives)    Intolerances      Vital Signs Last 24 Hrs  T(C): 36.5 (10 May 2018 13:37), Max: 36.5 (10 May 2018 13:37)  T(F): 97.7 (10 May 2018 13:37), Max: 97.7 (10 May 2018 13:37)  HR: 78 (10 May 2018 13:37) (74 - 78)  BP: 109/79 (10 May 2018 13:37) (101/74 - 109/79)  BP(mean): --  RR: 18 (10 May 2018 13:37) (18 - 18)  SpO2: 95% (10 May 2018 13:37) (95% - 95%)  CAPILLARY BLOOD GLUCOSE          - @ 07:01  -  -10 @ 07:00  --------------------------------------------------------  IN: 1340 mL / OUT: 850 mL / NET: 490 mL      Physical Exam:    Daily Weight in k.9 (10 May 2018 14:01)  General: NAD   HEENT:  Nonicteric, PERRLA  CV:  RRR, S1S2   Lungs:  CTA B/L, no wheezes, rales, rhonchi  Abdomen:  Soft, non-tender, no distended, positive BS  Extremities:  2+ pulses, no c/c, no edema  Skin:  Warm and dry, no rashes  :  No carcamo  Neuro:  AAOx3, non-focal, grossly intact                                                                                                                                                                                                                                                                                                LABS:                               11.7   8.67  )-----------( 795      ( 09 May 2018 07:45 )             34.5                          139  |  101  |  10  ----------------------------<  86  3.6   |  24  |  0.74    Ca    9.7      09 May 2018 05:46  Phos  3.4     05-10  Mg     1.7         TPro  6.8  /  Alb  3.2<L>  /  TBili  0.2  /  DBili  x   /  AST  17  /  ALT  9<L>  /  AlkPhos  77

## 2018-05-10 NOTE — DIETITIAN INITIAL EVALUATION ADULT. - ENERGY NEEDS
Height: 66 inches, Weight: 95 pounds  BMI: 15 kg/m2 IBW: 130 pounds (+/-10%), %IBW: 73%  Pertinent Info: Per chart, 62 y/o female with NSCLC with mets to bones and brain admitted with UTI and FTT, discharge back home today. No edema, no pressure ulcers noted at this time.

## 2018-05-10 NOTE — CHART NOTE - NSCHARTNOTEFT_GEN_A_CORE
Upon Nutritional Assessment by the Registered Dietitian your patient was determined to meet criteria / has evidence of the following diagnosis/diagnoses:          [ ]  Mild Protein Calorie Malnutrition        [ ]  Moderate Protein Calorie Malnutrition        [x ] Severe Protein Calorie Malnutrition        [ ] Unspecified Protein Calorie Malnutrition        [ ] Underweight / BMI <19        [ ] Morbid Obesity / BMI > 40      Findings as based on:  [x ] Comprehensive nutrition assessment   [ ] Nutrition Focused Physical Exam  [x ] Other: 30lbs (24%) wt loss 2/2 poor po intakes x 4 years in setting of metastatic cancer      Nutrition Plan/Recommendations:  High calories high protein diet education provided        PROVIDER Section:     By signing this assessment you are acknowledging and agree with the diagnosis/diagnoses assigned by the Registered Dietitian    Comments:

## 2018-05-10 NOTE — PROGRESS NOTE ADULT - ASSESSMENT
1) Onc- Met. NSCLC. Further treatment as outpatient. Only recently started on Gemzar. Recent decline in CEA.  - Ct scans look relatively ok , without evidence of significant change  - Will plan to hold  off on treatment for at least 2-4 weeks to allow patient time to recover   2) Heme- Thrombocytosis- likely reactive  - on asa, enoxaparin   3)FTT- Possibly related to recent infxn. Unclear if recent trouble with hypotension playing a role.   - f/u with cv.  - f/u with PT. Need for rehab?  4) Endocrine following for thyroid mgmt.    No heme-onc objection to dc once medically ready. Will touch base with Dr. Melgar.
1) Onc- Met. NSCLC. Further treatment as outpatient. Only recently started on Gemzar. Recent decline in CEA.  - Will get CT scan of c/a/p  to evaluate disease state as we try and determine the source of the patients weakness.   2) Heme- Thrombocytosis- likely reactive  - on asa, enoxaparin   3)FTT- unclear source- r/o infx, disease spread- f/u cx.  Pt consult.  4) Neuro- increasing weakness . Also has had some recent difficulty with low back pain. Case d/w Dr. Clark who saw patient today and believes that patient is ok from neuro standpoint.
62 F w hx of met lung Ca on chemo with orthostasis, nl LV, chemo induced pneumonitis, UTI presents with weakness.   - Denies any syncope.   - Check orthostatics.   - Cont midodrine 5mg q8. May have to titrate up if bp dropping, but so far I don't think this is needed  - Encourage PO intake.   - No need for repeat cardiac imaging at this time.   - No signs of significant ischemia or volume overload. Can check routine EKG.   -  f/u with heme/onc  - Monitor and replete electrolytes. Keep K>4.0 and Mg>2.0.   - Further cardiac workup will depend on clinical course.   - All other workup per primary team. Will followup.
63 y/o female with pmh significant of lung cancer diagnosed in 2014. Initially treated with chemo and RT followed by surgery, Then in June 2015, diagnosed with metastatic cancer of right wrist. Treated with Gamma knife followed by RT. In July 2015, diagnosed with metastatic cancer of the brain. Treated with surgery followed with gamma knife and immune therapy.  More rectnlyu admitted for SOB sec to  large pleural effusions and upper lobe infiltrates and possible element of chemo induced pneumonitis.. Effusion tapped and steroids started.    pt was discharged with f/u with pul and onco..   Prednisone was tapered to off after her evaluation by Dr Lai..  repeat cxr showed no significant re accumulation of pleural effusion.  and since that last admit pt developed fever of 103 sec to UTI and pt was prescribed a prolongd course of ABx ( initially 7 days ) then due recurrence of sx  another course ( thus far 5 days of levaquin )  Pt now is being admitted for worsening LE weakness , difficulty anmbulating..   no urinary or stool incontincence .. no LBP except at " tail bone due to weight loss "   no numbness and her weakness is generalized though seems chai more prominnant in LE..  no fever or chills   mild cough ( old and improving )   no CP/SOB /pal/ N/V/D   no urianry sx     1- difficuloty ambulating : doubt sec to spinal cord  more likely sec to severe dconditioning in setting of UTI     check orthostatics , IVF   d/c abx   culturesnegative and  procalcitonin NL   d/yenifer zocor and f/u keppra level   PT eval    2- Cachexia : nutrition consult   3- Meatstic lung  cancer : plan for CT C/A/P    4- hx of hypotension on middrine..cont end monitor BP   5- hypothyroid : consider increasing synthroid   monitor .. endo conslt called
63 y/o female with pmh significant of lung cancer diagnosed in 2014. Initially treated with chemo and RT followed by surgery, Then in June 2015, diagnosed with metastatic cancer of right wrist. Treated with Gamma knife followed by RT. In July 2015, diagnosed with metastatic cancer of the brain. Treated with surgery followed with gamma knife and immune therapy.  More rectnlyu admitted for SOB sec to  large pleural effusions and upper lobe infiltrates and possible element of chemo induced pneumonitis.. Effusion tapped and steroids started.    pt was discharged with f/u with pul and onco..   Prednisone was tapered to off after her evaluation by Dr Lai..  repeat cxr showed no significant re accumulation of pleural effusion.  and since that last admit pt developed fever of 103 sec to UTI and pt was prescribed a prolongd course of ABx ( initially 7 days ) then due recurrence of sx  another course ( thus far 5 days of levaquin )  Pt now is being admitted for worsening LE weakness , difficulty anmbulating..   no urinary or stool incontincence .. no LBP except at " tail bone due to weight loss "   no numbness and her weakness is generalized though seems chai more prominnant in LE..  no fever or chills   mild cough ( old and improving )   no CP/SOB /pal/ N/V/D   no urianry sx     1- difficuloty ambulating : doubt sec to spinal cord  more likely sec to severe dconditioning in setting of recent UTI    orthostatics negative    d/c abx   culturesnegative and  procalcitonin NL   d/yenifer zocor   PT eval: home with home PT    2- Cachexia : nutrition consult   3- Meatstic lung  cancer : no significant change on CT:   CT C/A/P : < from: CT Abdomen and Pelvis w/wo IV Cont (05.09.18 @ 14:49) >  Enlarging necrotic mediastinal mass as described above.    Similar-appearing 2.0 x 1.6 cm right axillary lymph node, non-FDG avid on   prior PET/CT study.    Interval decrease in bilateral upper lobe airspace opacities.   Similar-appearing bilateral large pleural effusions.       4- hx of hypotension on middrine..cont end monitor BP   5- hypothyroid : cont  synthroid  per endo ... discussed possibilty of adrenal insuffiency however seems to be less likely with cortisol level .. f/u as o/p with endo    .needs TFT in 6-8 wks

## 2018-05-10 NOTE — PROGRESS NOTE ADULT - SUBJECTIVE AND OBJECTIVE BOX
AYAKA DENT  MRN-[id number]    Patient is a 63y old  Female who presents with a chief complaint of weakness (09 May 2018 09:57)      Review of System    Overall she feels better, though still with some weakness and dizziness    General:	Denies fatigue, fevers, chills, sweats, decreased appetite.    Skin/Breast: denies pruritis, rash  	  Ophthalmologic: no change in vision or blurring  	  HEENT	Denies dry mouth, oral sores, dysphagia,  change in hearing.    Respiratory and Thorax:  cough, sob, wheeze, hemoptysis  	  Cardiovascular:	no cp , palp, orthopnea    Gastrointestinal:	no n/v/d constipation    Genitourinary:	no dysuria of frequency, no hematuria, no flank pain    Musculoskeletal:	no bone or joint pain. no muscle aches.     Neurological:	+ weakness and dizziness    Psychiatric:	no depression, no anxiety, insomnia.     Hematology/Lymphatics:	no bleeding or bruising      Current Meds  MEDICATIONS  (STANDING):  aspirin enteric coated 81 milliGRAM(s) Oral daily  docusate sodium 100 milliGRAM(s) Oral daily  enoxaparin Injectable 40 milliGRAM(s) SubCutaneous daily  escitalopram 5 milliGRAM(s) Oral daily  levETIRAcetam 250 milliGRAM(s) Oral two times a day  levoFLOXacin  Tablet 250 milliGRAM(s) Oral every 24 hours  levothyroxine 125 MICROGram(s) Oral daily  midodrine 5 milliGRAM(s) Oral three times a day  sodium chloride 0.9%. 1000 milliLiter(s) (100 mL/Hr) IV Continuous <Continuous>    MEDICATIONS  (PRN):      Vitals  Vital Signs Last 24 Hrs  T(C): 36.4 (10 May 2018 04:22), Max: 36.8 (09 May 2018 12:40)  T(F): 97.5 (10 May 2018 04:22), Max: 98.2 (09 May 2018 12:40)  HR: 74 (10 May 2018 04:22) (68 - 80)  BP: 101/74 (10 May 2018 04:22) (101/74 - 150/87)  BP(mean): --  RR: 18 (10 May 2018 04:22) (18 - 18)  SpO2: 95% (10 May 2018 04:22) (91% - 96%)    Physical Exam      Constitutional: NAD    Eyes: PERRLA EOMI, anicteric sclera    Heent :No oral sores, no pharyngeal injection. moist mucosa.    Neck: supple, no jvd, no LAD    Respiratory: CTA b/l     Cardiovascular: s1s2, no m/g/r    Gastrointestinal: soft, nt, nd, + BS    Extremities: no c/c/e    Neurological:A&O x 3 moves all ext.    Skin: no rash on exposed skin    Lymph Nodes: no lymphadenopathy.              Lab  CBC Full  -  ( 09 May 2018 07:45 )  WBC Count : 8.67 K/uL  Hemoglobin : 11.7 g/dL  Hematocrit : 34.5 %  Platelet Count - Automated : 795 K/uL  Mean Cell Volume : 94.3 fl  Mean Cell Hemoglobin : 32.0 pg  Mean Cell Hemoglobin Concentration : 33.9 gm/dL  Auto Neutrophil # : 5.98 K/uL  Auto Lymphocyte # : 1.44 K/uL  Auto Monocyte # : 0.81 K/uL  Auto Eosinophil # : 0.20 K/uL  Auto Basophil # : 0.21 K/uL  Auto Neutrophil % : 69.1 %  Auto Lymphocyte % : 16.6 %  Auto Monocyte % : 9.3 %  Auto Eosinophil % : 2.3 %  Auto Basophil % : 2.4 %    05-09    139  |  101  |  10  ----------------------------<  86  3.6   |  24  |  0.74    Ca    9.7      09 May 2018 05:46  Phos  3.4     05-10  Mg     1.7     05-09    TPro  6.8  /  Alb  3.2<L>  /  TBili  0.2  /  DBili  x   /  AST  17  /  ALT  9<L>  /  AlkPhos  77  05-09    PT/INR - ( 08 May 2018 12:45 )   PT: 12.1 sec;   INR: 1.11 ratio         PTT - ( 08 May 2018 12:45 )  PTT:29.8 sec    Rad:    Assessment/Plan

## 2018-05-10 NOTE — DIETITIAN INITIAL EVALUATION ADULT. - NS AS NUTRI INTERV ED CONTENT
High calories high protein diet education provided. Discussed meal preparation methods to increase calories/protein, using nutritional supplement to make nutrient dense snacks between meals, not skipping meals and consuming small frequent meals for early satiety. Pt states she started taking Marinol 1 week PTA and will continue after discharge. Written diet education material with sample menu provided.

## 2018-05-10 NOTE — DIETITIAN INITIAL EVALUATION ADULT. - OTHER INFO
Nutrition consult received for unintended wt loss and poor appetite. Pt reports UBW used to be 125 pounds, has been losing wt 2/2 persistent poor po intakes, states appetite has never been the same since the cancer diagnosis 2014. Per previous RD note, pt wt was documented as 105.8 pounds (7/29/2016), current wt is 95 pounds. Pt remains with fair appetite, 75% po intakes noted per flowsheet. No GI distress, +BM yesterday. Pt partially missing upper and lower teeth, but denies chewing/swallowing difficulties. NKFA. PTA takes multivitamin and calcium supplement.

## 2018-05-10 NOTE — DIETITIAN INITIAL EVALUATION ADULT. - ORAL INTAKE PTA
Pt reports decreased appetite and early satiety since being diagnosed with cancer 2014. Usual breakfast - cereal; Lunch 1 can of Boost; Dinner - small bites of whatever  prepares usually pasta or sandwich/poor

## 2018-05-16 ENCOUNTER — NON-APPOINTMENT (OUTPATIENT)
Age: 63
End: 2018-05-16

## 2018-05-16 ENCOUNTER — APPOINTMENT (OUTPATIENT)
Dept: CARDIOLOGY | Facility: CLINIC | Age: 63
End: 2018-05-16
Payer: MEDICARE

## 2018-05-16 VITALS
DIASTOLIC BLOOD PRESSURE: 92 MMHG | SYSTOLIC BLOOD PRESSURE: 129 MMHG | BODY MASS INDEX: 15.07 KG/M2 | WEIGHT: 96 LBS | OXYGEN SATURATION: 93 % | HEART RATE: 92 BPM | HEIGHT: 67 IN | RESPIRATION RATE: 16 BRPM

## 2018-05-16 PROCEDURE — 93000 ELECTROCARDIOGRAM COMPLETE: CPT

## 2018-05-16 PROCEDURE — 99214 OFFICE O/P EST MOD 30 MIN: CPT

## 2018-05-23 PROCEDURE — 99285 EMERGENCY DEPT VISIT HI MDM: CPT | Mod: 25

## 2018-05-23 PROCEDURE — 71045 X-RAY EXAM CHEST 1 VIEW: CPT

## 2018-05-23 PROCEDURE — 82746 ASSAY OF FOLIC ACID SERUM: CPT

## 2018-05-23 PROCEDURE — 70450 CT HEAD/BRAIN W/O DYE: CPT

## 2018-05-23 PROCEDURE — 87086 URINE CULTURE/COLONY COUNT: CPT

## 2018-05-23 PROCEDURE — 84480 ASSAY TRIIODOTHYRONINE (T3): CPT

## 2018-05-23 PROCEDURE — 87633 RESP VIRUS 12-25 TARGETS: CPT

## 2018-05-23 PROCEDURE — 97162 PT EVAL MOD COMPLEX 30 MIN: CPT

## 2018-05-23 PROCEDURE — 83735 ASSAY OF MAGNESIUM: CPT

## 2018-05-23 PROCEDURE — 85027 COMPLETE CBC AUTOMATED: CPT

## 2018-05-23 PROCEDURE — 85610 PROTHROMBIN TIME: CPT

## 2018-05-23 PROCEDURE — 87581 M.PNEUMON DNA AMP PROBE: CPT

## 2018-05-23 PROCEDURE — 96374 THER/PROPH/DIAG INJ IV PUSH: CPT

## 2018-05-23 PROCEDURE — 82533 TOTAL CORTISOL: CPT

## 2018-05-23 PROCEDURE — 85730 THROMBOPLASTIN TIME PARTIAL: CPT

## 2018-05-23 PROCEDURE — 80053 COMPREHEN METABOLIC PANEL: CPT

## 2018-05-23 PROCEDURE — 87798 DETECT AGENT NOS DNA AMP: CPT

## 2018-05-23 PROCEDURE — 84134 ASSAY OF PREALBUMIN: CPT

## 2018-05-23 PROCEDURE — 82607 VITAMIN B-12: CPT

## 2018-05-23 PROCEDURE — 82550 ASSAY OF CK (CPK): CPT

## 2018-05-23 PROCEDURE — 87040 BLOOD CULTURE FOR BACTERIA: CPT

## 2018-05-23 PROCEDURE — 84439 ASSAY OF FREE THYROXINE: CPT

## 2018-05-23 PROCEDURE — 87486 CHLMYD PNEUM DNA AMP PROBE: CPT

## 2018-05-23 PROCEDURE — 81001 URINALYSIS AUTO W/SCOPE: CPT

## 2018-05-23 PROCEDURE — 82306 VITAMIN D 25 HYDROXY: CPT

## 2018-05-23 PROCEDURE — 84443 ASSAY THYROID STIM HORMONE: CPT

## 2018-05-23 PROCEDURE — 74178 CT ABD&PLV WO CNTR FLWD CNTR: CPT

## 2018-05-23 PROCEDURE — 80177 DRUG SCRN QUAN LEVETIRACETAM: CPT

## 2018-05-23 PROCEDURE — 84145 PROCALCITONIN (PCT): CPT

## 2018-05-23 PROCEDURE — 71270 CT THORAX DX C-/C+: CPT

## 2018-05-23 PROCEDURE — 84100 ASSAY OF PHOSPHORUS: CPT

## 2018-06-12 ENCOUNTER — APPOINTMENT (OUTPATIENT)
Dept: ORTHOPEDIC SURGERY | Facility: CLINIC | Age: 63
End: 2018-06-12
Payer: MEDICARE

## 2018-06-12 DIAGNOSIS — C34.90 MALIGNANT NEOPLASM OF UNSPECIFIED PART OF UNSPECIFIED BRONCHUS OR LUNG: ICD-10-CM

## 2018-06-12 PROCEDURE — 73100 X-RAY EXAM OF WRIST: CPT | Mod: RT

## 2018-06-12 PROCEDURE — 99213 OFFICE O/P EST LOW 20 MIN: CPT

## 2018-06-13 ENCOUNTER — APPOINTMENT (OUTPATIENT)
Dept: CARDIOLOGY | Facility: CLINIC | Age: 63
End: 2018-06-13

## 2018-07-19 PROBLEM — R01.1 CARDIAC MURMUR, UNSPECIFIED: Chronic | Status: ACTIVE | Noted: 2017-06-01

## 2018-07-19 PROBLEM — D49.6 NEOPLASM OF UNSPECIFIED BEHAVIOR OF BRAIN: Chronic | Status: ACTIVE | Noted: 2017-06-01

## 2018-07-19 PROBLEM — C41.9 MALIGNANT NEOPLASM OF BONE AND ARTICULAR CARTILAGE, UNSPECIFIED: Chronic | Status: ACTIVE | Noted: 2017-06-01

## 2018-07-21 ENCOUNTER — APPOINTMENT (OUTPATIENT)
Dept: NUCLEAR MEDICINE | Facility: CLINIC | Age: 63
End: 2018-07-21
Payer: MEDICARE

## 2018-07-21 ENCOUNTER — OUTPATIENT (OUTPATIENT)
Dept: OUTPATIENT SERVICES | Facility: HOSPITAL | Age: 63
LOS: 1 days | End: 2018-07-21
Payer: MEDICARE

## 2018-07-21 DIAGNOSIS — Z00.8 ENCOUNTER FOR OTHER GENERAL EXAMINATION: ICD-10-CM

## 2018-07-21 DIAGNOSIS — Z98.89 OTHER SPECIFIED POSTPROCEDURAL STATES: Chronic | ICD-10-CM

## 2018-07-21 DIAGNOSIS — C80.1 MALIGNANT (PRIMARY) NEOPLASM, UNSPECIFIED: Chronic | ICD-10-CM

## 2018-07-21 DIAGNOSIS — Z98.890 OTHER SPECIFIED POSTPROCEDURAL STATES: Chronic | ICD-10-CM

## 2018-07-21 PROCEDURE — 78816 PET IMAGE W/CT FULL BODY: CPT | Mod: 26,KX,PS

## 2018-07-21 PROCEDURE — 78816 PET IMAGE W/CT FULL BODY: CPT

## 2018-07-21 PROCEDURE — A9552: CPT

## 2018-07-23 ENCOUNTER — RESULT REVIEW (OUTPATIENT)
Age: 63
End: 2018-07-23

## 2018-07-23 ENCOUNTER — APPOINTMENT (OUTPATIENT)
Dept: ULTRASOUND IMAGING | Facility: IMAGING CENTER | Age: 63
End: 2018-07-23

## 2018-07-23 ENCOUNTER — OUTPATIENT (OUTPATIENT)
Dept: OUTPATIENT SERVICES | Facility: HOSPITAL | Age: 63
LOS: 1 days | End: 2018-07-23
Payer: MEDICARE

## 2018-07-23 DIAGNOSIS — Z98.89 OTHER SPECIFIED POSTPROCEDURAL STATES: Chronic | ICD-10-CM

## 2018-07-23 DIAGNOSIS — Z00.8 ENCOUNTER FOR OTHER GENERAL EXAMINATION: ICD-10-CM

## 2018-07-23 DIAGNOSIS — Z98.890 OTHER SPECIFIED POSTPROCEDURAL STATES: Chronic | ICD-10-CM

## 2018-07-23 DIAGNOSIS — C80.1 MALIGNANT (PRIMARY) NEOPLASM, UNSPECIFIED: Chronic | ICD-10-CM

## 2018-07-23 PROCEDURE — 71046 X-RAY EXAM CHEST 2 VIEWS: CPT

## 2018-07-23 PROCEDURE — 32555 ASPIRATE PLEURA W/ IMAGING: CPT | Mod: LT

## 2018-07-23 PROCEDURE — 71046 X-RAY EXAM CHEST 2 VIEWS: CPT | Mod: 26

## 2018-07-23 PROCEDURE — 88305 TISSUE EXAM BY PATHOLOGIST: CPT

## 2018-07-23 PROCEDURE — 88305 TISSUE EXAM BY PATHOLOGIST: CPT | Mod: 26

## 2018-07-23 PROCEDURE — 88112 CYTOPATH CELL ENHANCE TECH: CPT | Mod: 26

## 2018-07-23 PROCEDURE — 32555 ASPIRATE PLEURA W/ IMAGING: CPT

## 2018-07-23 PROCEDURE — 88112 CYTOPATH CELL ENHANCE TECH: CPT

## 2018-07-25 LAB — NON-GYNECOLOGICAL CYTOLOGY STUDY: SIGNIFICANT CHANGE UP

## 2018-08-02 ENCOUNTER — OUTPATIENT (OUTPATIENT)
Dept: OUTPATIENT SERVICES | Facility: HOSPITAL | Age: 63
LOS: 1 days | End: 2018-08-02
Payer: COMMERCIAL

## 2018-08-02 ENCOUNTER — TRANSCRIPTION ENCOUNTER (OUTPATIENT)
Age: 63
End: 2018-08-02

## 2018-08-02 VITALS
RESPIRATION RATE: 16 BRPM | SYSTOLIC BLOOD PRESSURE: 90 MMHG | HEIGHT: 66 IN | TEMPERATURE: 97 F | OXYGEN SATURATION: 96 % | DIASTOLIC BLOOD PRESSURE: 60 MMHG | WEIGHT: 85.98 LBS | HEART RATE: 91 BPM

## 2018-08-02 DIAGNOSIS — R01.1 CARDIAC MURMUR, UNSPECIFIED: ICD-10-CM

## 2018-08-02 DIAGNOSIS — C79.51 SECONDARY MALIGNANT NEOPLASM OF BONE: ICD-10-CM

## 2018-08-02 DIAGNOSIS — C80.1 MALIGNANT (PRIMARY) NEOPLASM, UNSPECIFIED: Chronic | ICD-10-CM

## 2018-08-02 DIAGNOSIS — Z98.890 OTHER SPECIFIED POSTPROCEDURAL STATES: Chronic | ICD-10-CM

## 2018-08-02 DIAGNOSIS — Z98.89 OTHER SPECIFIED POSTPROCEDURAL STATES: Chronic | ICD-10-CM

## 2018-08-02 DIAGNOSIS — J90 PLEURAL EFFUSION, NOT ELSEWHERE CLASSIFIED: Chronic | ICD-10-CM

## 2018-08-02 DIAGNOSIS — I95.9 HYPOTENSION, UNSPECIFIED: ICD-10-CM

## 2018-08-02 DIAGNOSIS — E03.9 HYPOTHYROIDISM, UNSPECIFIED: ICD-10-CM

## 2018-08-02 DIAGNOSIS — F32.9 MAJOR DEPRESSIVE DISORDER, SINGLE EPISODE, UNSPECIFIED: ICD-10-CM

## 2018-08-02 DIAGNOSIS — C41.9 MALIGNANT NEOPLASM OF BONE AND ARTICULAR CARTILAGE, UNSPECIFIED: ICD-10-CM

## 2018-08-02 LAB
BUN SERPL-MCNC: 15 MG/DL — SIGNIFICANT CHANGE UP (ref 7–23)
CALCIUM SERPL-MCNC: 9.8 MG/DL — SIGNIFICANT CHANGE UP (ref 8.4–10.5)
CHLORIDE SERPL-SCNC: 92 MMOL/L — LOW (ref 98–107)
CO2 SERPL-SCNC: 25 MMOL/L — SIGNIFICANT CHANGE UP (ref 22–31)
CREAT SERPL-MCNC: 0.72 MG/DL — SIGNIFICANT CHANGE UP (ref 0.5–1.3)
GLUCOSE SERPL-MCNC: 61 MG/DL — LOW (ref 70–99)
HCT VFR BLD CALC: 39.6 % — SIGNIFICANT CHANGE UP (ref 34.5–45)
HGB BLD-MCNC: 13.3 G/DL — SIGNIFICANT CHANGE UP (ref 11.5–15.5)
MCHC RBC-ENTMCNC: 30.7 PG — SIGNIFICANT CHANGE UP (ref 27–34)
MCHC RBC-ENTMCNC: 33.6 % — SIGNIFICANT CHANGE UP (ref 32–36)
MCV RBC AUTO: 91.5 FL — SIGNIFICANT CHANGE UP (ref 80–100)
NRBC # FLD: 0 — SIGNIFICANT CHANGE UP
PLATELET # BLD AUTO: 360 K/UL — SIGNIFICANT CHANGE UP (ref 150–400)
PMV BLD: 11.2 FL — SIGNIFICANT CHANGE UP (ref 7–13)
POTASSIUM SERPL-MCNC: 3.2 MMOL/L — LOW (ref 3.5–5.3)
POTASSIUM SERPL-SCNC: 3.2 MMOL/L — LOW (ref 3.5–5.3)
RBC # BLD: 4.33 M/UL — SIGNIFICANT CHANGE UP (ref 3.8–5.2)
RBC # FLD: 11.8 % — SIGNIFICANT CHANGE UP (ref 10.3–14.5)
SODIUM SERPL-SCNC: 133 MMOL/L — LOW (ref 135–145)
WBC # BLD: 7.56 K/UL — SIGNIFICANT CHANGE UP (ref 3.8–10.5)
WBC # FLD AUTO: 7.56 K/UL — SIGNIFICANT CHANGE UP (ref 3.8–10.5)

## 2018-08-02 PROCEDURE — 93010 ELECTROCARDIOGRAM REPORT: CPT

## 2018-08-02 RX ORDER — ESCITALOPRAM OXALATE 10 MG/1
0.5 TABLET, FILM COATED ORAL
Qty: 0 | Refills: 0 | COMMUNITY

## 2018-08-02 RX ORDER — DRONABINOL 2.5 MG
1 CAPSULE ORAL
Qty: 0 | Refills: 0 | COMMUNITY

## 2018-08-02 RX ORDER — MULTIVIT-MIN/FERROUS GLUCONATE 9 MG/15 ML
1 LIQUID (ML) ORAL
Qty: 0 | Refills: 0 | COMMUNITY

## 2018-08-02 RX ORDER — WHEAT DEXTRIN 3 G/4 G
0 POWDER IN PACKET (EA) ORAL
Qty: 0 | Refills: 0 | COMMUNITY

## 2018-08-02 RX ORDER — MIDODRINE HYDROCHLORIDE 2.5 MG/1
1 TABLET ORAL
Qty: 0 | Refills: 0 | COMMUNITY

## 2018-08-02 RX ORDER — PROCHLORPERAZINE MALEATE 5 MG
1 TABLET ORAL
Qty: 0 | Refills: 0 | COMMUNITY

## 2018-08-02 RX ORDER — LEVOTHYROXINE SODIUM 125 MCG
0 TABLET ORAL
Qty: 0 | Refills: 0 | COMMUNITY

## 2018-08-02 RX ORDER — LEVETIRACETAM 250 MG/1
0.5 TABLET, FILM COATED ORAL
Qty: 0 | Refills: 0 | COMMUNITY

## 2018-08-02 NOTE — H&P PST ADULT - PSH
Cancer  [id number] insertion  H/O brain surgery  7/9/15 - removal of brain tumor--post op received gamma knife followed by RT  History of lung biopsy  left   2014 EBUS  positive lung cancer  History of lung surgery  left wedge 1/15 --- diagnosed with mets to brain and right wrist  S/P wrist surgery  x 2 in prior secondary to metastasis     Cancer  [id number] insertion  H/O brain surgery  7/9/15 - removal of brain tumor--post op received gamma knife followed by RT  History of lung biopsy  left   2014 EBUS  positive lung cancer  History of lung surgery  left wedge 1/15 --- diagnosed with mets to brain and right wrist  Pleural effusion on left  Drainage of fluid 2 weeks ago at [facility]  S/P wrist surgery  x 2 in prior secondary to metastasis

## 2018-08-02 NOTE — H&P PST ADULT - NEGATIVE NEUROLOGICAL SYMPTOMS
no generalized seizures/no tremors/no syncope/no loss of consciousness/no hemiparesis/no vertigo/no loss of sensation/no confusion/no headache/no facial palsy/no transient paralysis/no paresthesias

## 2018-08-02 NOTE — H&P PST ADULT - NEGATIVE GENERAL GENITOURINARY SYMPTOMS
no hematuria/normal urinary frequency/no flank pain L/no renal colic/no incontinence/no flank pain R/no urinary hesitancy

## 2018-08-02 NOTE — H&P PST ADULT - PROBLEM SELECTOR PLAN 1
scheduled for cor biopsy of mass right wrist  pending labs, medical clearance & last cardiac note  Preop instructions given & explained, pt verbalized understanding

## 2018-08-02 NOTE — H&P PST ADULT - HISTORY OF PRESENT ILLNESS
This is a 61 y/o female who presents with significant h/o left lung cancer diagnosed in 2014. Initially treated with chemo and RT followed by surgery, currently on chemo therapy last chemo was 5/25/2017. Then in June 2015, diagnosed with metastatic cancer of right wrist. Treated with Gamma knife followed by RT. In July 2015, diagnosed with metastatic cancer of the brain. Treated with surgery followed with gamma knife and immune therapy. Presents with right wrist tumor. Subsequent xray and PET scan confirmed pathology. s/p exploration resection lesion right wrist on 2016.Pt complains of wrist swelling, pain, decreased movement on hand, increasing numbness s/p recent ER  visit and  F/U with Ortho. Pt had Exploration/removal of Hardware Right Distal radius With Internal Fixation on 6/2/2017. Now scheduled for Core Bx of mass of right wrist on 08/03/18 This is a 61 y/o female who presents with significant h/o left lung cancer diagnosed in 2014. Initially treated with chemo and RT followed by surgery, currently on chemo therapy last chemo was 5/25/2017. Then in June 2015, diagnosed with metastatic cancer of right wrist. Treated with Gamma knife followed by RT. In July 2015, diagnosed with metastatic cancer of the brain. Treated with surgery followed with gamma knife and immune therapy. Presents with right wrist tumor. Subsequent xray and PET scan confirmed pathology. s/p exploration resection lesion right wrist on 2016.Pt complains of wrist swelling, pain, decreased movement on hand, increasing numbness s/p recent ER  visit and  F/U with Ortho. Pt had Exploration/removal of Hardware Right Distal radius With Internal Fixation on 6/2/2017. Now scheduled for Core Bx of mass of right wrist on 08/03/18.  Pt has hx Pleural effusion & had fluid drained 2 weeks ago at [facility].

## 2018-08-02 NOTE — H&P PST ADULT - MUSCULOSKELETAL
details… detailed exam no joint swelling/no joint erythema/no calf tenderness/normal no calf tenderness/decreased ROM due to pain/diminished strength/normal/right wrist/no joint swelling/no joint erythema

## 2018-08-03 ENCOUNTER — APPOINTMENT (OUTPATIENT)
Dept: ORTHOPEDIC SURGERY | Facility: HOSPITAL | Age: 63
End: 2018-08-03

## 2018-08-03 ENCOUNTER — RX RENEWAL (OUTPATIENT)
Age: 63
End: 2018-08-03

## 2018-08-03 ENCOUNTER — APPOINTMENT (OUTPATIENT)
Dept: ORTHOPEDIC SURGERY | Facility: CLINIC | Age: 63
End: 2018-08-03
Payer: MEDICARE

## 2018-08-03 ENCOUNTER — OUTPATIENT (OUTPATIENT)
Dept: OUTPATIENT SERVICES | Facility: HOSPITAL | Age: 63
LOS: 1 days | Discharge: HOME | End: 2018-08-03
Payer: MEDICARE

## 2018-08-03 ENCOUNTER — RESULT REVIEW (OUTPATIENT)
Age: 63
End: 2018-08-03

## 2018-08-03 VITALS
OXYGEN SATURATION: 94 % | TEMPERATURE: 98 F | SYSTOLIC BLOOD PRESSURE: 86 MMHG | WEIGHT: 85.98 LBS | RESPIRATION RATE: 14 BRPM | HEIGHT: 66 IN | HEART RATE: 98 BPM | DIASTOLIC BLOOD PRESSURE: 66 MMHG

## 2018-08-03 VITALS
HEART RATE: 95 BPM | DIASTOLIC BLOOD PRESSURE: 68 MMHG | SYSTOLIC BLOOD PRESSURE: 86 MMHG | OXYGEN SATURATION: 96 % | RESPIRATION RATE: 14 BRPM

## 2018-08-03 DIAGNOSIS — Z98.890 OTHER SPECIFIED POSTPROCEDURAL STATES: Chronic | ICD-10-CM

## 2018-08-03 DIAGNOSIS — Z98.89 OTHER SPECIFIED POSTPROCEDURAL STATES: Chronic | ICD-10-CM

## 2018-08-03 DIAGNOSIS — C80.1 MALIGNANT (PRIMARY) NEOPLASM, UNSPECIFIED: Chronic | ICD-10-CM

## 2018-08-03 DIAGNOSIS — C79.51 SECONDARY MALIGNANT NEOPLASM OF BONE: ICD-10-CM

## 2018-08-03 DIAGNOSIS — J90 PLEURAL EFFUSION, NOT ELSEWHERE CLASSIFIED: Chronic | ICD-10-CM

## 2018-08-03 LAB
GAS PNL BLDV: 132 MMOL/L — LOW (ref 136–146)
GLUCOSE BLDV-MCNC: 87 — SIGNIFICANT CHANGE UP (ref 70–99)
HCT VFR BLDV CALC: 48.8 % — HIGH (ref 34.5–45)
POTASSIUM BLDV-SCNC: 3.9 MMOL/L — SIGNIFICANT CHANGE UP (ref 3.4–4.5)

## 2018-08-03 PROCEDURE — 88311 DECALCIFY TISSUE: CPT | Mod: 26

## 2018-08-03 PROCEDURE — 99213 OFFICE O/P EST LOW 20 MIN: CPT | Mod: 25

## 2018-08-03 PROCEDURE — 20245 BONE BIOPSY OPEN DEEP: CPT | Mod: RT

## 2018-08-03 PROCEDURE — 88305 TISSUE EXAM BY PATHOLOGIST: CPT | Mod: 26

## 2018-08-03 RX ORDER — PROCHLORPERAZINE MALEATE 5 MG
1 TABLET ORAL
Qty: 0 | Refills: 0 | COMMUNITY

## 2018-08-03 RX ORDER — ESCITALOPRAM OXALATE 10 MG/1
1 TABLET, FILM COATED ORAL
Qty: 0 | Refills: 0 | COMMUNITY

## 2018-08-03 RX ORDER — SODIUM CHLORIDE 9 MG/ML
1000 INJECTION, SOLUTION INTRAVENOUS
Qty: 0 | Refills: 0 | Status: DISCONTINUED | OUTPATIENT
Start: 2018-08-03 | End: 2018-08-03

## 2018-08-03 RX ORDER — SIMVASTATIN 20 MG/1
1 TABLET, FILM COATED ORAL
Qty: 0 | Refills: 0 | COMMUNITY

## 2018-08-03 RX ORDER — LEVETIRACETAM 500 MG/1
500 TABLET, FILM COATED ORAL TWICE DAILY
Qty: 60 | Refills: 3 | Status: DISCONTINUED | COMMUNITY
Start: 2017-09-28 | End: 2018-08-03

## 2018-08-03 RX ORDER — MEGESTROL ACETATE 40 MG/ML
20 SUSPENSION ORAL
Qty: 0 | Refills: 0 | COMMUNITY

## 2018-08-03 RX ORDER — ASPIRIN/CALCIUM CARB/MAGNESIUM 324 MG
1 TABLET ORAL
Qty: 0 | Refills: 0 | COMMUNITY

## 2018-08-03 RX ORDER — MIDODRINE HYDROCHLORIDE 2.5 MG/1
1 TABLET ORAL
Qty: 0 | Refills: 0 | COMMUNITY

## 2018-08-03 RX ORDER — LEVOTHYROXINE SODIUM 125 MCG
1 TABLET ORAL
Qty: 0 | Refills: 0 | COMMUNITY

## 2018-08-03 RX ORDER — LEVETIRACETAM 250 MG/1
1 TABLET, FILM COATED ORAL
Qty: 0 | Refills: 0 | COMMUNITY

## 2018-08-03 NOTE — ASU DISCHARGE PLAN (ADULT/PEDIATRIC). - INSTRUCTIONS
Call office for follow up appointment. Follow up in 10-14 days from surgery date. Keep first meal light. Nothing fried, spicy or greasy. Increase fluids.

## 2018-08-03 NOTE — ASU DISCHARGE PLAN (ADULT/PEDIATRIC). - NURSING INSTRUCTIONS
No creams, lotions, powders  or ointments to incision site. Keep dressing clean dry and intact. May remove sling as needed. Sling is for comfort.

## 2018-08-03 NOTE — ASU PATIENT PROFILE, ADULT - PSH
Cancer  [id number] insertion  H/O brain surgery  7/9/15 - removal of brain tumor--post op received gamma knife followed by RT  History of lung biopsy  left   2014 EBUS  positive lung cancer  History of lung surgery  left wedge 1/15 --- diagnosed with mets to brain and right wrist  Pleural effusion on left  Drainage of fluid 2 weeks ago at [facility]  S/P wrist surgery  x 2 in prior secondary to metastasis

## 2018-08-03 NOTE — ASU DISCHARGE PLAN (ADULT/PEDIATRIC). - NOTIFY
Fever greater than 101/Pain not relieved by Medications red hot irritated skin or pussy drainage from incision/Bleeding that does not stop/Persistent Nausea and Vomiting/Unable to Urinate/Fever greater than 101/Swelling that continues/Numbness, color, or temperature change to extremity/Pain not relieved by Medications/Inability to Tolerate Liquids or Foods

## 2018-08-03 NOTE — ASU DISCHARGE PLAN (ADULT/PEDIATRIC). - MEDICATION SUMMARY - MEDICATIONS TO TAKE
I will START or STAY ON the medications listed below when I get home from the hospital:    calcium  -- 600 milligram(s) by mouth once a day  -- Indication: For home med    medical marijuana oil sublingual  -- orally 2 times a day  -- Indication: For home med    Percocet 5/325 oral tablet  -- 1 tab(s) by mouth every 4 to 6 hours, As Needed -for severe pain MDD:6   -- Caution federal law prohibits the transfer of this drug to any person other  than the person for whom it was prescribed.  May cause drowsiness.  Alcohol may intensify this effect.  Use care when operating dangerous machinery.  This prescription cannot be refilled.  This product contains acetaminophen.  Do not use  with any other product containing acetaminophen to prevent possible liver damage.  Using more of this medication than prescribed may cause serious breathing problems.    -- Indication: For pain    aspirin 81 mg oral tablet  -- 1 tab(s) by mouth once a day LD 7/25/18  -- Indication: For home med    Keppra 250 mg oral tablet  -- 1 tab(s) by mouth 2 times a day  -- Indication: For home med    Lexapro 10 mg oral tablet  -- 1 tab(s) by mouth once a day  -- Indication: For home med    Compazine  -- 1 tab(s) by mouth , As Needed  -- Indication: For home med    simvastatin 10 mg oral tablet  -- 1 tab(s) by mouth once a day (at bedtime)  -- Indication: For home med    Megace  -- 20 milliliter(s) by mouth once a day  -- Indication: For home med    Mucinex 600 mg oral tablet, extended release  -- 1 tab(s) by mouth every 12 hours, As Needed  -- Indication: For home med    Ducodyl 5 mg oral delayed release tablet  -- 1 tab(s) by mouth once a day, As Needed  -- Indication: For home med    midodrine 10 mg oral tablet  -- 1 tab(s) by mouth 3 times a day  -- Indication: For home med    levothyroxine 137 mcg (0.137 mg) oral tablet  -- 1 tab(s) by mouth once a day  -- Indication: For home med    Multiple Vitamins oral tablet  -- 1 tab(s) by mouth once a day  -- Indication: For home med

## 2018-08-03 NOTE — ASU DISCHARGE PLAN (ADULT/PEDIATRIC). - PAIN
prescription called to pharmacy Narcotic pain medication may cause nausea or constipation. Take medication with food. Increase fluids and fiber intake./prescription called to pharmacy

## 2018-08-03 NOTE — BRIEF OPERATIVE NOTE - PROCEDURE
<<-----Click on this checkbox to enter Procedure Core biopsy of muscle  08/03/2018  right wrist  Active  [id number]

## 2018-08-03 NOTE — ASU DISCHARGE PLAN (ADULT/PEDIATRIC). - SPECIAL INSTRUCTIONS
Please call office for follow up appointment; Follow up in 10-14 days from surgery date; Please rest and ice affected wrist; Weight bearing as tolerated; keep dressing clean, dry, and intact.

## 2018-08-03 NOTE — ASU PREOP CHECKLIST - INTERNAL PROSTHESES
yes(specify)/clips chest right wriat metal and posterior head yes(specify)/clips chest right wrist metal and posterior head

## 2018-08-09 ENCOUNTER — APPOINTMENT (OUTPATIENT)
Dept: ORTHOPEDIC SURGERY | Facility: CLINIC | Age: 63
End: 2018-08-09

## 2018-08-10 LAB — SURGICAL PATHOLOGY STUDY: SIGNIFICANT CHANGE UP

## 2018-08-15 ENCOUNTER — APPOINTMENT (OUTPATIENT)
Dept: THORACIC SURGERY | Facility: CLINIC | Age: 63
End: 2018-08-15

## 2018-08-22 ENCOUNTER — APPOINTMENT (OUTPATIENT)
Dept: MRI IMAGING | Facility: IMAGING CENTER | Age: 63
End: 2018-08-22

## 2018-08-22 ENCOUNTER — APPOINTMENT (OUTPATIENT)
Dept: NEUROSURGERY | Facility: CLINIC | Age: 63
End: 2018-08-22

## 2018-09-06 NOTE — CONSULT NOTE ADULT - ASSESSMENT
1) Onc- Met. NSCLC. Further treatment as outpatient. Only recently started on Gemzar. Recent decline in CEA.  - Will get CT scan of c/a/p to evaluate disease state as we try and determine the source of the patients weakness. ( will order in AM)  2) Heme- Thrombocytosis- likely reactive  - on asa, enoxaparin   3)FTT- unclear source- r/o infx, disease spread- f/u cx.  Pt consult.  4) Neuro- increasing weakness . Also has had some recent difficulty with low back pain. Case d/w Dr. Clark who will see patient in AM. Patient for possible MRI of spine. [facility]net MRI was of brain only. Postop Diagnosis: same

## 2018-09-25 ENCOUNTER — FORM ENCOUNTER (OUTPATIENT)
Age: 63
End: 2018-09-25

## 2018-09-26 ENCOUNTER — APPOINTMENT (OUTPATIENT)
Dept: NEUROSURGERY | Facility: CLINIC | Age: 63
End: 2018-09-26
Payer: MEDICARE

## 2018-09-26 ENCOUNTER — APPOINTMENT (OUTPATIENT)
Dept: MRI IMAGING | Facility: CLINIC | Age: 63
End: 2018-09-26
Payer: MEDICARE

## 2018-09-26 ENCOUNTER — OUTPATIENT (OUTPATIENT)
Dept: OUTPATIENT SERVICES | Facility: HOSPITAL | Age: 63
LOS: 1 days | End: 2018-09-26
Payer: MEDICARE

## 2018-09-26 DIAGNOSIS — Z98.890 OTHER SPECIFIED POSTPROCEDURAL STATES: Chronic | ICD-10-CM

## 2018-09-26 DIAGNOSIS — J90 PLEURAL EFFUSION, NOT ELSEWHERE CLASSIFIED: Chronic | ICD-10-CM

## 2018-09-26 DIAGNOSIS — Z98.89 OTHER SPECIFIED POSTPROCEDURAL STATES: Chronic | ICD-10-CM

## 2018-09-26 DIAGNOSIS — Z00.8 ENCOUNTER FOR OTHER GENERAL EXAMINATION: ICD-10-CM

## 2018-09-26 DIAGNOSIS — C80.1 MALIGNANT (PRIMARY) NEOPLASM, UNSPECIFIED: Chronic | ICD-10-CM

## 2018-09-26 PROCEDURE — A9585: CPT

## 2018-09-26 PROCEDURE — 99213 OFFICE O/P EST LOW 20 MIN: CPT

## 2018-09-26 PROCEDURE — 70553 MRI BRAIN STEM W/O & W/DYE: CPT | Mod: 26

## 2018-09-26 PROCEDURE — 70553 MRI BRAIN STEM W/O & W/DYE: CPT

## 2018-09-26 PROCEDURE — 82565 ASSAY OF CREATININE: CPT

## 2018-10-23 ENCOUNTER — APPOINTMENT (OUTPATIENT)
Dept: ORTHOPEDIC SURGERY | Facility: CLINIC | Age: 63
End: 2018-10-23
Payer: MEDICARE

## 2018-10-23 PROCEDURE — 99213 OFFICE O/P EST LOW 20 MIN: CPT

## 2018-10-31 ENCOUNTER — APPOINTMENT (OUTPATIENT)
Dept: CARDIOLOGY | Facility: CLINIC | Age: 63
End: 2018-10-31
Payer: MEDICARE

## 2018-10-31 ENCOUNTER — NON-APPOINTMENT (OUTPATIENT)
Age: 63
End: 2018-10-31

## 2018-10-31 VITALS
OXYGEN SATURATION: 98 % | TEMPERATURE: 97.9 F | BODY MASS INDEX: 15.07 KG/M2 | HEART RATE: 103 BPM | HEIGHT: 67 IN | DIASTOLIC BLOOD PRESSURE: 68 MMHG | WEIGHT: 96 LBS | RESPIRATION RATE: 16 BRPM | SYSTOLIC BLOOD PRESSURE: 86 MMHG

## 2018-10-31 PROCEDURE — 99214 OFFICE O/P EST MOD 30 MIN: CPT

## 2018-10-31 PROCEDURE — 93000 ELECTROCARDIOGRAM COMPLETE: CPT

## 2018-11-13 ENCOUNTER — APPOINTMENT (OUTPATIENT)
Dept: NUCLEAR MEDICINE | Facility: CLINIC | Age: 63
End: 2018-11-13
Payer: MEDICARE

## 2018-11-13 ENCOUNTER — OUTPATIENT (OUTPATIENT)
Dept: OUTPATIENT SERVICES | Facility: HOSPITAL | Age: 63
LOS: 1 days | End: 2018-11-13
Payer: MEDICARE

## 2018-11-13 DIAGNOSIS — J90 PLEURAL EFFUSION, NOT ELSEWHERE CLASSIFIED: Chronic | ICD-10-CM

## 2018-11-13 DIAGNOSIS — Z98.89 OTHER SPECIFIED POSTPROCEDURAL STATES: Chronic | ICD-10-CM

## 2018-11-13 DIAGNOSIS — Z98.890 OTHER SPECIFIED POSTPROCEDURAL STATES: Chronic | ICD-10-CM

## 2018-11-13 DIAGNOSIS — Z00.8 ENCOUNTER FOR OTHER GENERAL EXAMINATION: ICD-10-CM

## 2018-11-13 DIAGNOSIS — C80.1 MALIGNANT (PRIMARY) NEOPLASM, UNSPECIFIED: Chronic | ICD-10-CM

## 2018-11-13 PROCEDURE — 78815 PET IMAGE W/CT SKULL-THIGH: CPT | Mod: 26,PS,KX

## 2018-11-13 PROCEDURE — A9552: CPT

## 2018-11-13 PROCEDURE — 78815 PET IMAGE W/CT SKULL-THIGH: CPT

## 2018-11-16 NOTE — ED PROVIDER NOTE - CARDIOVASCULAR NEGATIVE STATEMENT, MLM
Reason For Visit  Reason For Visit:   Patient presents for follow-up .   Chaperone: LILIAN ANN is accompanied by her mother.   :  services not used.        Progress Note  Progress Note:   Session Type: Individual   Date: 1/15/18   Start Time: 4:01, pm   End Time: 4:55, pm   Mood: Anxious and Happy.   Recent Behavior: No Problem Indicated.   Symptom Change: None.   Diagnoses: F41.1, F42   Mental Status: No Problem Indicated   Risk of Harm: Low   Suicide/Homicide: None   Self-Injury: None   Abuse (Phys,Sex,Emot): None   Type of Treatment: Cognitive Behavioral Therapy.   Session Summary: Lilian came to session by herself. She passed her math class with a C and is feeling good about that. Her anxiety level for this semester is way down. She feels like this semester will be much easier and she urrutia snot have math this semester. She has a literature class instead and likes this. We continued our work on boundaries. and focused on context. She realized that each of the relationships she has been in have different boundaries and that she needs more with certain people. She is being more realistic about her friends and her family and where to set these and we talked through this.   Plan: Continue with monthly counseling at this time.      Signatures   Electronically signed by : MARU GODINEZ LCSW; Jung 15 2018  5:06PM MADELINE (Author)    
no chest pain and no edema.

## 2018-11-20 ENCOUNTER — MOBILE ON CALL (OUTPATIENT)
Age: 63
End: 2018-11-20

## 2018-11-26 ENCOUNTER — MOBILE ON CALL (OUTPATIENT)
Age: 63
End: 2018-11-26

## 2019-01-16 ENCOUNTER — NON-APPOINTMENT (OUTPATIENT)
Age: 64
End: 2019-01-16

## 2019-01-16 ENCOUNTER — APPOINTMENT (OUTPATIENT)
Dept: CARDIOLOGY | Facility: CLINIC | Age: 64
End: 2019-01-16
Payer: MEDICARE

## 2019-01-16 VITALS
WEIGHT: 100 LBS | OXYGEN SATURATION: 98 % | SYSTOLIC BLOOD PRESSURE: 86 MMHG | HEART RATE: 11 BPM | RESPIRATION RATE: 16 BRPM | BODY MASS INDEX: 15.7 KG/M2 | DIASTOLIC BLOOD PRESSURE: 65 MMHG | HEIGHT: 67 IN | TEMPERATURE: 98.6 F

## 2019-01-16 DIAGNOSIS — I95.9 HYPOTENSION, UNSPECIFIED: ICD-10-CM

## 2019-01-16 PROCEDURE — 99214 OFFICE O/P EST MOD 30 MIN: CPT

## 2019-01-16 PROCEDURE — 93000 ELECTROCARDIOGRAM COMPLETE: CPT

## 2019-01-16 RX ORDER — MIDODRINE HYDROCHLORIDE 5 MG/1
5 TABLET ORAL
Qty: 270 | Refills: 3 | Status: ACTIVE | COMMUNITY
Start: 2018-01-12 | End: 1900-01-01

## 2019-01-16 NOTE — REVIEW OF SYSTEMS
[Feeling Fatigued] : feeling fatigued [see HPI] : see HPI [Shortness Of Breath] : no shortness of breath [Dyspnea on exertion] : dyspnea during exertion [Chest  Pressure] : no chest pressure [Chest Pain] : no chest pain [Lower Ext Edema] : no extremity edema [Leg Claudication] : no intermittent leg claudication [Palpitations] : no palpitations [Dizziness] : dizziness [Negative] : Heme/Lymph

## 2019-01-16 NOTE — DISCUSSION/SUMMARY
[FreeTextEntry1] : 63-year-old woman with a history as listed above who presents today for a followup cardiac evaluation.\par Ngoc has been fighting her metastatic cancer.  She is on her chemotherapy. She will continue draining her Pleurx per routine and followup with thoracic surgery. \par \par She is again hypotensive on exam. I think this will be a chronic issue. i would resume Midodrine .5mg q8 and increase her PO intake.   \par  \par She denies any anginal symptoms. Clinically she is euvolemic on exam. \par She will followup with her oncologist. She will continue with oral chemo as tolerated. \par She will followup with me in 3-4 months.

## 2019-01-16 NOTE — HISTORY OF PRESENT ILLNESS
[FreeTextEntry1] : 63-year-old woman with history of hypertension, hyperlipidemia, lung cancer s/p chemo/RT now s/p wedge resection, with mets to the brain s/p craniotomy, with  PSVT who presents for a followup visit.\par  She had a respiratory arrest secondary to a pulmonary edema in 8/2018. She then had a left Pleurex now that is drained about 1 time every six days. She is now on continuous home o2. \par \par She was last here in 10/2018.\par Since her last visit her left Pleurx was removed. Then required a right Pleurx catheter on the right side for worsening pleural effusion thought to be from her chemo and cancer. This was done about 2 weeks ago. She is draining every other days and draining about ~500cc. \par \par She is still being maintained on Tregreso. She completed radiation therapy to the wrist and chest about 2 weeks ago.\par  \par She is taking Midodrine 2.5mg q12. Her average BP is 90/60 .\par She is laying down for most of the day. She is trying stay more hydrated. She is eating more than previous. She denies any syncopal episodes. She at times will get lightheaded. \par \par she denies any chest pain, PND, orthopnea, lower extremity edema, palpitations,   syncope, stroke like symptoms.  \par \par

## 2019-01-16 NOTE — PHYSICAL EXAM
[Well Groomed] : well groomed [General Appearance - In No Acute Distress] : no acute distress [Eyelids - No Xanthelasma] : the eyelids demonstrated no xanthelasmas [Normal Oral Mucosa] : normal oral mucosa [No Oral Pallor] : no oral pallor [No Oral Cyanosis] : no oral cyanosis [Normal Jugular Venous A Waves Present] : normal jugular venous A waves present [Normal Jugular Venous V Waves Present] : normal jugular venous V waves present [No Jugular Venous Khanna A Waves] : no jugular venous khanna A waves [Respiration, Rhythm And Depth] : normal respiratory rhythm and effort [Exaggerated Use Of Accessory Muscles For Inspiration] : no accessory muscle use [Abdomen Soft] : soft [Abdomen Tenderness] : non-tender [Abdomen Mass (___ Cm)] : no abdominal mass palpated [Abnormal Walk] : normal gait [Nail Clubbing] : no clubbing of the fingernails [Cyanosis, Localized] : no localized cyanosis [Petechial Hemorrhages (___cm)] : no petechial hemorrhages [Skin Color & Pigmentation] : normal skin color and pigmentation [] : no rash [No Venous Stasis] : no venous stasis [Skin Lesions] : no skin lesions [No Skin Ulcers] : no skin ulcer [No Xanthoma] : no  xanthoma was observed [FreeTextEntry1] : right ant pleurx [Oriented To Time, Place, And Person] : oriented to person, place, and time [Affect] : the affect was normal [Mood] : the mood was normal [No Anxiety] : not feeling anxious [Rhythm Regular] : regular [Normal S1] : normal S1 [Normal S2] : normal S2 [II] : a grade 2 [1+] : left 1+ [Right Carotid Bruit] : no bruit heard over the right carotid [Left Carotid Bruit] : no bruit heard over the left carotid [No Pitting Edema] : no pitting edema present

## 2019-01-29 ENCOUNTER — FORM ENCOUNTER (OUTPATIENT)
Age: 64
End: 2019-01-29

## 2019-01-30 ENCOUNTER — APPOINTMENT (OUTPATIENT)
Dept: MRI IMAGING | Facility: IMAGING CENTER | Age: 64
End: 2019-01-30
Payer: MEDICARE

## 2019-01-30 ENCOUNTER — OUTPATIENT (OUTPATIENT)
Dept: OUTPATIENT SERVICES | Facility: HOSPITAL | Age: 64
LOS: 1 days | End: 2019-01-30
Payer: MEDICARE

## 2019-01-30 ENCOUNTER — APPOINTMENT (OUTPATIENT)
Dept: NEUROSURGERY | Facility: CLINIC | Age: 64
End: 2019-01-30
Payer: MEDICARE

## 2019-01-30 DIAGNOSIS — Z98.890 OTHER SPECIFIED POSTPROCEDURAL STATES: Chronic | ICD-10-CM

## 2019-01-30 DIAGNOSIS — Z98.89 OTHER SPECIFIED POSTPROCEDURAL STATES: Chronic | ICD-10-CM

## 2019-01-30 DIAGNOSIS — C80.1 MALIGNANT (PRIMARY) NEOPLASM, UNSPECIFIED: Chronic | ICD-10-CM

## 2019-01-30 DIAGNOSIS — Z00.8 ENCOUNTER FOR OTHER GENERAL EXAMINATION: ICD-10-CM

## 2019-01-30 DIAGNOSIS — J90 PLEURAL EFFUSION, NOT ELSEWHERE CLASSIFIED: Chronic | ICD-10-CM

## 2019-01-30 PROCEDURE — 70553 MRI BRAIN STEM W/O & W/DYE: CPT | Mod: 26

## 2019-01-30 PROCEDURE — 99213 OFFICE O/P EST LOW 20 MIN: CPT

## 2019-01-30 PROCEDURE — 70553 MRI BRAIN STEM W/O & W/DYE: CPT

## 2019-01-30 PROCEDURE — A9585: CPT

## 2019-02-01 NOTE — H&P PST ADULT - FALL HARM RISK TYPE OF ASSESSMENT
29 year old female presents here c/o midsternal cp sob and left arm numbness. Patient states she woke up from sleep went to use the restroom began having cp and sob. Patient states she also slept on her left side which she feels might be contributed to her left arm numbness which has now resolved. Patient states cp is resolving too. Patient states she has had these symptoms in the past when she was anxious and admits to being anxious now. Patient denies fever chills cough n/v abdominal pain, leg pain/swelling, recent travel, OCP use, tobacco use or recent surgeries. LMP 1/26/19 Admission

## 2019-02-26 ENCOUNTER — APPOINTMENT (OUTPATIENT)
Dept: ORTHOPEDIC SURGERY | Facility: CLINIC | Age: 64
End: 2019-02-26
Payer: MEDICARE

## 2019-02-26 DIAGNOSIS — C79.31 SECONDARY MALIGNANT NEOPLASM OF BRAIN: ICD-10-CM

## 2019-02-26 PROCEDURE — 99213 OFFICE O/P EST LOW 20 MIN: CPT

## 2019-02-26 NOTE — HISTORY OF PRESENT ILLNESS
[FreeTextEntry1] : Patient with a medical history of carcinoma of the lung with metastatic tumor to the right distal forearm previously patient had several surgical procedures followed by radiation therapy at this stage patient having  larg fungiforming metastatic mass dorsal aspect distal forearm.

## 2019-02-26 NOTE — PHYSICAL EXAM
[FreeTextEntry1] : Physical exam reveals a patient who is fully alert oriented a  having respiratory distress related to carcinoma of lung patient havingthoracocentesis. Examination of the right forearm demonstrates fungiforming mass over the dorsal aspect over the wrist distal forearm change of dressing. At this stage patient was recommended to be followed conservatively and to be seen as needed

## 2019-03-11 ENCOUNTER — OUTPATIENT (OUTPATIENT)
Dept: OUTPATIENT SERVICES | Facility: HOSPITAL | Age: 64
LOS: 1 days | End: 2019-03-11
Payer: MEDICARE

## 2019-03-11 ENCOUNTER — APPOINTMENT (OUTPATIENT)
Dept: NUCLEAR MEDICINE | Facility: IMAGING CENTER | Age: 64
End: 2019-03-11
Payer: MEDICARE

## 2019-03-11 ENCOUNTER — TRANSCRIPTION ENCOUNTER (OUTPATIENT)
Age: 64
End: 2019-03-11

## 2019-03-11 DIAGNOSIS — C80.1 MALIGNANT (PRIMARY) NEOPLASM, UNSPECIFIED: Chronic | ICD-10-CM

## 2019-03-11 DIAGNOSIS — Z98.89 OTHER SPECIFIED POSTPROCEDURAL STATES: Chronic | ICD-10-CM

## 2019-03-11 DIAGNOSIS — Z00.8 ENCOUNTER FOR OTHER GENERAL EXAMINATION: ICD-10-CM

## 2019-03-11 DIAGNOSIS — Z98.890 OTHER SPECIFIED POSTPROCEDURAL STATES: Chronic | ICD-10-CM

## 2019-03-11 DIAGNOSIS — J90 PLEURAL EFFUSION, NOT ELSEWHERE CLASSIFIED: Chronic | ICD-10-CM

## 2019-03-11 PROCEDURE — 78815 PET IMAGE W/CT SKULL-THIGH: CPT | Mod: 26,PS,KX

## 2019-03-11 PROCEDURE — 78815 PET IMAGE W/CT SKULL-THIGH: CPT

## 2019-03-11 PROCEDURE — A9552: CPT

## 2019-04-17 ENCOUNTER — APPOINTMENT (OUTPATIENT)
Dept: CARDIOLOGY | Facility: CLINIC | Age: 64
End: 2019-04-17

## 2019-05-21 ENCOUNTER — APPOINTMENT (OUTPATIENT)
Dept: ORTHOPEDIC SURGERY | Facility: CLINIC | Age: 64
End: 2019-05-21

## 2019-06-12 ENCOUNTER — APPOINTMENT (OUTPATIENT)
Dept: NEUROSURGERY | Facility: CLINIC | Age: 64
End: 2019-06-12

## 2019-06-12 ENCOUNTER — APPOINTMENT (OUTPATIENT)
Dept: MRI IMAGING | Facility: IMAGING CENTER | Age: 64
End: 2019-06-12

## 2020-01-09 NOTE — CONSULT NOTE ADULT - ASSESSMENT
Assessment  Hypothyroidism: 63y Female with hypothyroidism on synthroid 125 mcg daily, compliant with Synthroid intake, feeling week, has been on Chemo Tx, poor PO intake.  UTI: On medications, stable, monitored.  HTN  : Controlled, On med. <-- Click to add NO significant Past Surgical History

## 2020-10-16 NOTE — BRIEF OPERATIVE NOTE - PRE-OP
Pt would like a refill for the pending medication.      Last OV: 8/31  Next OV: 11/30    Pt contact: [phone]
Sudeep Walsh is calling to request a refill on the following medication(s):    Medication Request:  Requested Prescriptions     Pending Prescriptions Disp Refills    oxyCODONE-acetaminophen (PERCOCET) 5-325 MG per tablet 120 tablet 0     Sig: Take 1 tablet by mouth every 6 hours as needed for Pain for up to 30 days.        Last Visit Date (If Applicable):  6/39/6715    Next Visit Date:    Visit date not found        Last Refill:  9/16/2020
<<-----Click on this checkbox to enter Pre-Op Dx

## 2021-10-19 NOTE — H&P PST ADULT - OTHER CARE PROVIDERS
cardiologist, Dr. Raymundo Barclay  [phone] Cyclosporine Counseling:  I discussed with the patient the risks of cyclosporine including but not limited to hypertension, gingival hyperplasia,myelosuppression, immunosuppression, liver damage, kidney damage, neurotoxicity, lymphoma, and serious infections. The patient understands that monitoring is required including baseline blood pressure, CBC, CMP, lipid panel and uric acid, and then 1-2 times monthly CMP and blood pressure.

## 2022-04-15 NOTE — PHYSICAL THERAPY INITIAL EVALUATION ADULT - PERTINENT HX OF CURRENT PROBLEM, REHAB EVAL
62yo female history of metastatic lung cancer p/w generalized weakness. Recent diagnosis of UTI 2 weeks ago, now on second course of levaquin. Pt. reports increaseding generalized weakness for 2 weeks, and intermittent "dry heaving." Pt. with fevers when UTI's started, no longer with fevers. Denies cp, sob, cough, abdominal pain, diarrhea, headache, falls. Last chemo 3 weeks ago, unable to get chemo yesterday. no color change

## 2022-10-12 NOTE — H&P ADULT. - NS MD HP PULSE RADIAL
General Sunscreen Counseling: I recommended a broad spectrum sunscreen with a SPF of 30 or higher.  I explained that SPF 30 sunscreens block approximately 97 percent of the sun's harmful rays.  Sunscreens should be applied at least 15 minutes prior to expected sun exposure and then every 2 hours after that as long as sun exposure continues. If swimming or exercising sunscreen should be reapplied every 45 minutes to an hour after getting wet or sweating.  One ounce, or the equivalent of a shot glass full of sunscreen, is adequate to protect the skin not covered by a bathing suit. I also recommended a lip balm with a sunscreen as well. Sun protective clothing can be used in lieu of sunscreen but must be worn the entire time you are exposed to the sun's rays. Products Recommended: such as: neutrogena sheer zinc, cerave am, la roche posay toleriane uv Detail Level: Zone right normal/left normal

## 2023-03-21 NOTE — ED PROVIDER NOTE - CONTACT TIME
March 21, 2023         Patient: Elissa Phillips   YOB: 1961       To whom it may concern:     Due to patients diagnosis of cancer patient is unable to work at this time. Patient will be having several appointments for her condition. If you have any questions or concerns, please feel free to contact our office at [phone]. Thank you            Sincerely,         Tavo Sharma MD                                              
22-Feb-2018 15:37

## 2024-07-11 NOTE — PATIENT PROFILE ADULT. - SPIRITUAL CULTURAL, RELIGIOUS PRACTICES/VALUES, PROFILE
Per Dr. Dixon, surgery to be moved to the hospital due to change in procedure. Cancelled surgery on 8/12/24 at [facility]. Patient scheduled for open functional rhinoplasty, endoscopic septal perforation repair, bilateral inferior turbinate submucosal resection on 8/16/24 at [facility].   
[employer], would like to received communion

## 2024-09-26 NOTE — ASU PATIENT PROFILE, ADULT - NS PRO ABUSE SCREEN AFRAID ANYONE YN
-- DO NOT REPLY / DO NOT REPLY ALL --  -- This inbox is not monitored. If this was sent to the wrong provider or department, reroute message to P E[facility] Reroute pool. --  -- Message is from Engagement Center Operations (ECO) --    General Patient Message: Would like to speak with someone regarding her referral. The referral she was given to see an orthopedic does not accept her insurance    Caller Information       Contact Date/Time Type Contact Phone/Fax    09/26/2024 09:46 AM CDT Phone (Incoming) Isabel Muñoz (Self) [phone] (M)            Alternative phone number:     Can a detailed message be left? Yes - Voicemail   Patient has been advised the message will be addressed within 2-3 business days.                 no

## 2025-01-20 NOTE — ASU PATIENT PROFILE, ADULT - NSCAFFEINETYPE_GEN_ALL_CORE_SD
What Type Of Note Output Would You Prefer (Optional)?: Bullet Format Hpi Title: Evaluation of Skin Lesions tea